# Patient Record
Sex: MALE | Race: WHITE | Employment: OTHER | URBAN - METROPOLITAN AREA
[De-identification: names, ages, dates, MRNs, and addresses within clinical notes are randomized per-mention and may not be internally consistent; named-entity substitution may affect disease eponyms.]

---

## 2017-02-17 ENCOUNTER — HOSPITAL ENCOUNTER (OUTPATIENT)
Dept: INFUSION THERAPY | Age: 72
Discharge: HOME | End: 2017-02-17
Payer: MEDICARE

## 2017-02-17 VITALS
SYSTOLIC BLOOD PRESSURE: 148 MMHG | HEART RATE: 86 BPM | BODY MASS INDEX: 35.78 KG/M2 | WEIGHT: 270 LBS | DIASTOLIC BLOOD PRESSURE: 72 MMHG | RESPIRATION RATE: 18 BRPM | TEMPERATURE: 97.1 F | HEIGHT: 73 IN | OXYGEN SATURATION: 99 %

## 2017-02-17 PROCEDURE — 96366 THER/PROPH/DIAG IV INF ADDON: CPT

## 2017-02-17 PROCEDURE — 99201 HC NEW PT LEVEL I: CPT

## 2017-02-17 PROCEDURE — 96365 THER/PROPH/DIAG IV INF INIT: CPT

## 2017-02-17 PROCEDURE — 74011250636 HC RX REV CODE- 250/636: Performed by: PODIATRIST

## 2017-02-17 RX ORDER — GUAIFENESIN 100 MG/5ML
81 LIQUID (ML) ORAL DAILY
COMMUNITY

## 2017-02-17 RX ORDER — SODIUM CHLORIDE 0.9 % (FLUSH) 0.9 %
10-40 SYRINGE (ML) INJECTION AS NEEDED
Status: DISCONTINUED | OUTPATIENT
Start: 2017-02-17 | End: 2017-02-21 | Stop reason: HOSPADM

## 2017-02-17 RX ORDER — GUAIFENESIN 100 MG/5ML
50 SOLUTION ORAL
COMMUNITY
End: 2018-04-05

## 2017-02-17 RX ADMIN — ORITAVANCIN 1200 MG: 400 INJECTION, POWDER, LYOPHILIZED, FOR SOLUTION INTRAVENOUS at 09:54

## 2017-02-17 RX ADMIN — Medication 10 ML: at 08:40

## 2017-02-17 RX ADMIN — Medication 10 ML: at 13:10

## 2017-02-17 NOTE — PROGRESS NOTES
[facility] Progress Note    Date: 2017    Name: Shahab Cardozo    MRN: [id number]         : 1945    Mr. Maddison Albrecht was assessed and education was provided. Given [employer] information of Oritavancin and reviewed side effects with him. Mr. Jaida Ruiz vitals were reviewed. Visit Vitals    /72 (BP 1 Location: Left arm)    Pulse 86    Temp 97.1 °F (36.2 °C)    Resp 18    Ht 6' 1\" (1.854 m)    Wt 122.5 kg (270 lb)    SpO2 99%    BMI 35.62 kg/m2     Patient Vitals for the past 12 hrs:   Temp Pulse Resp BP SpO2   17 1319 97.1 °F (36.2 °C) 86 18 148/72 -   17 0826 98 °F (36.7 °C) 91 18 127/63 99 %     IV started in left forearm w/22 g INT w/o difficulty. Good blood return obtained, then flushed with 10 ml NS. [x]  oritavancin (Orbactiv) 1200 mg     []  Invanz     []  Cubicin     []  Rocephin    was infused at 333 ml/hr. No s/s reaction noted. IV flushed with 10 ml NS and removed. No irritation noted at site, 2x2 guaze and Coban applied. Patient was observed for 30 minutes after completion of infusion. No complaints offered, no s/s reaction noted. Mr. Maddison Albrecht tolerated infusion, and had no complaints at this time. Patient armband removed and shredded. Mr. Maddison Albrecht was discharged from [place] [address] in stable condition at 1325. He is to return on 2017 at 1100 for his next appointment for oritavancin infusion.     aRina Suresh RN  2017  4:31 PM

## 2017-02-27 ENCOUNTER — HOSPITAL ENCOUNTER (OUTPATIENT)
Dept: INFUSION THERAPY | Age: 72
Discharge: HOME | End: 2017-02-27
Payer: MEDICARE

## 2018-03-21 ENCOUNTER — HOSPITAL ENCOUNTER (INPATIENT)
Age: 73
LOS: 15 days | Discharge: NURSING, REHAB OR LONG-TERM CARE | DRG: 629 | End: 2018-04-05
Attending: EMERGENCY MEDICINE | Admitting: HOSPITALIST
Payer: MEDICARE

## 2018-03-21 DIAGNOSIS — L08.9 DIABETIC FOOT INFECTION (HCC): Primary | ICD-10-CM

## 2018-03-21 DIAGNOSIS — E11.628 DIABETIC FOOT INFECTION (HCC): Primary | ICD-10-CM

## 2018-03-21 DIAGNOSIS — L03.115 CELLULITIS OF RIGHT LOWER EXTREMITY: ICD-10-CM

## 2018-03-21 LAB
ALBUMIN SERPL-MCNC: 3.1 G/DL (ref 3.4–5)
ALBUMIN/GLOB SERPL: 0.7 {RATIO} (ref 0.8–1.7)
ALP SERPL-CCNC: 79 U/L (ref 45–117)
ALT SERPL-CCNC: 15 U/L (ref 16–61)
ANION GAP SERPL CALC-SCNC: 8 MMOL/L (ref 3–18)
AST SERPL-CCNC: 9 U/L (ref 15–37)
BASOPHILS # BLD: 0.1 K/UL (ref 0–0.06)
BASOPHILS NFR BLD: 1 % (ref 0–2)
BILIRUB SERPL-MCNC: 0.4 MG/DL (ref 0.2–1)
BUN SERPL-MCNC: 21 MG/DL (ref 7–18)
BUN/CREAT SERPL: 18 (ref 12–20)
CALCIUM SERPL-MCNC: 9.5 MG/DL (ref 8.5–10.1)
CHLORIDE SERPL-SCNC: 102 MMOL/L (ref 100–108)
CO2 SERPL-SCNC: 28 MMOL/L (ref 21–32)
CREAT SERPL-MCNC: 1.18 MG/DL (ref 0.6–1.3)
DIFFERENTIAL METHOD BLD: ABNORMAL
EOSINOPHIL # BLD: 0.2 K/UL (ref 0–0.4)
EOSINOPHIL NFR BLD: 3 % (ref 0–5)
ERYTHROCYTE [DISTWIDTH] IN BLOOD BY AUTOMATED COUNT: 13.2 % (ref 11.6–14.5)
GLOBULIN SER CALC-MCNC: 4.6 G/DL (ref 2–4)
GLUCOSE SERPL-MCNC: 399 MG/DL (ref 74–99)
HCT VFR BLD AUTO: 39.2 % (ref 36–48)
HGB BLD-MCNC: 13.1 G/DL (ref 13–16)
LACTATE SERPL-SCNC: 1.5 MMOL/L (ref 0.4–2)
LYMPHOCYTES # BLD: 1.3 K/UL (ref 0.9–3.6)
LYMPHOCYTES NFR BLD: 16 % (ref 21–52)
MCH RBC QN AUTO: 30.4 PG (ref 24–34)
MCHC RBC AUTO-ENTMCNC: 33.4 G/DL (ref 31–37)
MCV RBC AUTO: 91 FL (ref 74–97)
MONOCYTES # BLD: 0.5 K/UL (ref 0.05–1.2)
MONOCYTES NFR BLD: 6 % (ref 3–10)
NEUTS SEG # BLD: 6.3 K/UL (ref 1.8–8)
NEUTS SEG NFR BLD: 74 % (ref 40–73)
PLATELET # BLD AUTO: 246 K/UL (ref 135–420)
PMV BLD AUTO: 10.2 FL (ref 9.2–11.8)
POTASSIUM SERPL-SCNC: 4.6 MMOL/L (ref 3.5–5.5)
PROT SERPL-MCNC: 7.7 G/DL (ref 6.4–8.2)
RBC # BLD AUTO: 4.31 M/UL (ref 4.7–5.5)
SODIUM SERPL-SCNC: 138 MMOL/L (ref 136–145)
WBC # BLD AUTO: 8.4 K/UL (ref 4.6–13.2)

## 2018-03-21 PROCEDURE — 87040 BLOOD CULTURE FOR BACTERIA: CPT | Performed by: EMERGENCY MEDICINE

## 2018-03-21 PROCEDURE — 99282 EMERGENCY DEPT VISIT SF MDM: CPT

## 2018-03-21 PROCEDURE — 74011250636 HC RX REV CODE- 250/636: Performed by: EMERGENCY MEDICINE

## 2018-03-21 PROCEDURE — 83605 ASSAY OF LACTIC ACID: CPT | Performed by: EMERGENCY MEDICINE

## 2018-03-21 PROCEDURE — 65270000029 HC RM PRIVATE

## 2018-03-21 PROCEDURE — 80053 COMPREHEN METABOLIC PANEL: CPT | Performed by: EMERGENCY MEDICINE

## 2018-03-21 PROCEDURE — 85025 COMPLETE CBC W/AUTO DIFF WBC: CPT | Performed by: EMERGENCY MEDICINE

## 2018-03-21 RX ADMIN — SODIUM CHLORIDE 1000 MG: 900 INJECTION, SOLUTION INTRAVENOUS at 23:54

## 2018-03-21 RX ADMIN — TAZOBACTAM SODIUM AND PIPERACILLIN SODIUM 4.5 G: 500; 4 INJECTION, SOLUTION INTRAVENOUS at 23:05

## 2018-03-21 NOTE — IP AVS SNAPSHOT
[address] [place] [id number] 
[phone] Patient: Quynh Jo MRN: [id number] :1945 A check radha indicates which time of day the medication should be taken. My Medications START taking these medications Instructions Each Dose to Equal  
 Morning Noon Evening Bedtime  
 atorvastatin 40 mg tablet Commonly known as:  LIPITOR Your last dose was: Your next dose is: Take 1 Tab by mouth nightly. 40 mg  
    
   
   
   
  
 furosemide 40 mg tablet Commonly known as:  LASIX Your last dose was: Your next dose is: Take 1 Tab by mouth daily. 40 mg  
    
   
   
   
  
 insulin lispro 100 unit/mL injection Commonly known as:  HUMALOG Your last dose was: Your next dose is:    
   
   
 Normal Insulin Sensitivity  For Blood Sugar (mg/dL) of:    Less than 150 =   0 units          150 -199 =   2 units 200 -249 =   4 units 250 -299 =   6 units 300 -349 =   8 units 350 and above =   10 units If 2 glucose readings are above 200 mg/dL within a 24 hr period, proceed to \"Very Insul  
     
   
   
   
  
 isosorbide mononitrate ER 60 mg CR tablet Commonly known as:  IMDUR Start taking on:  2018 Your last dose was: Your next dose is: Take 1 Tab by mouth daily. 60 mg  
    
   
   
   
  
 metoprolol tartrate 25 mg tablet Commonly known as:  LOPRESSOR Your last dose was: Your next dose is: Take 1 Tab by mouth every twelve (12) hours. 25 mg  
    
   
   
   
  
 piperacillin-tazobactam 3.375 gram 3.375 g IVPB Your last dose was: Your next dose is:    
   
   
 3.375 g by IntraVENous route every six (6) hours for 29 days. 3.375 g  
    
   
   
   
  
 vancomycin in 0.9% Sodium Cl 1.75 gram/500 mL Soln Commonly known as:  Marinelayer Your last dose was: Your next dose is:    
   
   
 500 mL by IntraVENous route every twelve (12) hours every twelve (12) hours for 29 days. 1750 mg CONTINUE taking these medications Instructions Each Dose to Equal  
 Morning Noon Evening Bedtime  
 aspirin 81 mg chewable tablet Your last dose was: Your next dose is: Take 81 mg by mouth daily. 81 mg HumaLOG Mix 75-25(U-100)Insuln 100 unit/mL (75-25) injection Generic drug:  insulin lispro protamine/insulin lispro Your last dose was: Your next dose is:    
   
   
 50 Units by SubCUTAneous route two (2) times a day. 50 Units STOP taking these medications   
 guaiFENesin 100 mg/5 mL liquid Commonly known as:  ROBITUSSIN Where to Get Your Medications Information on where to get these meds will be given to you by the nurse or doctor. ! Ask your nurse or doctor about these medications  
  atorvastatin 40 mg tablet  
 furosemide 40 mg tablet  
 insulin lispro 100 unit/mL injection  
 isosorbide mononitrate ER 60 mg CR tablet  
 metoprolol tartrate 25 mg tablet  
 piperacillin-tazobactam 3.375 gram 3.375 g IVPB  
 vancomycin in 0.9% Sodium Cl 1.75 gram/500 mL Soln

## 2018-03-21 NOTE — IP AVS SNAPSHOT
[address] [address] 
[phone] Patient: Liana Fairchild MRN: [id number] :1945 About your hospitalization You were admitted on:  2018 You last received care in the:  [address] [place] [address] You were discharged on:  2018 Why you were hospitalized Your primary diagnosis was:  Diabetic Foot Infection (Hcc) Your diagnoses also included:  Htn (Hypertension), Diabetic Neuropathy (Hcc), Diabetic Foot Ulcer (Hcc), Diabetes (Hcc), Cad (Coronary Artery Disease), Cellulitis, Abscess Of Right Foot, Osteomyelitis Of Right Foot (Hcc) Follow-up Information Follow up With Details Comments Contact Info Allen Hernandez MD   [address] [address] [phone] Irene Keane MD On 2018 Follow up appointment scheduled for 2018 at 10:00 a.m. [address] [place] [address] 
[phone] [facility]  SNF is responsible for making arrangements for the PCP visit while in-house. Chosen to continue managing your healthcare needs [address] [place], [place] [address]y [address] [facility] 
[phone] [facility]  Chosen to continue managing your healthcare needs [facility] 
([facility]ooter) [phone] Kelsea Cheung MD  Please schedule an appointment for 1 week after discharge from SNF. [facility] [address] [place] [address] Peter Cohn 27 
[phone] Allen Hernandez MD   [address] [address] [phone] Discharge Orders Procedure Order Date Status Priority Quantity Spec Type Associated Dx CBC WITH AUTOMATED DIFF 18 1329 Future Routine 1 Blood Comments:  Please send results to Dr Kelsea Cheung, outpatient Infectious Disease MD. 
Phone: [phone] Fax: [phone] Schedule Instructions:  Please collect weekly, beginning April 9, 2018 while completing course of treatment. METABOLIC PANEL, COMPREHENSIVE 04/05/18 1329 Future Routine 1 Serum Comments:  Please send results to Dr Alina Dale, outpatient Infectious Disease MD. 
Phone: [phone] Fax: [phone] Schedule Instructions:  Please collect weekly beginning on April 9, 2018 while completing treatment. SED RATE (ESR) 04/05/18 1329 Future Routine 1 Whole Blood Comments:  Please send results to Dr Alina Dale, outpatient Infectious Disease MD. 
Phone: [phone] Fax: [phone] Schedule Instructions:  Please collect weekly beginning on April 9, 2018 while completing antibiotic treatment. C REACTIVE PROTEIN, QT 04/05/18 1329 Future Routine 1 Serum Comments:  Please send results to Dr Alina Dale, outpatient Infectious Disease MD. 
Phone: [phone] Fax: [phone] Schedule Instructions:  Please collect weekly beginning on April 9, 2018 while completing antibiotic course. VANCOMYCIN, TROUGH 04/05/18 1329 Future Routine 1 Blood Comments:  Please send results to Dr Alina Dale, outpatient Infectious Disease MD. 
Phone: [phone] Fax: [phone] Schedule Instructions:  Please collect weekly beginning on April 9, 2018 while completing antibiotic course. A check radha indicates which time of day the medication should be taken. My Medications START taking these medications Instructions Each Dose to Equal  
 Morning Noon Evening Bedtime  
 atorvastatin 40 mg tablet Commonly known as:  LIPITOR Your last dose was: Your next dose is: Take 1 Tab by mouth nightly. 40 mg  
    
   
   
   
  
 furosemide 40 mg tablet Commonly known as:  LASIX Your last dose was: Your next dose is: Take 1 Tab by mouth daily.   
 40 mg  
 insulin lispro 100 unit/mL injection Commonly known as:  HUMALOG Your last dose was: Your next dose is:    
   
   
 Normal Insulin Sensitivity  For Blood Sugar (mg/dL) of:    Less than 150 =   0 units          150 -199 =   2 units 200 -249 =   4 units 250 -299 =   6 units 300 -349 =   8 units 350 and above =   10 units If 2 glucose readings are above 200 mg/dL within a 24 hr period, proceed to \"Very Insul  
     
   
   
   
  
 isosorbide mononitrate ER 60 mg CR tablet Commonly known as:  IMDUR Start taking on:  4/6/2018 Your last dose was: Your next dose is: Take 1 Tab by mouth daily. 60 mg  
    
   
   
   
  
 metoprolol tartrate 25 mg tablet Commonly known as:  LOPRESSOR Your last dose was: Your next dose is: Take 1 Tab by mouth every twelve (12) hours. 25 mg  
    
   
   
   
  
 piperacillin-tazobactam 3.375 gram 3.375 g IVPB Your last dose was: Your next dose is:    
   
   
 3.375 g by IntraVENous route every six (6) hours for 29 days. 3.375 g  
    
   
   
   
  
 vancomycin in 0.9% Sodium Cl 1.75 gram/500 mL Soln Commonly known as:  [employer] Your last dose was: Your next dose is:    
   
   
 500 mL by IntraVENous route every twelve (12) hours every twelve (12) hours for 29 days. 1750 mg CONTINUE taking these medications Instructions Each Dose to Equal  
 Morning Noon Evening Bedtime  
 aspirin 81 mg chewable tablet Your last dose was: Your next dose is: Take 81 mg by mouth daily. 81 mg HumaLOG Mix 75-25(U-100)Insuln 100 unit/mL (75-25) injection Generic drug:  insulin lispro protamine/insulin lispro Your last dose was: Your next dose is:    
   
   
 50 Units by SubCUTAneous route two (2) times a day. 50 Units STOP taking these medications   
 guaiFENesin 100 mg/5 mL liquid Commonly known as:  ROBITUSSIN Where to Get Your Medications Information on where to get these meds will be given to you by the nurse or doctor. ! Ask your nurse or doctor about these medications  
  atorvastatin 40 mg tablet  
 furosemide 40 mg tablet  
 insulin lispro 100 unit/mL injection  
 isosorbide mononitrate ER 60 mg CR tablet  
 metoprolol tartrate 25 mg tablet  
 piperacillin-tazobactam 3.375 gram 3.375 g IVPB  
 vancomycin in 0.9% Sodium Cl 1.75 gram/500 mL Soln Discharge Instructions Learning About Coronary Artery Disease (CAD) What is coronary artery disease? Coronary artery disease (CAD) occurs when plaque builds up in the arteries that bring oxygen-rich blood to your heart. Plaque is a fatty substance made of cholesterol, calcium, and other substances in the blood. This process is called hardening of the arteries, or atherosclerosis. What happens when you have coronary artery disease? · Plaque may narrow the coronary arteries. Narrowed arteries cause poor blood flow. This can lead to angina symptoms such as chest pain or discomfort. If blood flow is completely blocked, you could have a heart attack. · You can slow CAD and reduce the risk of future problems by making changes in your lifestyle. These include quitting smoking and eating heart-healthy foods. · Treatments for CAD, along with changes in your lifestyle, can help you live a longer and healthier life. How can you prevent coronary artery disease? · Do not smoke. It may be the best thing you can do to prevent heart disease. If you need help quitting, talk to your doctor about stop-smoking programs and medicines. These can increase your chances of quitting for good. · Be active. Get at least 30 minutes of exercise on most days of the week. Walking is a good choice.  You also may want to do other activities, such as running, swimming, cycling, or playing tennis or team sports. · Eat heart-healthy foods. Eat more fruits and vegetables and less foods that contain saturated and trans fats. Limit alcohol, sodium, and sweets. · Stay at a healthy weight. Lose weight if you need to. · Manage other health problems such as diabetes, high blood pressure, and high cholesterol. · Manage stress. Stress can hurt your heart. To keep stress low, talk about your problems and feelings. Don't keep your feelings hidden. · If you have talked about it with your doctor, take a low-dose aspirin every day. Aspirin can help certain people lower their risk of a heart attack or stroke. But taking aspirin isn't right for everyone, because it can cause serious bleeding. Do not start taking daily aspirin unless your doctor knows about it. How is coronary artery disease treated? · Your doctor will suggest that you make lifestyle changes. For example, your doctor may ask you to eat healthy foods, quit smoking, lose extra weight, and be more active. · You will have to take medicines. · Your doctor may suggest a procedure to open narrowed or blocked arteries. This is called angioplasty. Or your doctor may suggest using healthy blood vessels to create detours around narrowed or blocked arteries. This is called bypass surgery. Follow-up care is a key part of your treatment and safety. Be sure to make and go to all appointments, and call your doctor if you are having problems. It's also a good idea to know your test results and keep a list of the medicines you take. Where can you learn more? Go to http://zuri-jesus.info/. Enter [phone] in the search box to learn more about \"Learning About Coronary Artery Disease (CAD). \" Current as of: September 21, 2016 Content Version: 11.4 © 8204-0479 [employer].  Care instructions adapted under license by [employer] (which disclaims liability or warranty for this information). If you have questions about a medical condition or this instruction, always ask your healthcare professional. [address] any warranty or liability for your use of this information. Skin Abscess: Care Instructions Your Care Instructions A skin abscess is a bacterial infection that forms a pocket of pus. A boil is a kind of skin abscess. The doctor may have cut an opening in the abscess so that the pus can drain out. You may have gauze in the cut so that the abscess will stay open and keep draining. You may need antibiotics. You will need to follow up with your doctor to make sure the infection has gone away. The doctor has checked you carefully, but problems can develop later. If you notice any problems or new symptoms, get medical treatment right away. Follow-up care is a key part of your treatment and safety. Be sure to make and go to all appointments, and call your doctor if you are having problems. It's also a good idea to know your test results and keep a list of the medicines you take. How can you care for yourself at home? · Apply warm and dry compresses, a heating pad set on low, or a hot water bottle 3 or 4 times a day for pain. Keep a cloth between the heat source and your skin. · If your doctor prescribed antibiotics, take them as directed. Do not stop taking them just because you feel better. You need to take the full course of antibiotics. · Take pain medicines exactly as directed. ¨ If the doctor gave you a prescription medicine for pain, take it as prescribed. ¨ If you are not taking a prescription pain medicine, ask your doctor if you can take an over-the-counter medicine. · Keep your bandage clean and dry. Change the bandage whenever it gets wet or dirty, or at least one time a day. · If the abscess was packed with gauze: 
¨ Keep follow-up appointments to have the gauze changed or removed.  If the doctor instructed you to remove the gauze, gently pull out all of the gauze when your doctor tells you to. ¨ After the gauze is removed, soak the area in warm water for 15 to 20 minutes 2 times a day, until the wound closes. When should you call for help? Call your doctor now or seek immediate medical care if: 
? · You have signs of worsening infection, such as: 
¨ Increased pain, swelling, warmth, or redness. ¨ Red streaks leading from the infected skin. ¨ Pus draining from the wound. ¨ A fever. ? Watch closely for changes in your health, and be sure to contact your doctor if: 
? · You do not get better as expected. Where can you learn more? Go to http://zuri-jesus.info/. Enter F103 in the search box to learn more about \"Skin Abscess: Care Instructions. \" Current as of: October 13, 2016 Content Version: 11.4 © 8085-4582 [employer]. Care instructions adapted under license by [email or web address] (which disclaims liability or warranty for this information). If you have questions about a medical condition or this instruction, always ask your healthcare professional. [place] [address] any warranty or liability for your use of this information. Cellulitis: Care Instructions Your Care Instructions Cellulitis is a skin infection. It often occurs after a break in the skin from a scrape, cut, bite, or puncture, or after a rash. The doctor has checked you carefully, but problems can develop later. If you notice any problems or new symptoms, get medical treatment right away. Follow-up care is a key part of your treatment and safety. Be sure to make and go to all appointments, and call your doctor if you are having problems. It's also a good idea to know your test results and keep a list of the medicines you take. How can you care for yourself at home? · Take your antibiotics as directed.  Do not stop taking them just because you feel better. You need to take the full course of antibiotics. · Prop up the infected area on pillows to reduce pain and swelling. Try to keep the area above the level of your heart as often as you can. · If your doctor told you how to care for your wound, follow your doctor's instructions. If you did not get instructions, follow this general advice: ¨ Wash the wound with clean water 2 times a day. Don't use hydrogen peroxide or alcohol, which can slow healing. ¨ You may cover the wound with a thin layer of petroleum jelly, such as Vaseline, and a nonstick bandage. ¨ Apply more petroleum jelly and replace the bandage as needed. · Be safe with medicines. Take pain medicines exactly as directed. ¨ If the doctor gave you a prescription medicine for pain, take it as prescribed. ¨ If you are not taking a prescription pain medicine, ask your doctor if you can take an over-the-counter medicine. To prevent cellulitis in the future · Try to prevent cuts, scrapes, or other injuries to your skin. Cellulitis most often occurs where there is a break in the skin. · If you get a scrape, cut, mild burn, or bite, wash the wound with clean water as soon as you can to help avoid infection. Don't use hydrogen peroxide or alcohol, which can slow healing. · If you have swelling in your legs (edema), support stockings and good skin care may help prevent leg sores and cellulitis. · Take care of your feet, especially if you have diabetes or other conditions that increase the risk of infection. Wear shoes and socks. Do not go barefoot. If you have athlete's foot or other skin problems on your feet, talk to your doctor about how to treat them. When should you call for help? Call your doctor now or seek immediate medical care if: 
? · You have signs that your infection is getting worse, such as: 
¨ Increased pain, swelling, warmth, or redness. ¨ Red streaks leading from the area. ¨ Pus draining from the area. ¨ A fever. ? · You get a rash. ? Watch closely for changes in your health, and be sure to contact your doctor if: 
? · You are not getting better after 1 day (24 hours). ? · You do not get better as expected. Where can you learn more? Go to http://zuri-jesus.info/. Devyn Medellin in the search box to learn more about \"Cellulitis: Care Instructions. \" Current as of: October 13, 2016 Content Version: 11.4 © 9819-4638 [employer]. Care instructions adapted under license by [employer] (which disclaims liability or warranty for this information). If you have questions about a medical condition or this instruction, always ask your healthcare professional. [address] any warranty or liability for your use of this information. Diabetes Foot Health: Care Instructions Your Care Instructions When you have diabetes, your feet need extra care and attention. Diabetes can damage the nerve endings and blood vessels in your feet, making you less likely to notice when your feet are injured. Diabetes also limits your body's ability to fight infection and get blood to areas that need it. If you get a minor foot injury, it could become an ulcer or a serious infection. With good foot care, you can prevent most of these problems. Caring for your feet can be quick and easy. Most of the care can be done when you are bathing or getting ready for bed. Follow-up care is a key part of your treatment and safety. Be sure to make and go to all appointments, and call your doctor if you are having problems. It's also a good idea to know your test results and keep a list of the medicines you take. How can you care for yourself at home? · Keep your blood sugar close to normal by watching what and how much you eat, monitoring blood sugar, taking medicines if prescribed, and getting regular exercise. · Do not smoke.  Smoking affects blood flow and can make foot problems worse. If you need help quitting, talk to your doctor about stop-smoking programs and medicines. These can increase your chances of quitting for good. · Eat a diet that is low in fats. High fat intake can cause fat to build up in your blood vessels and decrease blood flow. · Inspect your feet daily for blisters, cuts, cracks, or sores. If you cannot see well, use a mirror or have someone help you. · Take care of your feet: 
[employer] your feet every day. Use warm (not hot) water. Check the water temperature with your wrists or other part of your body, not your feet. ¨ Dry your feet well. Pat them dry. Do not rub the skin on your feet too hard. Dry well between your toes. If the skin on your feet stays moist, bacteria or a fungus can grow, which can lead to infection. ¨ Keep your skin soft. Use moisturizing skin cream to keep the skin on your feet soft and prevent calluses and cracks. But do not put the cream between your toes, and stop using any cream that causes a rash. ¨ Clean underneath your toenails carefully. Do not use a sharp object to clean underneath your toenails. Use the blunt end of a nail file or other rounded tool. ¨ Trim and file your toenails straight across to prevent ingrown toenails. Use a nail clipper, not scissors. Use an emery board to smooth the edges. · Change socks daily. Socks without seams are best, because seams often rub the feet. You can find socks for people with diabetes from specialty catalogs. · Look inside your shoes every day for things like gravel or torn linings, which could cause blisters or sores. · Buy shoes that fit well: 
¨ Look for shoes that have plenty of space around the toes. This helps prevent bunions and blisters. ¨ Try on shoes while wearing the kind of socks you will usually wear with the shoes. ¨ Avoid plastic shoes. They may rub your feet and cause blisters.  Good shoes should be made of materials that are flexible and breathable, such as leather or cloth. ¨ Break in new shoes slowly by wearing them for no more than an hour a day for several days. Take extra time to check your feet for red areas, blisters, or other problems after you wear new shoes. · Do not go barefoot. Do not wear sandals, and do not wear shoes with very thin soles. Thin soles are easy to puncture. They also do not protect your feet from hot pavement or cold weather. · Have your doctor check your feet during each visit. If you have a foot problem, see your doctor. Do not try to treat an early foot problem at home. Home remedies or treatments that you can buy without a prescription (such as corn removers) can be harmful. · Always get early treatment for foot problems. A minor irritation can lead to a major problem if not properly cared for early. When should you call for help? Call your doctor now or seek immediate medical care if: 
? · You have a foot sore, an ulcer or break in the skin that is not healing after 4 days, bleeding corns or calluses, or an ingrown toenail. ? · You have blue or black areas, which can mean bruising or blood flow problems. ? · You have peeling skin or tiny blisters between your toes or cracking or oozing of the skin. ? · You have a fever for more than 24 hours and a foot sore. ? · You have new numbness or tingling in your feet that does not go away after you move your feet or change positions. ? · You have unexplained or unusual swelling of the foot or ankle. ? Watch closely for changes in your health, and be sure to contact your doctor if: 
? · You cannot do proper foot care. Where can you learn more? Go to http://zuri-jesus.info/. Enter A739 in the search box to learn more about \"Diabetes Foot Health: Care Instructions. \" Current as of: March 13, 2017 Content Version: 11.4 © 6203-8521 [employer]wise[employer].  Care instructions adapted under license by N[employer]el [employer] (which disclaims liability or warranty for this information). If you have questions about a medical condition or this instruction, always ask your healthcare professional. [address]  any warranty or liability for your use of this information. HEART CATHETERIZATION/ANGIOGRAPHY DISCHARGE INSTRUCTIONS 1. Check puncture site frequently for swelling or bleeding. If there is any bleeding, lie down and apply pressure over the area with a clean towel or washcloth. Notify your doctor for any redness, swelling, drainage, or oozing from the puncture site. Notify your doctor for any fever or chills. 2. If the extremity becomes cold, numb, or painful go to the emergency room 3. Activity should be limited for the next 48 hours. Climb stairs as little as possible and avoid any stooping, bending, or strenuous activity for 48 hours. No heavy lifting (anything over 10 pounds) for 3 days. 4. You may resume your usual diet. Drink more fluids than usual. 
5. Have a responsible person drive you home and stay with you for at least 24 hours after your heart catheterization/angiography. 6. You may remove bandage from your Left and Arm in 24 hours. You may shower in 24 hours. No tub baths, hot tubs, or swimming for 1 week. Do not place any lotions, creams, powders, or ointments over puncture site for 1 week. You may place a clean band-aid over the puncture site each day for 5 days. Change daily. I have read the above instructions and have had the opportunity to ask questions. Patient: ________________________   Date: 4/3/2018 Witness: _______________________   Date: 4/3/2018 Lab Results Component Value Date/Time Hemoglobin A1c 11.3 (H) 03/22/2018 11:02 AM  
 
 An A1C of 5.7-6.4% meets the criteria for pre-diabetes; an A1C of 6.5% or higher meets the criteria for diabetes.   
 
This lab test reflects that your blood sugar averaged 278 mg/dL over the past 3 months. It is important to follow up with your provider on a routine basis to continue to evaluate your blood sugar and discuss any necessary changes in treatment. [employer] Announcement We are excited to announce that we are making your provider's discharge notes available to you in [employer]. You will see these notes when they are completed and signed by the physician that discharged you from your recent hospital stay. If you have any questions or concerns about any information you see in [employer], please call the Health Information Department where you were seen or reach out to your Primary Care Provider for more information about your plan of care. Introducing [facility]! [facility] introduces [employer] patient portal. Now you can access parts of your medical record, email your doctor's office, and request medication refills online. 1. In your internet browser, go to [email or web address] 2. Click on the First Time User? Click Here link in the Sign In box. You will see the New Member Sign Up page. 3. Enter your [employer] Access Code exactly as it appears below. You will not need to use this code after youve completed the sign-up process. If you do not sign up before the expiration date, you must request a new code. · [employer] Access Code: [email or web address] Expires: 7/4/2018  1:42 PM 
 
4. Enter the last four digits of your Social Security Number (xxxx) and Date of Birth (mm/dd/yyyy) as indicated and click Submit. You will be taken to the next sign-up page. 5. Create a [employer] ID. This will be your [employer] login ID and cannot be changed, so think of one that is secure and easy to remember. 6. Create a [employer] password. You can change your password at any time. 7. Enter your Password Reset Question and Answer. This can be used at a later time if you forget your password. 8. Enter your e-mail address.  You will receive e-mail notification when new information is available in [employer]. 9. Click Sign Up. You can now view and download portions of your medical record. 10. Click the Download Summary menu link to download a portable copy of your medical information. If you have questions, please visit the Frequently Asked Questions section of the [employer] website. Remember, [employer] is NOT to be used for urgent needs. For medical emergencies, dial 911. Now available from your iPhone and Android! Introducing Guanaco Buchanan As a [facility] patient, I wanted to make you aware of our electronic visit tool called Guanaco Buchanan. [employer] 24/7 allows you to connect within minutes with a medical provider 24 hours a day, seven days a week via a mobile device or tablet or logging into a secure website from your computer. You can access Guanaco Buchanan from anywhere in the [place]. A virtual visit might be right for you when you have a simple condition and feel like you just dont want to get out of bed, or cant get away from work for an appointment, when your regular [facility] provider is not available (evenings, weekends or holidays), or when youre out of town and need minor care. Electronic visits cost only $49 and if the [employer] 24/[employer] provider determines a prescription is needed to treat your condition, one can be electronically transmitted to a nearby pharmacy*. Please take a moment to enroll today if you have not already done so. The enrollment process is free and takes just a few minutes. To enroll, please download the [employer] 24/7 kimberly to your tablet or phone, or visit [email or web address] to enroll on your computer. And, as an [address] [place] [address] patient with a [email or web address] account, the results of your visits will be scanned into your electronic medical record and your primary care provider will be able to view the scanned results. We urge you to continue to see your regular [employer] provider for your ongoing medical care. And while your primary care provider may not be the one available when you seek a [employer] virtual visit, the peace of mind you get from getting a real diagnosis real time can be priceless. For more information on [employer], view our Frequently Asked Questions (FAQs) at [email or web address]. Sincerely, 
 
Sheri Vásquez MD 
Chief Medical Officer [employer] *:  certain medications cannot be prescribed via [employer] Unresulted Labs-Please follow up with your PCP about these lab tests Order Current Status NUCLEAR STRESS TEST Preliminary result Providers Seen During Your Hospitalization Provider Specialty Primary office phone Evelyn Guzman MD Emergency Medicine [phone] Gini Gotti MD [facility] [phone] Your Primary Care Physician (PCP) Primary Care Physician Office Phone Office Fax Maricarmen Lockhart, [address] [phone] You are allergic to the following Allergen Reactions Doxycycline Anaphylaxis Bactrim (Sulfamethoprim Ds) Diarrhea Keflex (Cephalexin) Diarrhea Recent Documentation Height Weight BMI Smoking Status 1.854 m 125.3 kg 36.44 kg/m2 Never Smoker Emergency Contacts Name Discharge Info Relation Home Work Mobile Jacqueline Alonso DISCHARGE CAREGIVER [3] Spouse [3] [phone] Patient Belongings The following personal items are in your possession at time of discharge: 
  Dental Appliances: None  Visual Aid: Glasses, With patient      Home Medications: None   Jewelry: None  Clothing: Undergarments    Other Valuables: Cane  Personal Items Sent to Safe: none Please provide this summary of care documentation to your next provider. Signatures-by signing, you are acknowledging that this After Visit Summary has been reviewed with you and you have received a copy. Patient Signature:  ____________________________________________________________ Date:  ____________________________________________________________  
  
Lola Deniz Provider Signature:  ____________________________________________________________ Date:  ____________________________________________________________

## 2018-03-21 NOTE — ED TRIAGE NOTES
C/o wound to outer aspect of right foot for approx 1 week, states wound is draining, states area is warm to touch. Sepsis Screening completed    (  )Patient meets SIRS criteria. (x  )Patient does not meet SIRS criteria.       SIRS Criteria is achieved when two or more of the following are present   Temperature < 96.8°F (36°C) or > 100.9°F (38.3°C)   Heart Rate > 90 beats per minute   Respiratory Rate > 20 breaths per minute   WBC count > 12,000 or <4,000 or > 10% bands

## 2018-03-22 ENCOUNTER — APPOINTMENT (OUTPATIENT)
Dept: MRI IMAGING | Age: 73
DRG: 629 | End: 2018-03-22
Attending: PHYSICIAN ASSISTANT
Payer: MEDICARE

## 2018-03-22 ENCOUNTER — APPOINTMENT (OUTPATIENT)
Dept: GENERAL RADIOLOGY | Age: 73
DRG: 629 | End: 2018-03-22
Attending: HOSPITALIST
Payer: MEDICARE

## 2018-03-22 PROBLEM — L03.90 CELLULITIS: Status: ACTIVE | Noted: 2018-03-22

## 2018-03-22 LAB
EST. AVERAGE GLUCOSE BLD GHB EST-MCNC: 278 MG/DL
GLUCOSE BLD STRIP.AUTO-MCNC: 227 MG/DL (ref 70–110)
GLUCOSE BLD STRIP.AUTO-MCNC: 266 MG/DL (ref 70–110)
GLUCOSE BLD STRIP.AUTO-MCNC: 285 MG/DL (ref 70–110)
GLUCOSE BLD STRIP.AUTO-MCNC: 286 MG/DL (ref 70–110)
GLUCOSE BLD STRIP.AUTO-MCNC: 377 MG/DL (ref 70–110)
GLUCOSE BLD STRIP.AUTO-MCNC: 399 MG/DL (ref 70–110)
HBA1C MFR BLD: 11.3 % (ref 4.5–5.6)

## 2018-03-22 PROCEDURE — 74011250637 HC RX REV CODE- 250/637: Performed by: PHYSICIAN ASSISTANT

## 2018-03-22 PROCEDURE — 74011250637 HC RX REV CODE- 250/637: Performed by: HOSPITALIST

## 2018-03-22 PROCEDURE — 73630 X-RAY EXAM OF FOOT: CPT

## 2018-03-22 PROCEDURE — 82962 GLUCOSE BLOOD TEST: CPT

## 2018-03-22 PROCEDURE — 74011636637 HC RX REV CODE- 636/637: Performed by: PHYSICIAN ASSISTANT

## 2018-03-22 PROCEDURE — 87077 CULTURE AEROBIC IDENTIFY: CPT | Performed by: HOSPITALIST

## 2018-03-22 PROCEDURE — 87070 CULTURE OTHR SPECIMN AEROBIC: CPT | Performed by: HOSPITALIST

## 2018-03-22 PROCEDURE — 65270000029 HC RM PRIVATE

## 2018-03-22 PROCEDURE — 36415 COLL VENOUS BLD VENIPUNCTURE: CPT | Performed by: PHYSICIAN ASSISTANT

## 2018-03-22 PROCEDURE — 83036 HEMOGLOBIN GLYCOSYLATED A1C: CPT | Performed by: PHYSICIAN ASSISTANT

## 2018-03-22 PROCEDURE — A9577 INJ MULTIHANCE: HCPCS | Performed by: HOSPITALIST

## 2018-03-22 PROCEDURE — 74011250636 HC RX REV CODE- 250/636: Performed by: HOSPITALIST

## 2018-03-22 PROCEDURE — 73720 MRI LWR EXTREMITY W/O&W/DYE: CPT

## 2018-03-22 PROCEDURE — 74011636637 HC RX REV CODE- 636/637: Performed by: HOSPITALIST

## 2018-03-22 RX ORDER — MAGNESIUM SULFATE 100 %
16 CRYSTALS MISCELLANEOUS AS NEEDED
Status: DISCONTINUED | OUTPATIENT
Start: 2018-03-22 | End: 2018-04-05 | Stop reason: HOSPADM

## 2018-03-22 RX ORDER — VANCOMYCIN/0.9 % SOD CHLORIDE 1.5G/250ML
1500 PLASTIC BAG, INJECTION (ML) INTRAVENOUS EVERY 12 HOURS
Status: DISCONTINUED | OUTPATIENT
Start: 2018-03-22 | End: 2018-03-24 | Stop reason: DRUGHIGH

## 2018-03-22 RX ORDER — INSULIN LISPRO 100 [IU]/ML
10 INJECTION, SOLUTION INTRAVENOUS; SUBCUTANEOUS
Status: DISCONTINUED | OUTPATIENT
Start: 2018-03-22 | End: 2018-03-26

## 2018-03-22 RX ORDER — INSULIN LISPRO 100 [IU]/ML
INJECTION, SOLUTION INTRAVENOUS; SUBCUTANEOUS
Status: DISCONTINUED | OUTPATIENT
Start: 2018-03-22 | End: 2018-03-24

## 2018-03-22 RX ORDER — DEXTROSE 50 % IN WATER (D50W) INTRAVENOUS SYRINGE
50 AS NEEDED
Status: DISCONTINUED | OUTPATIENT
Start: 2018-03-22 | End: 2018-04-05 | Stop reason: HOSPADM

## 2018-03-22 RX ORDER — ENOXAPARIN SODIUM 100 MG/ML
40 INJECTION SUBCUTANEOUS EVERY 24 HOURS
Status: DISCONTINUED | OUTPATIENT
Start: 2018-03-22 | End: 2018-03-30

## 2018-03-22 RX ORDER — GUAIFENESIN 100 MG/5ML
81 LIQUID (ML) ORAL DAILY
Status: DISCONTINUED | OUTPATIENT
Start: 2018-03-22 | End: 2018-04-05 | Stop reason: HOSPADM

## 2018-03-22 RX ORDER — SODIUM CHLORIDE 0.9 % (FLUSH) 0.9 %
5-10 SYRINGE (ML) INJECTION AS NEEDED
Status: DISCONTINUED | OUTPATIENT
Start: 2018-03-22 | End: 2018-04-05 | Stop reason: HOSPADM

## 2018-03-22 RX ORDER — SODIUM CHLORIDE 0.9 % (FLUSH) 0.9 %
5-10 SYRINGE (ML) INJECTION EVERY 8 HOURS
Status: DISCONTINUED | OUTPATIENT
Start: 2018-03-22 | End: 2018-03-24

## 2018-03-22 RX ORDER — ACETAMINOPHEN 325 MG/1
650 TABLET ORAL
Status: DISCONTINUED | OUTPATIENT
Start: 2018-03-22 | End: 2018-04-05 | Stop reason: HOSPADM

## 2018-03-22 RX ORDER — POLYETHYLENE GLYCOL 3350 17 G/17G
17 POWDER, FOR SOLUTION ORAL DAILY
Status: DISCONTINUED | OUTPATIENT
Start: 2018-03-22 | End: 2018-04-05 | Stop reason: HOSPADM

## 2018-03-22 RX ORDER — INSULIN GLARGINE 100 [IU]/ML
30 INJECTION, SOLUTION SUBCUTANEOUS DAILY
Status: DISCONTINUED | OUTPATIENT
Start: 2018-03-22 | End: 2018-04-05 | Stop reason: HOSPADM

## 2018-03-22 RX ADMIN — INSULIN LISPRO 10 UNITS: 100 INJECTION, SOLUTION INTRAVENOUS; SUBCUTANEOUS at 17:26

## 2018-03-22 RX ADMIN — Medication 10 ML: at 08:55

## 2018-03-22 RX ADMIN — INSULIN GLARGINE 30 UNITS: 100 INJECTION, SOLUTION SUBCUTANEOUS at 10:39

## 2018-03-22 RX ADMIN — VANCOMYCIN HYDROCHLORIDE 1500 MG: 10 INJECTION, POWDER, LYOPHILIZED, FOR SOLUTION INTRAVENOUS at 21:23

## 2018-03-22 RX ADMIN — ENOXAPARIN SODIUM 40 MG: 40 INJECTION SUBCUTANEOUS at 02:48

## 2018-03-22 RX ADMIN — INSULIN LISPRO 10 UNITS: 100 INJECTION, SOLUTION INTRAVENOUS; SUBCUTANEOUS at 12:38

## 2018-03-22 RX ADMIN — Medication 10 ML: at 21:25

## 2018-03-22 RX ADMIN — Medication 10 ML: at 17:27

## 2018-03-22 RX ADMIN — VANCOMYCIN HYDROCHLORIDE 1500 MG: 10 INJECTION, POWDER, LYOPHILIZED, FOR SOLUTION INTRAVENOUS at 08:50

## 2018-03-22 RX ADMIN — INSULIN LISPRO 10 UNITS: 100 INJECTION, SOLUTION INTRAVENOUS; SUBCUTANEOUS at 10:47

## 2018-03-22 RX ADMIN — ASPIRIN 81 MG 81 MG: 81 TABLET ORAL at 08:45

## 2018-03-22 RX ADMIN — POLYETHYLENE GLYCOL 3350 17 G: 17 POWDER, FOR SOLUTION ORAL at 12:38

## 2018-03-22 RX ADMIN — INSULIN LISPRO 6 UNITS: 100 INJECTION, SOLUTION INTRAVENOUS; SUBCUTANEOUS at 08:47

## 2018-03-22 RX ADMIN — INSULIN LISPRO 4 UNITS: 100 INJECTION, SOLUTION INTRAVENOUS; SUBCUTANEOUS at 17:26

## 2018-03-22 RX ADMIN — INSULIN LISPRO 6 UNITS: 100 INJECTION, SOLUTION INTRAVENOUS; SUBCUTANEOUS at 22:10

## 2018-03-22 RX ADMIN — GADOBENATE DIMEGLUMINE 15 ML: 529 INJECTION, SOLUTION INTRAVENOUS at 15:57

## 2018-03-22 NOTE — H&P
History & Physical    Patient: Angel Holguin MRN: [id number]  CSN: [id number]    YOB: 1945  Age: 71 y.o. Sex: male      DOA: 3/21/2018  Primary Care Provider:  Lavon Fernandes MD      Assessment/Plan     Patient Active Problem List   Diagnosis Code    Diabetic foot infection ([facility] E11.69, L08.9    HTN (hypertension) I10    Diabetic neuropathy ([facility] E11.40    Diabetic foot ulcer ([facility] E11.621, L97.509    Diabetes ([facility] E11.9    CAD (coronary artery disease) I25.10    Cellulitis L03.90       Admit to medical floor    Cellulitis/diabetic foot infection - failed outpatient antibiotics. Start on vancomycin, follow blood and wound cultures. Keep right leg elevated. Podiatry consulted by ER, patient is followed by Dr. Ion Kat. DM - continue home insulin and start on SSI. HTN - monitor BP    CAD - continue with aspirin    DVT prophylaxis        Estimated length of stay : 2-3 days    CC: diabetic foot ulcer, cellulitis       HPI:     Angel Holguin is a 71 y.o. male who has past history of CAD, DM, HTN, diabetic neuropathy, diabetic foot ulcer is sent to ER by his podiatrist concerning cellulitis and diabetes foot ulcer. Patient reports that it started with small wound in the plantar aspect of his right mid foot. It gradually started getting worse. He was seen by his podiatrist and was started on amoxacillin, however his wound continued to get worse. He has now developed redness and drainage for the site. He denies any fever/chills. He has diabetic foot ulcer on his right heel, right and left first metatarsal for over 2 years. He called his podiatrist Dr. Ion Kat and he advised to go to ER.   In ER his wbc in normal range and vitals stable      Past Medical History:   Diagnosis Date    Asthma     CAD (coronary artery disease)     Diabetes ([facility]     Diabetic foot ulcer ([facility]     Diabetic neuropathy ([facility]     HTN (hypertension)        Past Surgical History:   Procedure Laterality Date    CARDIAC SURG PROCEDURE UNLIST  1998    bypass    HX ORTHOPAEDIC         No family history on file. Social History     Social History    Marital status:      Spouse name: N/A    Number of children: N/A    Years of education: N/A     Social History Main Topics    Smoking status: Never Smoker    Smokeless tobacco: Not on file    Alcohol use No    Drug use: No    Sexual activity: Not on file     Other Topics Concern    Not on file     Social History Narrative       Prior to Admission medications    Medication Sig Start Date End Date Taking? Authorizing Provider   aspirin 81 mg chewable tablet Take 81 mg by mouth daily. Historical Provider   insulin lispro protamine/insulin lispro (HUMALOG MIX 75-25) 100 unit/mL (75-25) injection 100 Units by SubCUTAneous route every morning. Historical Provider   guaiFENesin (ROBITUSSIN) 100 mg/5 mL liquid Take 50 mg by mouth three (3) times daily as needed for Cough. Historical Provider       Allergies   Allergen Reactions    Doxycycline Anaphylaxis    Bactrim [Sulfamethoprim Ds] Diarrhea    Keflex [Cephalexin] Diarrhea       Review of Systems  Gen: No fever, chills, malaise, weight loss/gain. Heent: No headache, rhinorrhea, epistaxis, ear pain, hearing loss, sinus pain, neck pain/stiffness, sore throat. Heart: No chest pain, palpitations, EVANS, pnd, or orthopnea. Resp: No cough, hemoptysis, wheezing and shortness of breath. GI: No nausea, vomiting, diarrhea, constipation, melena or hematochezia. : No urinary obstruction, dysuria or hematuria. Derm: see above   Musc/skeletal: no bone or joint complains. Vasc: No edema, cyanosis or claudication. Endo: No heat/cold intolerance, no polyuria,polydipsia or polyphagia. Neuro: No unilateral weakness, numbness, tingling. No seizures. Heme: No easy bruising or bleeding.           Physical Exam:     Physical Exam:  Visit Vitals    /54 (BP Patient Position: At rest)    Pulse 89    Temp 98 °F (36.7 °C)    Resp 20    Ht 6' 1\" (1.854 m)    Wt 124.5 kg (274 lb 6.4 oz)    SpO2 95%    BMI 36.2 kg/m2      O2 Device: Room air    Temp (24hrs), Av.2 °F (36.8 °C), Min:97.3 °F (36.3 °C), Max:99.3 °F (37.4 °C)             General:  Awake, cooperative, no distress. Head:  Normocephalic, without obvious abnormality, atraumatic. Eyes:  Conjunctivae/corneas clear, sclera anicteric, PERRL, EOMs intact. Nose: Nares normal. No drainage or sinus tenderness. Throat: Lips, mucosa, and tongue normal.    Neck: Supple, symmetrical, trachea midline, no adenopathy. Lungs:   Clear to auscultation bilaterally. Heart:  Regular rate and rhythm, S1, S2 normal, no murmur, click, rub or gallop. Abdomen: Soft, non-tender. Bowel sounds normal. No masses,  No organomegaly. Extremities: Cellulitis, diabetic foot ulcer right heel and bilateral MTP joint. Open wound right mid foot plantar aspect, with drainage. Pulses: 2+ and symmetric all extremities. Skin: Skin color pink, turgor normal. No rashes or lesions   Neurologic: CNII-XII intact. No focal motor or sensory deficit.        Labs Reviewed:    CMP:   Lab Results   Component Value Date/Time     2018 08:20 PM    K 4.6 2018 08:20 PM     2018 08:20 PM    CO2 28 2018 08:20 PM    AGAP 8 2018 08:20 PM     (H) 2018 08:20 PM    BUN 21 (H) 2018 08:20 PM    CREA 1.18 2018 08:20 PM    GFRAA >60 2018 08:20 PM    GFRNA >60 2018 08:20 PM    CA 9.5 2018 08:20 PM    ALB 3.1 (L) 2018 08:20 PM    TP 7.7 2018 08:20 PM    GLOB 4.6 (H) 2018 08:20 PM    AGRAT 0.7 (L) 2018 08:20 PM    SGOT 9 (L) 2018 08:20 PM    ALT 15 (L) 2018 08:20 PM     CBC:   Lab Results   Component Value Date/Time    WBC 8.4 2018 08:20 PM    HGB 13.1 2018 08:20 PM    HCT 39.2 2018 08:20 PM     2018 08:20 PM     All Cardiac Markers in the last 24 hours:  No results found for: CPK, CK, CKMMB, CKMB, RCK3, CKMBT, CKNDX, CKND1, NELLA, TROPT, TROIQ, LIDIA, TROPT, TNIPOC, BNP, BNPP      Procedures/imaging: see electronic medical records for all procedures/Xrays and details which were not copied into this note but were reviewed prior to creation of Plan        CC: Rosanne Asif MD

## 2018-03-22 NOTE — PROGRESS NOTES
Hospitalist Progress Note    Patient: Roselia Brewster MRN: [id number]  CSN: [id number]    YOB: 1945  Age: 71 y.o. Sex: male    DOA: 3/21/2018 LOS:  LOS: 0 days          Chief Complaint:    DM Foot Ulcer    Assessment/Plan     1. DM Foot Ulcer  2. Cellulitis   3. IDDM  4. HTN  5. CAD    1. Continue vancomycin, follow blood cultures and wound cultures. Xray of the foot shows destructive changes at first MTP joint, keeping with septic arthritis of the great toe MTP joint and osteomyelitis of the great toe proximal phalanx and distal first metatarsal head. Blood cultures remain negative at this time. Will get MRI of the right foot. Discussed case with Dr Judy Vo, who will see the patient after office hours today. 2. As above. 3. This patient's A1c is 11.9. Adjustments made to insulin regimen. Discontinue 75/25, continue with Lantus 30 units daily, Humalog 10 units TIDAC, SSI, diabetic diet, and hypoglycemia protocol. Will monitor. 4. BP Stable, continue to monitor. 5. Continue Aspirin. DVT Prophylaxis - Lovenox  Dispo: Given findings on xray, will obtain MRI, continue IV abx. Will wait podiatry input. Patient Active Problem List   Diagnosis Code    Diabetic foot infection ([facility] E11.69, L08.9    HTN (hypertension) I10    Diabetic neuropathy (HCC) E11.40    Diabetic foot ulcer ([facility] E11.621, L97.509    Diabetes ([facility] E11.9    CAD (coronary artery disease) I25.10    Cellulitis L03.90       Subjective:    No concerns or complaints voiced this AM.   Did not sleep well.      Review of systems:    Constitutional: denies fevers, chills, myalgias  Respiratory: denies SOB, cough  Cardiovascular: denies chest pain, palpitations  Gastrointestinal: denies nausea, vomiting, diarrhea      Vital signs/Intake and Output:  Visit Vitals    /59 (BP 1 Location: Left arm, BP Patient Position: Sitting)    Pulse 87    Temp 98.1 °F (36.7 °C)    Resp 16    Ht 6' 1\" (1.854 m)    Wt 124.5 kg (274 lb 6.4 oz)    SpO2 99%    BMI 36.2 kg/m2     Current Shift:  03/22 0701 - 03/22 1900  In: 720 [P.O.:720]  Out: 900 [Urine:900]  Last three shifts:  03/20 1901 - 03/22 0700  In: -   Out: 300 [Urine:300]    Exam:    General: Elderly, chronically ill appearing male, alert, NAD, OX3  Head/Neck: NCAT, supple, No masses, No lymphadenopathy  CVS:Regular rate and rhythm, no M/R/G, S1/S2 heard, no thrill  Lungs:Clear to auscultation bilaterally, no wheezes, rhonchi, or rales  Abdomen: Soft, Nontender, No distention, Normal Bowel sounds, No hepatomegaly  Extremities: LE cellulitis, bilateral feet wrapped with drainage. Skin:normal texture and turgor, no rashes, no lesions  Neuro:grossly normal , follows commands  Psych:appropriate                Labs: Results:       Chemistry Recent Labs      03/21/18 2020   GLU  399*   NA  138   K  4.6   CL  102   CO2  28   BUN  21*   CREA  1.18   CA  9.5   AGAP  8   BUCR  18   AP  79   TP  7.7   ALB  3.1*   GLOB  4.6*   AGRAT  0.7*      CBC w/Diff Recent Labs      03/21/18 2020   WBC  8.4   RBC  4.31*   HGB  13.1   HCT  39.2   PLT  246   GRANS  74*   LYMPH  16*   EOS  3      Cardiac Enzymes No results for input(s): CPK, CKND1, NELLA in the last 72 hours. No lab exists for component: CKRMB, TROIP   Coagulation No results for input(s): PTP, INR, APTT in the last 72 hours. No lab exists for component: INREXT    Lipid Panel No results found for: CHOL, CHOLPOCT, CHOLX, CHLST, CHOLV, 122892, HDL, LDL, LDLC, DLDLP, 516367, VLDLC, VLDL, TGLX, TRIGL, TRIGP, TGLPOCT, CHHD, CHHDX   BNP No results for input(s): BNPP in the last 72 hours.    Liver Enzymes Recent Labs      03/21/18 2020   TP  7.7   ALB  3.1*   AP  79   SGOT  9*      Thyroid Studies No results found for: T4, T3U, TSH, TSHEXT     Procedures/imaging: see electronic medical records for all procedures/Xrays and details which were not copied into this note but were reviewed prior to creation of [address], PAHellenC

## 2018-03-22 NOTE — PROGRESS NOTES
Pt admitted due to diabetic foot infection. Pt is  and uses a cane to ambulate. Met with pt at bedside to discuss plan of care options. Discussed HH for wound care and therapy. Pt has indicated he has been doing his wound care himself for the last few years and has been ambulating with a cane. Pt does not see a need for HH at this time. Pt is pending a podiatry and wound care consult. Please order therapy when appropriate to assist with identifying an appropriate plan of care. CM to continue to follow. Discharge Reassessment Plan:  High Risk and MSSP/Good Help ACO patients    RRAT Score:  21 or greater    High Risk Care Transition Interventions:  1. Discharge transition plan:  [facility]   2. Involved patient/caregiver in assessment, planning, education and implement of intervention. 3. CM daily patient care huddles/interdisciplinary rounds were completed. 4. PCP/Specialist appointment to be scheduled within 48 hours unless otherwise specified. 5. Facilitated transportation and logistics for follow-up appointments. 6. Facilitated Medication reconciliation  Pharmacy. Date/Time  7. Formal handoff between hospital provider and post-acute provider to transition patient completed. 8. Handoff to [address] [place] [address] Nurse Navigator or PCP practice completed. Discharge follow-up phone call within 2  4 days (NN, [facility], Nursing) CM follow up as assigned. Care Management Interventions  PCP Verified by CM: Yes  Mode of Transport at Discharge:  Other (see comment) (family)  Transition of Care Consult (CM Consult): Discharge Planning  Health Maintenance Reviewed: Yes  Current Support Network: Lives with Spouse, Family Lives Nearby  Confirm Follow Up Transport: Self  Plan discussed with Pt/Family/Caregiver: Yes  Discharge Location  Discharge Placement: Home with family assistance (vs HH)

## 2018-03-22 NOTE — CONSULTS
[facility]  Federico Pabon, [facility], [address] [place] [address]  [phone]    Plan  1. Patient is scheduled for surgery tomorrow at 1:30 PM  2. Sx: right foot incision drainage, bone biopsy and resection of infected bone right foot  3. NPO at midnight tonight  4. D/C any and all anticoagulants at midnight tonight    Assessment/Plan           Patient Active Problem List   Diagnosis Code    Diabetic foot infection ([address] E11.69, L08.9    HTN (hypertension) I10    Diabetic neuropathy (HCC) E11.40    Diabetic foot ulcer ([address] E11.621, L97.509    Diabetes ([address] E11.9    CAD (coronary artery disease) I25.10    Cellulitis L03.90         HPI:  Patient is a long time patient of mine. I saw him a week ago and for right foot infection and placed him on amoxicillin and he call my office a few days ago saying his foot was getting worse and I told him to go to the emergency room for admission. He has a history of severe GI upset with most p.o antibiotics except amoxicillin. Past Medical History:   Diagnosis Date    Asthma      CAD (coronary artery disease)      Diabetes ([address]      Diabetic foot ulcer ([address]      Diabetic neuropathy ([address]      HTN (hypertension)                 Past Surgical History:   Procedure Laterality Date    CARDIAC SURG PROCEDURE UNLIST   1998     bypass    HX ORTHOPAEDIC             No family history on file.      Social History                Social History    Marital status:        Spouse name: N/A    Number of children: N/A    Years of education: N/A           Social History Main Topics    Smoking status: Never Smoker    Smokeless tobacco: Not on file    Alcohol use No    Drug use: No    Sexual activity: Not on file           Other Topics Concern    Not on file      Social History Narrative                    Prior to Admission medications    Medication Sig Start Date End Date Taking?  Authorizing Provider   aspirin 81 mg chewable tablet Take 81 mg by mouth daily.       Historical Provider   insulin lispro protamine/insulin lispro (HUMALOG MIX 75-25) 100 unit/mL (75-25) injection 100 Units by SubCUTAneous route every morning.       Historical Provider   guaiFENesin (ROBITUSSIN) 100 mg/5 mL liquid Take 50 mg by mouth three (3) times daily as needed for Cough.       Historical Provider              Allergies   Allergen Reactions    Doxycycline Anaphylaxis    Bactrim [Sulfamethoprim Ds] Diarrhea    Keflex [Cephalexin] Diarrhea         Review of Systems  Gen: No fever, chills, malaise, weight loss/gain. Heent: No headache, rhinorrhea, epistaxis, ear pain, hearing loss, sinus pain, neck pain/stiffness, sore throat. Heart: No chest pain, palpitations, EVANS, pnd, or orthopnea. Resp: No cough, hemoptysis, wheezing and shortness of breath. GI: No nausea, vomiting, diarrhea, constipation, melena or hematochezia. : No urinary obstruction, dysuria or hematuria. Derm: see above   Musc/skeletal: no bone or joint complains. Vasc: No edema, cyanosis or claudication. Endo: No heat/cold intolerance, no polyuria,polydipsia or polyphagia. Neuro: No unilateral weakness, numbness, tingling. No seizures.    Heme: No easy bruising or bleeding.     PE:  Date/Time Temp Pulse BP MAP (Calculated) Arterial Line 1 BP (mmHg) BP Patient Position Resp SpO2 O2 Device O2 Flow Rate (L/min) Pre/Post Ductal Weight      03/22/18 1650 98.3 °F (36.8 °C) 78 119/46 70 -- At rest 16 96 % -- -- -- --     03/22/18 1141 98.1 °F (36.7 °C) 87 133/59 84 -- Sitting 16 99 % -- -- -- --     03/22/18 0748 98.1 °F (36.7 °C) 84 127/53 78 -- At rest 16 99 % -- -- -- --     03/22/18 0356 98.2 °F (36.8 °C) 85 123/49 74 -- At rest 18 98 % -- -- -- --     03/22/18 0130 -- -- -- -- -- -- -- -- Room air -- -- --     03/22/18 0045 98 °F (36.7 °C) 89 146/54 85 -- At rest 20 95 % Room air -- -- 124.5 kg (274 lb 6.4 oz)     03/21/18 23:17:01 99.3 °F (37.4 °C) 88 143/59 87 -- At rest 18 99 % Room air -- -- --     03/21/18 1815 97.3 °F (36.3 °C) 91 138/73 95             Constitutional: wnl    General: patient is alert, awake and in no apparent distress    Head:normocephalic, without trauma    Eyes:conjunctivae & cornea clear, PERRL    Lower extremity:   Palpable pedal pulses bilateral feet, CFT < 3 seconds all digits  Submet 1 ulceration, no bone or tendon exposed nor drainage, + weeping in the right foot arch, + marked erythema/cellulitis mid arch and right foot heel fissure. Mild foul odor    Skin: Skin color pink, turgor normal. No rashes or lesions   Neurologic: CNII-XII intact. No focal motor or sensory deficit.    MRI right foot 3-    IMPRESSION:  1. Medial right forefoot soft tissue ulcer with associated osteomyelitis of the  distal first metatarsal, sesamoids, and great toe proximal phalanx. No evidence  of necrotic or nonenhancing bone. Small amount of fluid at the residual joint  space is noted to demonstrate mild enhancement, in keeping with associated  septic arthritis of the first MTP joint. 2. Skin thickening and enhancement involving the great toe, greatest medially in  keeping with associated cellulitis. 3. No organized or drainable fluid collection.   4. Diffusely abnormal intrinsic muscle bulk and signal in keeping with sequela

## 2018-03-22 NOTE — ROUTINE PROCESS
TRANSFER - IN REPORT:    Verbal report received from MEI Thomson RN (name) on Ally Moe  being received from  Emergency Dept. (unit) for routine progression of care      Report consisted of patients Situation, Background, Assessment and   Recommendations(SBAR). Information from the following report(s) SBAR, Kardex, ED Summary, Recent Results and Med Rec Status was reviewed with the receiving nurse. Opportunity for questions and clarification was provided. Assessment completed upon patients arrival to unit and care assumed.

## 2018-03-22 NOTE — PROGRESS NOTES
Shift Summary : Rested well after initial arrival to unit with admission data base completed. Each foot has hardened area on bottom of foot under great toe. Back of right heel has es charred area. Each foot instep has reddened area with warmth. No complaints of pain.

## 2018-03-22 NOTE — PROGRESS NOTES
Pharmacy Dosing Services: Vancomycin    Consult for Vancomycin Dosing by Pharmacy by Dr. Malissa Horton provided for this 71y.o. year old male , for indication of skin and soft tissue infection. Day of Therapy 1    Ht Readings from Last 1 Encounters:   03/21/18 185.4 cm (73\")        Wt Readings from Last 1 Encounters:   03/22/18 124.5 kg (274 lb 6.4 oz)        Significant Cultures Pending drom micro lab   Serum Creatinine Lab Results   Component Value Date/Time    Creatinine 1.18 03/21/2018 08:20 PM      Creatinine Clearance Estimated Creatinine Clearance: 79.3 mL/min (based on Cr of 1.18). BUN Lab Results   Component Value Date/Time    BUN 21 (H) 03/21/2018 08:20 PM      WBC Lab Results   Component Value Date/Time    WBC 8.4 03/21/2018 08:20 PM      H/H Lab Results   Component Value Date/Time    HGB 13.1 03/21/2018 08:20 PM      Platelets Lab Results   Component Value Date/Time    PLATELET 078 91/22/1102 08:20 PM      Temp 98 °F (36.7 °C)     Start Vancomycin therapy, with   dose of 1000(mg) at 2354/ 03/21/2018(time/date). Follow with maintenance dose of 1500(mg) at 0800/03/23/2018 (time/date), every 12 hours (frequency). Dose calculated to approximate a therapeutic trough of 10-20mcg/mL. Pharmacy to follow daily and will make changes to dose and/or frequency based on clinical status. Estimated Pharmacokinetic Parameters (based on population kinetics)  Vd: 87 L (0.7 L/kg)   Babatunde: 0.062 hr-1 (T1/2 = 11.2 hrs)     Dosing Recommendations   Vancomycin dose: 1500 mg IV Q12hrs (infused over 1.5 hrs)   Estimated peak: 31.3 mcg/mL   Estimated trough: 16.3 mcg/mL   Estimated AUC:LANA: 555 mcg*hr/mL (assumed LANA 1 mcg/mL)     A/P:   1. Recommend vancomycin 1500 mg IV Q12hrs (12 mg/kg)   2. Consider a vancomycin trough level prior to the 4th dose. 3. Please monitor renal function (urine output, BUN/SCr). Dose adjustments may be necessary with a significant change in renal function.      Pharmacist Melissa Lindsay Kacie Vázquez

## 2018-03-22 NOTE — DIABETES MGMT
GLYCEMIC CONTROL SCREENING INITIATED:    -known h/o T2DM, Humalog Mix 75/25 insulin BID home regimen  -HbA1C pending  -mixed insulin orders d/c r/t to the risk of hypoglycemic      Lab Results   Component Value Date/Time    Glucose 399 (H) 03/21/2018 08:20 PM         [x]  Glucose values exceeding inpatient target range: -180mg/dl            non-ICU 70-140mg/dl      []  Patient meets criteria for pre-diabetes   HbA1c = 5.7-6.4%      [x]  Patient meets criteria for diabetes HbA1c ? 6.5%      []  Recommend discontinuing oral anti-diabetic medications until discharge. [x]  Recommend initiate basal insulin per IP Insulin order set, Lantus 30 units daily (orders entered per protocol). [x]  Recommend initiate meal time insulin per IP Insulin order set, Humalog 10 units qac (orders entered per protocol). [x]  Recommend continue corrective insulin per IP Insulin order set. [x]  Standard insulin scale  i[]  Very insuln resistant scale       []  Patient meets criteria for IV Insulin Infusion/GlucoStabilizer order set. []  Patient has had a hypoglycemic event, recommend      [x]  Recommend continue blood glucose monitoring. []  Patient will need prescription for glucometer, test strips and lancets. [x]  Recommend carbohydrate controlled diabetic diet. [x]  Diabetes Self-Management class schedule provided and patient encouraged to attend.     [] Other      Stephie Christian RN, MS  Glycemic Control Team  Pager [phone])  *After Hours pager [phone]

## 2018-03-22 NOTE — ED PROVIDER NOTES
EMERGENCY DEPARTMENT HISTORY AND PHYSICAL EXAM    Date: 3/21/2018  Patient Name: Liana Fairchild    History of Presenting Illness     Chief Complaint   Patient presents with    Foot Pain         History Provided By: Patient    Chief Complaint: wound  Duration: 1 Weeks  Timing:  Gradual  Location: right foot  Quality: Aching  Severity: Moderate  Modifying Factors: Hx of DM  Associated Symptoms: drinagae and nausea    Additional History (Context):   8:10 PM  Liana Fairchild is a 71 y.o. male with PMHX of insulin dependent DM who presents to the emergency department C/O painful wound to right foot onset one week ago. Pt was started on amoxicillin 1 week ago for wound but sxs have not improved. Associated sxs include chills, drainage from foot and nausea. Pt denies vomiting, fever and any other sxs or complaints. PCP: Allen Hernandez MD   Podiatrist: Dr Yariel Cardozo. Deniz    Current Facility-Administered Medications   Medication Dose Route Frequency Provider Last Rate Last Dose    vancomycin (VANCOCIN) 1,000 mg in 0.9% sodium chloride (MBP/ADV) 250 mL adv  1,000 mg IntraVENous ONCE Jass Milan MD        piperacillin-tazobactam (ZOSYN) 4.5 g IVPB (premix)  4.5 g IntraVENous NOW Jass Milan MD         Current Outpatient Prescriptions   Medication Sig Dispense Refill    aspirin 81 mg chewable tablet Take 81 mg by mouth daily.  insulin lispro protamine/insulin lispro (HUMALOG MIX 75-25) 100 unit/mL (75-25) injection 100 Units by SubCUTAneous route every morning.  guaiFENesin (ROBITUSSIN) 100 mg/5 mL liquid Take 50 mg by mouth three (3) times daily as needed for Cough.          Past History     Past Medical History:  Past Medical History:   Diagnosis Date    Asthma     CAD (coronary artery disease)     Diabetes ([facility]     Diabetic neuropathy ([facility]        Past Surgical History:  Past Surgical History:   Procedure Laterality Date    CARDIAC SURG PROCEDURE UNLIST  1998    bypass    HX ORTHOPAEDIC Family History:  No family history on file. Social History:  Social History   Substance Use Topics    Smoking status: Never Smoker    Smokeless tobacco: Not on file    Alcohol use No       Allergies: Allergies   Allergen Reactions    Doxycycline Anaphylaxis    Bactrim [Sulfamethoprim Ds] Diarrhea    Keflex [Cephalexin] Diarrhea         Review of Systems   Review of Systems   Constitutional: Positive for chills. Negative for fever. Gastrointestinal: Positive for diarrhea. Negative for vomiting. Skin: Positive for wound. All other systems reviewed and are negative. Physical Exam     Vitals:    03/21/18 1815   BP: 138/73   Pulse: 91   Resp: 16   Temp: 97.3 °F (36.3 °C)   SpO2: 100%   Weight: 117.9 kg (260 lb)   Height: 6' 1\" (1.854 m)     Physical Exam   Nursing note and vitals reviewed. Constitutional: Alert. Well appearing, no acute distress  Head: Normocephalic, Atraumatic  Eyes: Pupils are equal, round, and reactive to light, EOMI  ENT: Moist mucous membranes, oropharynx clear. Neck: Supple, non-tender  Cardiovascular: Regular rate and rhythm, no murmurs, rubs, or gallops  Chest: Normal work of breathing and chest excursion bilaterally. No reproducible chest tenderness. Lungs: Clear to ausculation bilaterally. Abdomen: Soft, non tender, non distended, normoactive bowel sounds  Back: No evidence of trauma or deformity. No CVA Tenderness. Extremities: No evidence of trauma or deformity, no LE edema  Skin: 2cm ulcer at base of right great toe and 3 cm ulcer at base of left great toe. Large blister filled with purulent fluid at bottom of right foot with surrounding erythema which spreads to arch of foot and dorsum of foot.  No lymphatic streaking  Neuro: Alert and appropriate, facial movement symmetric, normal speech, strength and sensation full and symmetric bilaterally, normal coordination  Psychiatric: Normal mood and affect     Diagnostic Study Results     Labs -     Recent Results (from the past 12 hour(s))   CBC WITH AUTOMATED DIFF    Collection Time: 03/21/18  8:20 PM   Result Value Ref Range    WBC 8.4 4.6 - 13.2 K/uL    RBC 4.31 (L) 4.70 - 5.50 M/uL    HGB 13.1 13.0 - 16.0 g/dL    HCT 39.2 36.0 - 48.0 %    MCV 91.0 74.0 - 97.0 FL    MCH 30.4 24.0 - 34.0 PG    MCHC 33.4 31.0 - 37.0 g/dL    RDW 13.2 11.6 - 14.5 %    PLATELET 611 076 - 994 K/uL    MPV 10.2 9.2 - 11.8 FL    NEUTROPHILS 74 (H) 40 - 73 %    LYMPHOCYTES 16 (L) 21 - 52 %    MONOCYTES 6 3 - 10 %    EOSINOPHILS 3 0 - 5 %    BASOPHILS 1 0 - 2 %    ABS. NEUTROPHILS 6.3 1.8 - 8.0 K/UL    ABS. LYMPHOCYTES 1.3 0.9 - 3.6 K/UL    ABS. MONOCYTES 0.5 0.05 - 1.2 K/UL    ABS. EOSINOPHILS 0.2 0.0 - 0.4 K/UL    ABS. BASOPHILS 0.1 (H) 0.0 - 0.06 K/UL    DF AUTOMATED     METABOLIC PANEL, COMPREHENSIVE    Collection Time: 03/21/18  8:20 PM   Result Value Ref Range    Sodium 138 136 - 145 mmol/L    Potassium 4.6 3.5 - 5.5 mmol/L    Chloride 102 100 - 108 mmol/L    CO2 28 21 - 32 mmol/L    Anion gap 8 3.0 - 18 mmol/L    Glucose 399 (H) 74 - 99 mg/dL    BUN 21 (H) 7.0 - 18 MG/DL    Creatinine 1.18 0.6 - 1.3 MG/DL    BUN/Creatinine ratio 18 12 - 20      GFR est AA >60 >60 ml/min/1.73m2    GFR est non-AA >60 >60 ml/min/1.73m2    Calcium 9.5 8.5 - 10.1 MG/DL    Bilirubin, total 0.4 0.2 - 1.0 MG/DL    ALT (SGPT) 15 (L) 16 - 61 U/L    AST (SGOT) 9 (L) 15 - 37 U/L    Alk.  phosphatase 79 45 - 117 U/L    Protein, total 7.7 6.4 - 8.2 g/dL    Albumin 3.1 (L) 3.4 - 5.0 g/dL    Globulin 4.6 (H) 2.0 - 4.0 g/dL    A-G Ratio 0.7 (L) 0.8 - 1.7     LACTIC ACID    Collection Time: 03/21/18  8:20 PM   Result Value Ref Range    Lactic acid 1.5 0.4 - 2.0 MMOL/L       Radiologic Studies -   No orders to display     CT Results  (Last 48 hours)    None        CXR Results  (Last 48 hours)    None          Medications given in the ED-  Medications   vancomycin (VANCOCIN) 1,000 mg in 0.9% sodium chloride (MBP/ADV) 250 mL adv (not administered)   piperacillin-tazobactam (ZOSYN) 4.5 g IVPB (premix) (not administered)         Medical Decision Making   I am the first provider for this patient. I reviewed the vital signs, available nursing notes, past medical history, past surgical history, family history and social history. Vital Signs-Reviewed the patient's vital signs. Pulse Oximetry Analysis - 100% on RA     Procedures:  Procedures    ED Course:   8:10 PM Initial assessment performed. The patients presenting problems have been discussed, and they are in agreement with the care plan formulated and outlined with them. I have encouraged them to ask questions as they arise throughout their visit. 9:50 PM Discussed patient's history, exam, and available diagnostics results with Bakari Gayle MD, hospitalist, who agree with admitting pt. 10:01 PM Discussed patient's history, exam, and available diagnostics results with , podiatrist on call for , who agree with plan. They will consult on pt.     11:02 PM Discussed patient's history, exam, and available diagnostics results with , podiatrist, who is aware of pt and will consult. Diagnosis and Disposition       Core Measures:  For Hospitalized Patients:    1. Hospitalization Decision Time:  The decision to hospitalize the patient was made by Gabriella Haas MD at 8:30 PM on 3/21/2018    2. Aspirin: Aspirin was not given because the patient did not present with a stroke at the time of their Emergency Department evaluation    9:51 PM  Patient is being admitted to the hospital by Bakari Gayle MD. The results of their tests and reasons for their admission have been discussed with them and/or available family. They convey agreement and understanding for the need to be admitted and for their admission diagnosis. CONDITIONS ON ADMISSION:  Sepsis is not present at the time of admission. Deep Vein Thrombosis is not present at the time of admission. Thrombosis is not present at the time of admission. Urinary Tract Infection is not present at the time of admission. Pneumonia is not present at the time of admission. MRSA is not present at the time of admission. Skin infection is present at the time of admission. Pressure Ulcer is not present at the time of admission. CLINICAL IMPRESSION:    No diagnosis found. Discussion: 71 y.o male presents for diabetic foot infection that is failing outpatient Abx. Vitals are stable and he does not have evidence of sepsis. However due to severe intolerance of oral Abx regimens and failure of outpatient Abx will admit for IV vanc and zosyn. Podiatry will consult on the patient.  _______________________________    Attestations: This note is prepared by Kristie Wright , acting as Scribe for Jose Shen MD.    Jose Shen MD:  The scribe's documentation has been prepared under my direction and personally reviewed by me in its entirety.   I confirm that the note above accurately reflects all work, treatment, procedures, and medical decision making performed by me.  _______________________________

## 2018-03-22 NOTE — ROUTINE PROCESS
Bedside and Verbal shift change report given to DANETTE Harris RN  (oncoming nurse) by  NAN Mendoza RN  (offgoing nurse). Report included the following information SBAR, Kardex, Recent Results and Med Rec Status.

## 2018-03-22 NOTE — ROUTINE PROCESS
TRANSFER - OUT REPORT:    Verbal report given to Perri Sosa RN(name) on Tiff Levy  being transferred to medical (unit) for   Routine progression of care    Report consisted of patients Situation, Background, Assessment and   Recommendations(SBAR). Information from the following report(s) ED Summary was reviewed with the receiving nurse    Lines:   Peripheral IV 03/21/18 Right Antecubital (Active)   Site Assessment Clean, dry, & intact 3/21/2018  8:22 PM   Phlebitis Assessment 0 3/21/2018  8:22 PM   Infiltration Assessment 0 3/21/2018  8:22 PM   Dressing Status Clean, dry, & intact 3/21/2018  8:22 PM   Dressing Type 4 X 4;Transparent 3/21/2018  8:22 PM   Hub Color/Line Status Pink 3/21/2018  8:22 PM   Alcohol Cap Used Yes 3/21/2018  8:22 PM        Opportunity for questions and clarification was provided.       Patient transported with:   [employer]

## 2018-03-23 ENCOUNTER — APPOINTMENT (OUTPATIENT)
Dept: GENERAL RADIOLOGY | Age: 73
DRG: 629 | End: 2018-03-23
Attending: PODIATRIST
Payer: MEDICARE

## 2018-03-23 ENCOUNTER — ANESTHESIA (OUTPATIENT)
Dept: SURGERY | Age: 73
DRG: 629 | End: 2018-03-23
Payer: MEDICARE

## 2018-03-23 ENCOUNTER — ANESTHESIA EVENT (OUTPATIENT)
Dept: SURGERY | Age: 73
DRG: 629 | End: 2018-03-23
Payer: MEDICARE

## 2018-03-23 PROBLEM — L02.611 ABSCESS OF RIGHT FOOT: Status: RESOLVED | Noted: 2018-03-23 | Resolved: 2018-03-23

## 2018-03-23 PROBLEM — M86.9 OSTEOMYELITIS OF RIGHT FOOT (HCC): Status: ACTIVE | Noted: 2018-03-23

## 2018-03-23 PROBLEM — L02.611 ABSCESS OF RIGHT FOOT: Status: ACTIVE | Noted: 2018-03-23

## 2018-03-23 LAB
ANION GAP SERPL CALC-SCNC: 9 MMOL/L (ref 3–18)
BUN SERPL-MCNC: 16 MG/DL (ref 7–18)
BUN/CREAT SERPL: 21 (ref 12–20)
CALCIUM SERPL-MCNC: 8.6 MG/DL (ref 8.5–10.1)
CHLORIDE SERPL-SCNC: 108 MMOL/L (ref 100–108)
CO2 SERPL-SCNC: 25 MMOL/L (ref 21–32)
CREAT SERPL-MCNC: 0.78 MG/DL (ref 0.6–1.3)
ERYTHROCYTE [DISTWIDTH] IN BLOOD BY AUTOMATED COUNT: 13.1 % (ref 11.6–14.5)
GLUCOSE BLD STRIP.AUTO-MCNC: 173 MG/DL (ref 70–110)
GLUCOSE BLD STRIP.AUTO-MCNC: 182 MG/DL (ref 70–110)
GLUCOSE BLD STRIP.AUTO-MCNC: 188 MG/DL (ref 70–110)
GLUCOSE BLD STRIP.AUTO-MCNC: 205 MG/DL (ref 70–110)
GLUCOSE BLD STRIP.AUTO-MCNC: 241 MG/DL (ref 70–110)
GLUCOSE SERPL-MCNC: 196 MG/DL (ref 74–99)
HCT VFR BLD AUTO: 33.3 % (ref 36–48)
HGB BLD-MCNC: 11.1 G/DL (ref 13–16)
MCH RBC QN AUTO: 30 PG (ref 24–34)
MCHC RBC AUTO-ENTMCNC: 33.3 G/DL (ref 31–37)
MCV RBC AUTO: 90 FL (ref 74–97)
PLATELET # BLD AUTO: 212 K/UL (ref 135–420)
PMV BLD AUTO: 9.8 FL (ref 9.2–11.8)
POTASSIUM SERPL-SCNC: 3.9 MMOL/L (ref 3.5–5.5)
RBC # BLD AUTO: 3.7 M/UL (ref 4.7–5.5)
SODIUM SERPL-SCNC: 142 MMOL/L (ref 136–145)
WBC # BLD AUTO: 5.2 K/UL (ref 4.6–13.2)

## 2018-03-23 PROCEDURE — 74011636637 HC RX REV CODE- 636/637: Performed by: PHYSICIAN ASSISTANT

## 2018-03-23 PROCEDURE — 74011000250 HC RX REV CODE- 250

## 2018-03-23 PROCEDURE — 77030012508 HC MSK AIRWY LMA AMBU -A: Performed by: SPECIALIST

## 2018-03-23 PROCEDURE — 77030031139 HC SUT VCRL2 J&J -A: Performed by: PODIATRIST

## 2018-03-23 PROCEDURE — 77030002916 HC SUT ETHLN J&J -A: Performed by: PODIATRIST

## 2018-03-23 PROCEDURE — 77030018836 HC SOL IRR NACL ICUM -A: Performed by: PODIATRIST

## 2018-03-23 PROCEDURE — 65270000029 HC RM PRIVATE

## 2018-03-23 PROCEDURE — 74011250636 HC RX REV CODE- 250/636: Performed by: PODIATRIST

## 2018-03-23 PROCEDURE — 87077 CULTURE AEROBIC IDENTIFY: CPT | Performed by: HOSPITALIST

## 2018-03-23 PROCEDURE — 74011000250 HC RX REV CODE- 250: Performed by: PODIATRIST

## 2018-03-23 PROCEDURE — 74011636637 HC RX REV CODE- 636/637: Performed by: HOSPITALIST

## 2018-03-23 PROCEDURE — 85027 COMPLETE CBC AUTOMATED: CPT | Performed by: HOSPITALIST

## 2018-03-23 PROCEDURE — 80048 BASIC METABOLIC PNL TOTAL CA: CPT | Performed by: HOSPITALIST

## 2018-03-23 PROCEDURE — 87077 CULTURE AEROBIC IDENTIFY: CPT | Performed by: PODIATRIST

## 2018-03-23 PROCEDURE — 76210000016 HC OR PH I REC 1 TO 1.5 HR: Performed by: PODIATRIST

## 2018-03-23 PROCEDURE — 87075 CULTR BACTERIA EXCEPT BLOOD: CPT | Performed by: PODIATRIST

## 2018-03-23 PROCEDURE — 74011250636 HC RX REV CODE- 250/636: Performed by: SPECIALIST

## 2018-03-23 PROCEDURE — 77030019895 HC PCKNG STRP IODO -A: Performed by: PODIATRIST

## 2018-03-23 PROCEDURE — 77030020753 HC CUF TRNQT 1BLA STRY -B: Performed by: PODIATRIST

## 2018-03-23 PROCEDURE — 82962 GLUCOSE BLOOD TEST: CPT

## 2018-03-23 PROCEDURE — 77030006773 HC BLD SAW OSC BRSM -A: Performed by: PODIATRIST

## 2018-03-23 PROCEDURE — 76010000153 HC OR TIME 1.5 TO 2 HR: Performed by: PODIATRIST

## 2018-03-23 PROCEDURE — 87070 CULTURE OTHR SPECIMN AEROBIC: CPT | Performed by: HOSPITALIST

## 2018-03-23 PROCEDURE — 0QBQ0ZZ EXCISION OF RIGHT TOE PHALANX, OPEN APPROACH: ICD-10-PCS | Performed by: PODIATRIST

## 2018-03-23 PROCEDURE — 73620 X-RAY EXAM OF FOOT: CPT

## 2018-03-23 PROCEDURE — 0QBN0ZX EXCISION OF RIGHT METATARSAL, OPEN APPROACH, DIAGNOSTIC: ICD-10-PCS | Performed by: PODIATRIST

## 2018-03-23 PROCEDURE — 74011250636 HC RX REV CODE- 250/636

## 2018-03-23 PROCEDURE — 87070 CULTURE OTHR SPECIMN AEROBIC: CPT | Performed by: PODIATRIST

## 2018-03-23 PROCEDURE — 76060000034 HC ANESTHESIA 1.5 TO 2 HR: Performed by: PODIATRIST

## 2018-03-23 PROCEDURE — 0JDQ0ZZ EXTRACTION OF RIGHT FOOT SUBCUTANEOUS TISSUE AND FASCIA, OPEN APPROACH: ICD-10-PCS | Performed by: PODIATRIST

## 2018-03-23 PROCEDURE — 74011250636 HC RX REV CODE- 250/636: Performed by: HOSPITALIST

## 2018-03-23 PROCEDURE — 87186 SC STD MICRODIL/AGAR DIL: CPT | Performed by: PODIATRIST

## 2018-03-23 PROCEDURE — 87076 CULTURE ANAEROBE IDENT EACH: CPT | Performed by: PODIATRIST

## 2018-03-23 PROCEDURE — 36415 COLL VENOUS BLD VENIPUNCTURE: CPT | Performed by: HOSPITALIST

## 2018-03-23 PROCEDURE — 87186 SC STD MICRODIL/AGAR DIL: CPT | Performed by: HOSPITALIST

## 2018-03-23 PROCEDURE — 74011636637 HC RX REV CODE- 636/637: Performed by: SPECIALIST

## 2018-03-23 PROCEDURE — 87184 SC STD DISK METHOD PER PLATE: CPT | Performed by: PODIATRIST

## 2018-03-23 PROCEDURE — 0QBN0ZZ EXCISION OF RIGHT METATARSAL, OPEN APPROACH: ICD-10-PCS | Performed by: PODIATRIST

## 2018-03-23 RX ORDER — FENTANYL CITRATE 50 UG/ML
INJECTION, SOLUTION INTRAMUSCULAR; INTRAVENOUS AS NEEDED
Status: DISCONTINUED | OUTPATIENT
Start: 2018-03-23 | End: 2018-03-23 | Stop reason: HOSPADM

## 2018-03-23 RX ORDER — MAGNESIUM SULFATE 100 %
4 CRYSTALS MISCELLANEOUS AS NEEDED
Status: DISCONTINUED | OUTPATIENT
Start: 2018-03-23 | End: 2018-03-23 | Stop reason: HOSPADM

## 2018-03-23 RX ORDER — MIDAZOLAM HYDROCHLORIDE 1 MG/ML
INJECTION, SOLUTION INTRAMUSCULAR; INTRAVENOUS AS NEEDED
Status: DISCONTINUED | OUTPATIENT
Start: 2018-03-23 | End: 2018-03-23 | Stop reason: HOSPADM

## 2018-03-23 RX ORDER — SODIUM CHLORIDE 0.9 % (FLUSH) 0.9 %
5-10 SYRINGE (ML) INJECTION AS NEEDED
Status: DISCONTINUED | OUTPATIENT
Start: 2018-03-23 | End: 2018-03-23 | Stop reason: HOSPADM

## 2018-03-23 RX ORDER — EPHEDRINE SULFATE/0.9% NACL/PF 25 MG/5 ML
SYRINGE (ML) INTRAVENOUS AS NEEDED
Status: DISCONTINUED | OUTPATIENT
Start: 2018-03-23 | End: 2018-03-23 | Stop reason: HOSPADM

## 2018-03-23 RX ORDER — HYDROMORPHONE HYDROCHLORIDE 2 MG/ML
1 INJECTION, SOLUTION INTRAMUSCULAR; INTRAVENOUS; SUBCUTANEOUS
Status: DISCONTINUED | OUTPATIENT
Start: 2018-03-23 | End: 2018-03-30 | Stop reason: SDUPTHER

## 2018-03-23 RX ORDER — SODIUM CHLORIDE 0.9 % (FLUSH) 0.9 %
5-10 SYRINGE (ML) INJECTION EVERY 8 HOURS
Status: DISCONTINUED | OUTPATIENT
Start: 2018-03-23 | End: 2018-03-24

## 2018-03-23 RX ORDER — ONDANSETRON 2 MG/ML
INJECTION INTRAMUSCULAR; INTRAVENOUS AS NEEDED
Status: DISCONTINUED | OUTPATIENT
Start: 2018-03-23 | End: 2018-03-23 | Stop reason: HOSPADM

## 2018-03-23 RX ORDER — BUPIVACAINE HYDROCHLORIDE 2.5 MG/ML
INJECTION, SOLUTION EPIDURAL; INFILTRATION; INTRACAUDAL AS NEEDED
Status: DISCONTINUED | OUTPATIENT
Start: 2018-03-23 | End: 2018-03-23 | Stop reason: HOSPADM

## 2018-03-23 RX ORDER — DEXTROSE 50 % IN WATER (D50W) INTRAVENOUS SYRINGE
25-50 AS NEEDED
Status: DISCONTINUED | OUTPATIENT
Start: 2018-03-23 | End: 2018-03-23 | Stop reason: HOSPADM

## 2018-03-23 RX ORDER — SODIUM CHLORIDE, SODIUM LACTATE, POTASSIUM CHLORIDE, CALCIUM CHLORIDE 600; 310; 30; 20 MG/100ML; MG/100ML; MG/100ML; MG/100ML
100 INJECTION, SOLUTION INTRAVENOUS CONTINUOUS
Status: DISCONTINUED | OUTPATIENT
Start: 2018-03-23 | End: 2018-03-23 | Stop reason: HOSPADM

## 2018-03-23 RX ORDER — FENTANYL CITRATE 50 UG/ML
25 INJECTION, SOLUTION INTRAMUSCULAR; INTRAVENOUS
Status: DISCONTINUED | OUTPATIENT
Start: 2018-03-23 | End: 2018-03-23 | Stop reason: HOSPADM

## 2018-03-23 RX ORDER — SODIUM CHLORIDE, SODIUM LACTATE, POTASSIUM CHLORIDE, CALCIUM CHLORIDE 600; 310; 30; 20 MG/100ML; MG/100ML; MG/100ML; MG/100ML
125 INJECTION, SOLUTION INTRAVENOUS CONTINUOUS
Status: DISCONTINUED | OUTPATIENT
Start: 2018-03-23 | End: 2018-03-27 | Stop reason: ALTCHOICE

## 2018-03-23 RX ORDER — INSULIN LISPRO 100 [IU]/ML
INJECTION, SOLUTION INTRAVENOUS; SUBCUTANEOUS ONCE
Status: COMPLETED | OUTPATIENT
Start: 2018-03-23 | End: 2018-03-23

## 2018-03-23 RX ORDER — INSULIN LISPRO 100 [IU]/ML
INJECTION, SOLUTION INTRAVENOUS; SUBCUTANEOUS ONCE
Status: DISCONTINUED | OUTPATIENT
Start: 2018-03-23 | End: 2018-03-24

## 2018-03-23 RX ORDER — LIDOCAINE HYDROCHLORIDE 20 MG/ML
INJECTION, SOLUTION EPIDURAL; INFILTRATION; INTRACAUDAL; PERINEURAL AS NEEDED
Status: DISCONTINUED | OUTPATIENT
Start: 2018-03-23 | End: 2018-03-23 | Stop reason: HOSPADM

## 2018-03-23 RX ORDER — NALOXONE HYDROCHLORIDE 0.4 MG/ML
0.2 INJECTION, SOLUTION INTRAMUSCULAR; INTRAVENOUS; SUBCUTANEOUS AS NEEDED
Status: DISCONTINUED | OUTPATIENT
Start: 2018-03-23 | End: 2018-03-23 | Stop reason: HOSPADM

## 2018-03-23 RX ORDER — NALOXONE HYDROCHLORIDE 0.4 MG/ML
0.4 INJECTION, SOLUTION INTRAMUSCULAR; INTRAVENOUS; SUBCUTANEOUS AS NEEDED
Status: DISCONTINUED | OUTPATIENT
Start: 2018-03-23 | End: 2018-04-05 | Stop reason: HOSPADM

## 2018-03-23 RX ORDER — SODIUM CHLORIDE 0.9 % (FLUSH) 0.9 %
5-10 SYRINGE (ML) INJECTION AS NEEDED
Status: DISCONTINUED | OUTPATIENT
Start: 2018-03-23 | End: 2018-03-23

## 2018-03-23 RX ORDER — PROPOFOL 10 MG/ML
INJECTION, EMULSION INTRAVENOUS AS NEEDED
Status: DISCONTINUED | OUTPATIENT
Start: 2018-03-23 | End: 2018-03-23 | Stop reason: HOSPADM

## 2018-03-23 RX ADMIN — VANCOMYCIN HYDROCHLORIDE 1500 MG: 10 INJECTION, POWDER, LYOPHILIZED, FOR SOLUTION INTRAVENOUS at 08:15

## 2018-03-23 RX ADMIN — SODIUM CHLORIDE, SODIUM LACTATE, POTASSIUM CHLORIDE, AND CALCIUM CHLORIDE 125 ML/HR: 600; 310; 30; 20 INJECTION, SOLUTION INTRAVENOUS at 19:11

## 2018-03-23 RX ADMIN — SODIUM CHLORIDE, SODIUM LACTATE, POTASSIUM CHLORIDE, AND CALCIUM CHLORIDE: 600; 310; 30; 20 INJECTION, SOLUTION INTRAVENOUS at 16:41

## 2018-03-23 RX ADMIN — LIDOCAINE HYDROCHLORIDE 100 MG: 20 INJECTION, SOLUTION EPIDURAL; INFILTRATION; INTRACAUDAL; PERINEURAL at 15:05

## 2018-03-23 RX ADMIN — ONDANSETRON 4 MG: 2 INJECTION INTRAMUSCULAR; INTRAVENOUS at 14:59

## 2018-03-23 RX ADMIN — Medication 10 MG: at 15:15

## 2018-03-23 RX ADMIN — SODIUM CHLORIDE, SODIUM LACTATE, POTASSIUM CHLORIDE, AND CALCIUM CHLORIDE 125 ML/HR: 600; 310; 30; 20 INJECTION, SOLUTION INTRAVENOUS at 21:43

## 2018-03-23 RX ADMIN — PROPOFOL 200 MG: 10 INJECTION, EMULSION INTRAVENOUS at 15:05

## 2018-03-23 RX ADMIN — FENTANYL CITRATE 100 MCG: 50 INJECTION, SOLUTION INTRAMUSCULAR; INTRAVENOUS at 15:07

## 2018-03-23 RX ADMIN — HYDROMORPHONE HYDROCHLORIDE 1 MG: 2 INJECTION, SOLUTION INTRAMUSCULAR; INTRAVENOUS; SUBCUTANEOUS at 20:29

## 2018-03-23 RX ADMIN — INSULIN LISPRO 1 UNITS: 100 INJECTION, SOLUTION INTRAVENOUS; SUBCUTANEOUS at 17:58

## 2018-03-23 RX ADMIN — SODIUM CHLORIDE, SODIUM LACTATE, POTASSIUM CHLORIDE, AND CALCIUM CHLORIDE 125 ML/HR: 600; 310; 30; 20 INJECTION, SOLUTION INTRAVENOUS at 13:59

## 2018-03-23 RX ADMIN — INSULIN LISPRO 3 UNITS: 100 INJECTION, SOLUTION INTRAVENOUS; SUBCUTANEOUS at 14:13

## 2018-03-23 RX ADMIN — VANCOMYCIN HYDROCHLORIDE 1500 MG: 10 INJECTION, POWDER, LYOPHILIZED, FOR SOLUTION INTRAVENOUS at 21:43

## 2018-03-23 RX ADMIN — MIDAZOLAM HYDROCHLORIDE 2 MG: 1 INJECTION, SOLUTION INTRAMUSCULAR; INTRAVENOUS at 14:59

## 2018-03-23 RX ADMIN — INSULIN GLARGINE 30 UNITS: 100 INJECTION, SOLUTION SUBCUTANEOUS at 08:23

## 2018-03-23 RX ADMIN — INSULIN LISPRO 4 UNITS: 100 INJECTION, SOLUTION INTRAVENOUS; SUBCUTANEOUS at 08:22

## 2018-03-23 RX ADMIN — FENTANYL CITRATE 25 MCG: 50 INJECTION, SOLUTION INTRAMUSCULAR; INTRAVENOUS at 17:26

## 2018-03-23 RX ADMIN — INSULIN LISPRO 2 UNITS: 100 INJECTION, SOLUTION INTRAVENOUS; SUBCUTANEOUS at 11:58

## 2018-03-23 RX ADMIN — SODIUM CHLORIDE, SODIUM LACTATE, POTASSIUM CHLORIDE, AND CALCIUM CHLORIDE 125 ML/HR: 600; 310; 30; 20 INJECTION, SOLUTION INTRAVENOUS at 20:30

## 2018-03-23 RX ADMIN — INSULIN LISPRO 4 UNITS: 100 INJECTION, SOLUTION INTRAVENOUS; SUBCUTANEOUS at 21:44

## 2018-03-23 RX ADMIN — Medication 10 MG: at 15:13

## 2018-03-23 NOTE — ROUTINE PROCESS
Bedside, Verbal and Written shift change report given to NAN Wilkinson (oncoming nurse) by Mey Anna. Vickey Méndez (offgoing nurse). Report included the following information SBAR, Kardex, MAR and Recent Results.

## 2018-03-23 NOTE — ROUTINE PROCESS
Bedside and Verbal shift change report given to Liza Luna RN (oncoming nurse) by Ursula Lau RN (offgoing nurse). Report included the following information SBAR and Kardex.

## 2018-03-23 NOTE — PERIOP NOTES
TRANSFER - OUT REPORT:    [facility]bal [facility] [address] [place] [address](unit) for routine progression of care       Report consisted of patients Situation, Background, Assessment and   Recommendations(SBAR). Information from the following report(s) SBAR, Kardex, OR Summary, Procedure Summary, MAR and Recent Results was reviewed with the receiving nurse. Lines:   Peripheral IV 03/21/18 Right Antecubital (Active)   Site Assessment Clean, dry, & intact 3/23/2018  1:45 PM   Phlebitis Assessment 0 3/23/2018  1:45 PM   Infiltration Assessment 0 3/23/2018  1:45 PM   Dressing Status Clean, dry, & intact 3/23/2018  1:45 PM   Dressing Type Tape;Transparent 3/23/2018  1:45 PM   Hub Color/Line Status Pink 3/23/2018  1:45 PM   Action Taken Open ports on tubing capped 3/22/2018  7:30 AM   Alcohol Cap Used Yes 3/23/2018  6:10 AM       Peripheral IV 03/23/18 Right Hand (Active)   Site Assessment Clean, dry, & intact 3/23/2018  6:00 PM   Phlebitis Assessment 0 3/23/2018  6:00 PM   Infiltration Assessment 0 3/23/2018  6:00 PM   Dressing Status Clean, dry, & intact 3/23/2018  6:00 PM   Dressing Type Tape;Transparent 3/23/2018  6:00 PM   Hub Color/Line Status Blue; Infusing 3/23/2018  6:00 PM        Opportunity for questions and clarification was provided.       Patient transported with:   O2 @ 2 liters  Registered Nurse  [facility]

## 2018-03-23 NOTE — PROGRESS NOTES
Problem: Falls - Risk of  Goal: *Absence of Falls  Document Lise Fall Risk and appropriate interventions in the flowsheet.    Outcome: Progressing Towards Goal  Fall Risk Interventions:  Mobility Interventions: Assess mobility with egress test, Communicate number of staff needed for ambulation/transfer, OT consult for ADLs, Patient to call before getting OOB, PT Consult for mobility concerns, PT Consult for assist device competence, Utilize walker, cane, or other assitive device         Medication Interventions: Evaluate medications/consider consulting pharmacy, Patient to call before getting OOB, Teach patient to arise slowly    Elimination Interventions: Call light in reach, Toileting schedule/hourly rounds, Urinal in reach    History of Falls Interventions: Consult care management for discharge planning, Door open when patient unattended, Evaluate medications/consider consulting pharmacy, Room close to nurse's station

## 2018-03-23 NOTE — PROGRESS NOTES
Pt refused 0300 vital signs twice. Once from nurse and once from CNA and also refused CHG bath. JUDY Matson aware.

## 2018-03-23 NOTE — ROUTINE PROCESS
19:30: Received verbal/bedside change of shift report from DANETTE Schwartz RN. Report included SBAR, KARDEX, MAR and recent results.

## 2018-03-23 NOTE — PROGRESS NOTES
0745 - Bedside shift change report given to Jesus Alvarez RN (oncoming nurse) by Celestino Schwab RN (offgoing nurse). Report included the following information SBAR, Kardex, ED Summary, Intake/Output, MAR and Recent Results. Summary: Shift uneventful. FSBS trending down. Preop checklist complete. Pt denies concerns/complaints. 1215 - TRANSFER - OUT REPORT:    Verbal report given to Charles Dumont RN (name) on Kanwal Erwin  being transferred to OR (unit) for ordered procedure       Report consisted of patients Situation, Background, Assessment and   Recommendations(SBAR). Information from the following report(s) SBAR, Kardex, ED Summary, Intake/Output, MAR and Recent Results was reviewed with the receiving nurse. Lines:   Peripheral IV 03/21/18 Right Antecubital (Active)   Site Assessment Clean, dry, & intact 3/23/2018  1:45 PM   Phlebitis Assessment 0 3/23/2018  1:45 PM   Infiltration Assessment 0 3/23/2018  1:45 PM   Dressing Status Clean, dry, & intact 3/23/2018  1:45 PM   Dressing Type Tape;Transparent 3/23/2018  1:45 PM   Hub Color/Line Status Pink 3/23/2018  1:45 PM   Action Taken Open ports on tubing capped 3/22/2018  7:30 AM   Alcohol Cap Used Yes 3/23/2018  6:10 AM       Peripheral IV 03/23/18 Right Hand (Active)   Site Assessment Clean, dry, & intact 3/23/2018  6:00 PM   Phlebitis Assessment 0 3/23/2018  6:00 PM   Infiltration Assessment 0 3/23/2018  6:00 PM   Dressing Status Clean, dry, & intact 3/23/2018  6:00 PM   Dressing Type Tape;Transparent 3/23/2018  6:00 PM   Hub Color/Line Status Blue; Infusing 3/23/2018  6:00 PM        Opportunity for questions and clarification was provided. 1845 - TRANSFER - IN REPORT:    Verbal report received from Karen Varghese RN (name) on Kanwal Erwin  being received from PACU (unit) for routine post - op      Report consisted of patients Situation, Background, Assessment and   Recommendations(SBAR).      Information from the following report(s) SBAR, Kardex, ED Summary, OR Summary, Intake/Output, MAR and Recent Results was reviewed with the receiving nurse. Opportunity for questions and clarification was provided. Assessment completed by Padmini Taylor RN upon patients arrival to unit and care assumed.

## 2018-03-23 NOTE — PROGRESS NOTES
Problem: Falls - Risk of  Goal: *Absence of Falls  Document Lise Fall Risk and appropriate interventions in the flowsheet.    Outcome: Progressing Towards Goal  Fall Risk Interventions:  Mobility Interventions: Assess mobility with egress test, Patient to call before getting OOB         Medication Interventions: Evaluate medications/consider consulting pharmacy, Teach patient to arise slowly, Patient to call before getting OOB    Elimination Interventions: Call light in reach, Patient to call for help with toileting needs, Toilet paper/wipes in reach    History of Falls Interventions: Consult care management for discharge planning, Door open when patient unattended, Evaluate medications/consider consulting pharmacy, Room close to nurse's station

## 2018-03-23 NOTE — PERIOP NOTES
Spoke to Dr. Tara Alvarado regarding pt blood sugar, orders received per [facility]

## 2018-03-23 NOTE — PROGRESS NOTES
0005-Resting in bed, eyes closed, respirations 16.    0110-Assessment complete. Pleasant, cooperative. Call light and personal items within reach. 0259-Refusing vital signs being done. Stating \"Get out, leave, you're going to leave right now\". Will ask CNA to get vital signs. 0430-In bed, resting quietly, eyes closed, respirations 16.    0610-Uncooperative. Does not want to be bothered. Shift Summary; Uneventful shift. Pastoral Care may be a good option for this patient. Chlorhexidine wipes not done during shift.

## 2018-03-23 NOTE — PERIOP NOTES
Handoff to [place]. Pt stable, no s/s of distress. Opportunity for questions provided.

## 2018-03-23 NOTE — PROGRESS NOTES
Pt has been seen by podiatry and pt with planned surgical intervention today. Please provide WB status and order PT when appropriate to assist with identifying an appropriate plan of care. CM continuing to follow.

## 2018-03-23 NOTE — PROGRESS NOTES
0730 - Bedside shift change report given to Osmany Hunt RN (oncoming nurse) by Lakeshia Peters RN (offgoing nurse). Report included the following information SBAR, Kardex, ED Summary, Intake/Output, MAR and Recent Results. Summary: Pt very comfortable throughout shift. Denies pain, other complaints. Working with glycemic control to stabilize blood glucose levels. HgA1C 11.3 and pt states has been battling diabetic foot ulcers for 12 years. Oldest current ulcer is 1.5 yrs, L foot. Pt states monitors regimen and wound care, for the most part, independently, and does not appear to consider current situation severe. Pt received MRI and xray of R foot today and was also seen by Dr. Bright Patrick, podiatry, this evening. Pt is scheduled for surgical intervention tomorrow morning. Pt also mentioned it has been 2 days since last BM and feels constipated. Started on miralax this afternoon and passed hard stool this evening, after this shift ended. Patient Vitals for the past 12 hrs:   Temp Pulse Resp BP SpO2   03/22/18 1650 98.3 °F (36.8 °C) 78 16 119/46 96 %   03/22/18 1141 98.1 °F (36.7 °C) 87 16 133/59 99 %     Lab Results   Component Value Date/Time    Glucose 399 (H) 03/21/2018 08:20 PM    Glucose (POC) 227 (H) 03/22/2018 04:53 PM     1945 - Bedside shift change report given to Beatrice Ayala RN (oncoming nurse) by Osmany Hunt RN (offgoing nurse). Report included the following information SBAR, Kardex, ED Summary, Intake/Output, MAR and Recent Results.

## 2018-03-23 NOTE — ANESTHESIA POSTPROCEDURE EVALUATION
Post-Anesthesia Evaluation & Assessment    Visit Vitals    /62    Pulse 67    Temp 36.6 °C (97.9 °F)    Resp 23    Ht 6' 1\" (1.854 m)    Wt 114.6 kg (252 lb 9.6 oz)    SpO2 100%    BMI 33.33 kg/m2       No untreated/active PONV    Post-operative hydration adequate. Adequate post-operative analgesia per PACU discharge criteria    Mental status & level of consciousness: alert and oriented x 3    Respiratory status: patent unassisted airway     No apparent anesthetic complications requiring additional post-anesthetic care    Patient has met all discharge requirements.             Destiny Muñoz MD

## 2018-03-23 NOTE — ANESTHESIA PREPROCEDURE EVALUATION
Anesthetic History   No history of anesthetic complications            Review of Systems / Medical History  Patient summary reviewed, nursing notes reviewed and pertinent labs reviewed    Pulmonary            Asthma : well controlled       Neuro/Psych   Within defined limits           Cardiovascular    Hypertension          CAD         GI/Hepatic/Renal  Within defined limits              Endo/Other    Diabetes         Other Findings            Physical Exam    Airway  Mallampati: II  TM Distance: 4 - 6 cm  Neck ROM: normal range of motion   Mouth opening: Normal     Cardiovascular    Rhythm: regular  Rate: normal         Dental    Dentition: Caps/crowns     Pulmonary  Breath sounds clear to auscultation               Abdominal  GI exam deferred       Other Findings            Anesthetic Plan    ASA: 3  Anesthesia type: MAC and general          Induction: Intravenous  Anesthetic plan and risks discussed with: Patient      MAC with GA backup

## 2018-03-23 NOTE — PROGRESS NOTES
1900: Patient arrived on floor. Primary Nurse Aleksandra Johnson and Kareem Hayden RN performed a dual skin assessment on this patient Impairment noted- see wound doc flow sheet  Himanshu score is 20  Scar mid upper chest, Ulcer to left lower foot, bulky dressing from surgery on right lower foot. 2021: C/O pain in Right Big toe. 7/10. Medicated for pain  2120: patient stated pain is better, still feels great toe discomfort but is tolerable. Medications given, no other issues  2300: Bedside shift change report given to Gilford Saunders, Rn (oncoming nurse) by Deepti Silva Rn (offgoing nurse). Report included the following information SBAR, Kardex, Procedure Summary, Intake/Output, MAR and Recent Results.

## 2018-03-23 NOTE — BRIEF OP NOTE
BRIEF OPERATIVE NOTE    Date of Procedure: 3/23/2018   Preoperative Diagnosis: infected right foot  Postoperative Diagnosis: infected right foot    Procedure(s):  RIGHT FOOT INCISION/DRAINAGE, BONE BIOPSY, AND RESECTION OF INFECTED BONE RIGHT FOOT  Surgeon(s) and Role:     * Kaila Pham MD - Primary         Assistant Staff: None      Surgical Staff:  Circ-1: Rexine Barthel  Radiology Technician: Desiree Roldan  Surg Asst-1: Siddhartha Lindsay  Event Time In   Incision Start 1522   Incision Close 1642     Anesthesia: General   Estimated Blood Loss: 2-5cc's  Specimens:   ID Type Source Tests Collected by Time Destination   1 : 1st metatarsal Wound Foot, Right CULTURE, ANAEROBIC, CULTURE, WOUND W GRAM STAIN Kaila Pham MD 3/23/2018 1616 Microbiology   2 : 1st arch Wound Foot, Right CULTURE, ANAEROBIC, CULTURE, WOUND W GRAM STAIN Kiala Pham MD 3/23/2018 1616 Microbiology   3 : 1st proximal phalanx Wound Foot, Right CULTURE, ANAEROBIC, CULTURE, WOUND W GRAM STAIN Kaila Pham MD 3/23/2018 1618 Microbiology      Findings: The patient tolerated the procedure and anesthesia well without complication.  Cellulitis and purulence <10cc' found in arch  Complications: none  Implants: * No implants in log *

## 2018-03-23 NOTE — PERIOP NOTES
TRANSFER - IN REPORT:    Verbal report received from ORN and CRNA(name) on Sierra Ranks  being received from OR(unit) for routine progression of care      Report consisted of patients Situation, Background, Assessment and   Recommendations(SBAR). Information from the following report(s) SBAR, Kardex, OR Summary, Procedure Summary, MAR and Recent Results was reviewed with the receiving nurse. Opportunity for questions and clarification was provided. Assessment completed upon patients arrival to unit and care assumed.

## 2018-03-23 NOTE — ROUTINE PROCESS
Bedside and Verbal shift change report given to Kwabena Chavez RN (oncoming nurse) by Quentin Saenz RN (offgoing nurse). Report included the following information SBAR and Kardex.

## 2018-03-23 NOTE — DIABETES MGMT
NUTRITION ASSESSMENT / 59 [facility] PLAN OF CARE     Jennifer Rock           71 y.o.              Patient Active Problem List   Diagnosis Code    Diabetic foot infection ([facility] E11.69, L08.9    HTN (hypertension) I10    Diabetic neuropathy (HCC) E11.40    Diabetic foot ulcer ([facility] E11.621, L97.509    Diabetes ([facility] E11.9    CAD (coronary artery disease) I25.10    Cellulitis L03.90        INTERVENTIONS/PLAN:   - BG out of target, known h/o of uncontrolled DM2, as far back in records as I can see, A1c has been >8% since 2013  - basal/bolus + corrective coverage insulin orders in place, BG trending down significantly since insulin on board, would not increase doses at this point for fear of hypo  - noted pt admitted with DM foot wound and plan for surgical intervention today, insulin needs may decrease once source of infection is addressed  - DM Education attempted per HOSP [facility] RD, will attempt again as time permits  - encouraged OP DM Self Management Class (schedule given)  - recommend  evaluate PTA DM regimen for changes prior to d/c    ASSESSMENT:   Nutritional Status: overweight, h/o excessive energy intake, h/o excessive CHO intake, questionable DM med adherence, gap in understanding between self-care and impact on health         Lab Results   Component Value Date/Time     (H) 03/23/2018 04:39 AM    GLUCPOC 205 (H) 03/23/2018 07:58 AM    GLUCPOC 266 (H) 03/22/2018 09:59 PM    GLUCPOC 227 (H) 03/22/2018 04:53 PM           Within target range (non-ICU: <140; ICU<180): [] Yes   [x]  No    Current Insulin regimen:   - Lantus 30 units every day  - Humalog 10 units qac  - Humalog Normal Insulin Sensitivity Corrective Coverage    Home medication/insulin regimen: per PTA list  - Humalog 75/25 insulin 50 units BID    HbA1c: equivalent  to ave BGlucose of 278 mg/dl for 2-3 months prior to admission    Lab Results   Component Value Date/Time    Hemoglobin A1c 11.3 (H) 03/22/2018 11:02 AM       Adequate glycemic control PTA:  [] Yes  [x] No       SUBJECTIVE/OBJECTIVE:   Information obtained from: chart; unable to obtain information from pt at time of visit, attempted to wake pt 4x, did not wake, will attempt again as time permits, appears F9S has been >8% since 2013 at least, on BID mixed insulin regimen at home        Medications: [x]                Reviewed        Labs:   Lab Results   Component Value Date/Time    Sodium 142 03/23/2018 04:39 AM    Potassium 3.9 03/23/2018 04:39 AM    Chloride 108 03/23/2018 04:39 AM    CO2 25 03/23/2018 04:39 AM    Anion gap 9 03/23/2018 04:39 AM    Glucose 196 (H) 03/23/2018 04:39 AM    BUN 16 03/23/2018 04:39 AM    Creatinine 0.78 03/23/2018 04:39 AM    Calcium 8.6 03/23/2018 04:39 AM    Albumin 3.1 (L) 03/21/2018 08:20 PM       Anthropometrics: IBW : 91.6 kg (202 lb), % IBW (Calculated): 125.05 %, BMI (calculated): 33.3  Wt Readings from Last 1 Encounters:   03/23/18 114.6 kg (252 lb 9.6 oz)    Ht Readings from Last 1 Encounters:   03/23/18 6' 1\" (1.854 m)       Estimated Nutrition Needs: 2275 Kcals/day (25 kcal/kg of IBW), Protein (g): 136 g (1.5 g/kg) Fluid (ml): 2275 ml (1 ml/kcal)  Based on:   []          Actual BW    [x]          IBW   []            Adjusted BW        Diet:   Active Orders   Diet    DIET NPO Past Midnight       Intake:   Patient Vitals for the past 100 hrs:   % Diet Eaten   03/22/18 1256 100 %   03/22/18 0844 100 %       Nutrition Diagnoses:   - altered nutrition-related lab values r/t Dm AEB A1C of 11.5% and known h/o DM2  - obesity r/t h/o excessive energy intake AEB BMI of 33.3%  - lack of adherence to DM regimen and nutrition recommendations r/t DM AEB A1C of 11.5% and observed gap in understanding between self-care and impact on health       Nutrition Interventions:   - education, diet Consistent CHO     Goal: .  - BG will be in target range of  mg/dl (non-ICU) by 3/29/18  - pt will maintain current wt/prevent wt gain by 4/23/18  - pt will follow up with OP PCP for DM by 4/23/18       Nutrition Monitoring and Evaluation      [x]     Monitor po intake on meal rounds  [x]     Continue inpatient monitoring and intervention  [x]     Other: BG      Nutrition Risk:  []   High     []  Moderate    [x]  Minimal/Uncompromised    JAQUAN Abrams, MPH, RD, CDE  Glycemic Control Team  Pager [phone] (M-F [phone])

## 2018-03-23 NOTE — PROGRESS NOTES
Hospitalist Progress Note    Patient: Waylon Donaldson MRN: [id number]  CSN: [id number]    YOB: 1945  Age: 71 y.o. Sex: male    DOA: 3/21/2018 LOS:  LOS: 1 day                Assessment/Plan     Patient Active Problem List   Diagnosis Code    Diabetic foot infection ([facility] E11.69, L08.9    HTN (hypertension) I10    Diabetic neuropathy ([facility] E11.40    Diabetic foot ulcer ([facility] E11.621, L97.509    Diabetes ([facility] E11.9    CAD (coronary artery disease) I25.10    Cellulitis L03.90    Osteomyelitis of right foot ([facility] M86.9            72 yo male admitted for right foot cellulitis, foot ulcer    Cellulitis/osteomyelitis/diabetic foot infection, Right - failed outpatient antibiotics. on vancomycin, follow blood and wound cultures. Keep right leg elevated. Podiatry plan for I&D right foot and resection of infected bone. .      DM - continue home insulin and start on SSI.     HTN - monitor BP     CAD - continue with aspirin     DVT prophylaxis    Disposition : 2-3 days    Review of systems  General: No fevers or chills. Cardiovascular: No chest pain or pressure. No palpitations. Pulmonary: No shortness of breath. Gastrointestinal: No nausea, vomiting. Physical Exam:  General: Awake, cooperative, no acute distress    HEENT: NC, Atraumatic. PERRLA, anicteric sclerae. Lungs: CTA Bilaterally. No Wheezing/Rhonchi/Rales. Heart:  Regular  rhythm,  No murmur, No Rubs, No Gallops  Abdomen: Soft, Non distended, Non tender.  +Bowel sounds,   Extremities: Right foot with dressing. diabetic foot ulcer right heel and bilateral MTP joint. Open wound right mid foot plantar aspect, with drainage. Psych:   Not anxious or agitated. Neurologic:  No acute neurological deficit.            Vital signs/Intake and Output:  Visit Vitals    /64    Pulse 73    Temp 97.9 °F (36.6 °C)    Resp 14    Ht 6' 1\" (1.854 m)    Wt 114.6 kg (252 lb 9.6 oz)    SpO2 100%    BMI 33.33 kg/m2     Current Shift:  03/23 0701 - 03/23 1900  In: 1000 [I.V.:1000]  Out: 770 [Urine:750]  Last three shifts:  03/21 1901 - 03/23 0700  In: 4554 [P.O.:720; I.V.:1020]  Out: 1700 [Urine:1700]            Labs: Results:       Chemistry Recent Labs      03/23/18 0439 03/21/18 2020   GLU  196*  399*   NA  142  138   K  3.9  4.6   CL  108  102   CO2  25  28   BUN  16  21*   CREA  0.78  1.18   CA  8.6  9.5   AGAP  9  8   BUCR  21* 18   AP   --   79   TP   --   7.7   ALB   --   3.1*   GLOB   --   4.6*   AGRAT   --   0.7*      CBC w/Diff Recent Labs      03/23/18 0439 03/21/18 2020   WBC  5.2  8.4   RBC  3.70*  4.31*   HGB  11.1*  13.1   HCT  33.3*  39.2   PLT  212  246   GRANS   --   74*   LYMPH   --   16*   EOS   --   3      Cardiac Enzymes No results for input(s): CPK, CKND1, NELLA in the last 72 hours. No lab exists for component: CKRMB, TROIP   Coagulation No results for input(s): PTP, INR, APTT in the last 72 hours. No lab exists for component: INREXT    Lipid Panel No results found for: CHOL, CHOLPOCT, CHOLX, CHLST, CHOLV, 846055, HDL, LDL, LDLC, DLDLP, 281016, VLDLC, VLDL, TGLX, TRIGL, TRIGP, TGLPOCT, CHHD, CHHDX   BNP No results for input(s): BNPP in the last 72 hours.    Liver Enzymes Recent Labs      03/21/18 2020   TP  7.7   ALB  3.1*   AP  79   SGOT  9*      Thyroid Studies No results found for: T4, T3U, TSH, TSHEXT     Procedures/imaging: see electronic medical records for all procedures/Xrays and details which were not copied into this note but were reviewed prior to creation of Plan

## 2018-03-24 LAB
ANION GAP SERPL CALC-SCNC: 8 MMOL/L (ref 3–18)
BACTERIA SPEC CULT: ABNORMAL
BUN SERPL-MCNC: 8 MG/DL (ref 7–18)
BUN/CREAT SERPL: 11 (ref 12–20)
CALCIUM SERPL-MCNC: 8.3 MG/DL (ref 8.5–10.1)
CHLORIDE SERPL-SCNC: 106 MMOL/L (ref 100–108)
CO2 SERPL-SCNC: 27 MMOL/L (ref 21–32)
CREAT SERPL-MCNC: 0.75 MG/DL (ref 0.6–1.3)
DATE LAST DOSE: NORMAL
ERYTHROCYTE [DISTWIDTH] IN BLOOD BY AUTOMATED COUNT: 13.3 % (ref 11.6–14.5)
GLUCOSE BLD STRIP.AUTO-MCNC: 200 MG/DL (ref 70–110)
GLUCOSE BLD STRIP.AUTO-MCNC: 213 MG/DL (ref 70–110)
GLUCOSE BLD STRIP.AUTO-MCNC: 225 MG/DL (ref 70–110)
GLUCOSE BLD STRIP.AUTO-MCNC: 260 MG/DL (ref 70–110)
GLUCOSE SERPL-MCNC: 226 MG/DL (ref 74–99)
GRAM STN SPEC: ABNORMAL
GRAM STN SPEC: ABNORMAL
HCT VFR BLD AUTO: 32.8 % (ref 36–48)
HGB BLD-MCNC: 10.8 G/DL (ref 13–16)
MCH RBC QN AUTO: 30.2 PG (ref 24–34)
MCHC RBC AUTO-ENTMCNC: 32.9 G/DL (ref 31–37)
MCV RBC AUTO: 91.6 FL (ref 74–97)
PLATELET # BLD AUTO: 196 K/UL (ref 135–420)
PMV BLD AUTO: 9.5 FL (ref 9.2–11.8)
POTASSIUM SERPL-SCNC: 4.1 MMOL/L (ref 3.5–5.5)
RBC # BLD AUTO: 3.58 M/UL (ref 4.7–5.5)
REPORTED DOSE,DOSE: NORMAL UNITS
REPORTED DOSE/TIME,TMG: 930
SERVICE CMNT-IMP: ABNORMAL
SODIUM SERPL-SCNC: 141 MMOL/L (ref 136–145)
VANCOMYCIN TROUGH SERPL-MCNC: 13.5 UG/ML (ref 10–20)
WBC # BLD AUTO: 7.1 K/UL (ref 4.6–13.2)

## 2018-03-24 PROCEDURE — 97162 PT EVAL MOD COMPLEX 30 MIN: CPT

## 2018-03-24 PROCEDURE — 74011636637 HC RX REV CODE- 636/637: Performed by: HOSPITALIST

## 2018-03-24 PROCEDURE — 36415 COLL VENOUS BLD VENIPUNCTURE: CPT | Performed by: HOSPITALIST

## 2018-03-24 PROCEDURE — 74011636637 HC RX REV CODE- 636/637: Performed by: PHYSICIAN ASSISTANT

## 2018-03-24 PROCEDURE — 85027 COMPLETE CBC AUTOMATED: CPT | Performed by: HOSPITALIST

## 2018-03-24 PROCEDURE — 80048 BASIC METABOLIC PNL TOTAL CA: CPT | Performed by: HOSPITALIST

## 2018-03-24 PROCEDURE — 80202 ASSAY OF VANCOMYCIN: CPT | Performed by: HOSPITALIST

## 2018-03-24 PROCEDURE — 74011250637 HC RX REV CODE- 250/637: Performed by: HOSPITALIST

## 2018-03-24 PROCEDURE — 74011250637 HC RX REV CODE- 250/637: Performed by: PHYSICIAN ASSISTANT

## 2018-03-24 PROCEDURE — 74011250636 HC RX REV CODE- 250/636: Performed by: PODIATRIST

## 2018-03-24 PROCEDURE — 82962 GLUCOSE BLOOD TEST: CPT

## 2018-03-24 PROCEDURE — 65270000029 HC RM PRIVATE

## 2018-03-24 PROCEDURE — 74011250636 HC RX REV CODE- 250/636: Performed by: HOSPITALIST

## 2018-03-24 RX ORDER — INSULIN LISPRO 100 [IU]/ML
INJECTION, SOLUTION INTRAVENOUS; SUBCUTANEOUS
Status: DISCONTINUED | OUTPATIENT
Start: 2018-03-24 | End: 2018-04-05 | Stop reason: HOSPADM

## 2018-03-24 RX ORDER — VANCOMYCIN 1.75 GRAM/500 ML IN 0.9 % SODIUM CHLORIDE INTRAVENOUS
1750 EVERY 12 HOURS
Status: COMPLETED | OUTPATIENT
Start: 2018-03-24 | End: 2018-03-29

## 2018-03-24 RX ADMIN — INSULIN GLARGINE 30 UNITS: 100 INJECTION, SOLUTION SUBCUTANEOUS at 09:21

## 2018-03-24 RX ADMIN — INSULIN LISPRO 4 UNITS: 100 INJECTION, SOLUTION INTRAVENOUS; SUBCUTANEOUS at 16:30

## 2018-03-24 RX ADMIN — INSULIN LISPRO 6 UNITS: 100 INJECTION, SOLUTION INTRAVENOUS; SUBCUTANEOUS at 22:05

## 2018-03-24 RX ADMIN — SODIUM CHLORIDE, SODIUM LACTATE, POTASSIUM CHLORIDE, AND CALCIUM CHLORIDE 125 ML/HR: 600; 310; 30; 20 INJECTION, SOLUTION INTRAVENOUS at 07:20

## 2018-03-24 RX ADMIN — SODIUM CHLORIDE, SODIUM LACTATE, POTASSIUM CHLORIDE, AND CALCIUM CHLORIDE 125 ML/HR: 600; 310; 30; 20 INJECTION, SOLUTION INTRAVENOUS at 02:49

## 2018-03-24 RX ADMIN — VANCOMYCIN HYDROCHLORIDE 1750 MG: 10 INJECTION, POWDER, LYOPHILIZED, FOR SOLUTION INTRAVENOUS at 22:00

## 2018-03-24 RX ADMIN — SODIUM CHLORIDE, SODIUM LACTATE, POTASSIUM CHLORIDE, AND CALCIUM CHLORIDE 125 ML/HR: 600; 310; 30; 20 INJECTION, SOLUTION INTRAVENOUS at 17:32

## 2018-03-24 RX ADMIN — INSULIN LISPRO 10 UNITS: 100 INJECTION, SOLUTION INTRAVENOUS; SUBCUTANEOUS at 17:32

## 2018-03-24 RX ADMIN — POLYETHYLENE GLYCOL 3350 17 G: 17 POWDER, FOR SOLUTION ORAL at 09:21

## 2018-03-24 RX ADMIN — INSULIN LISPRO 10 UNITS: 100 INJECTION, SOLUTION INTRAVENOUS; SUBCUTANEOUS at 09:20

## 2018-03-24 RX ADMIN — INSULIN LISPRO 4 UNITS: 100 INJECTION, SOLUTION INTRAVENOUS; SUBCUTANEOUS at 09:21

## 2018-03-24 RX ADMIN — ASPIRIN 81 MG 81 MG: 81 TABLET ORAL at 09:21

## 2018-03-24 RX ADMIN — VANCOMYCIN HYDROCHLORIDE 1500 MG: 10 INJECTION, POWDER, LYOPHILIZED, FOR SOLUTION INTRAVENOUS at 09:30

## 2018-03-24 RX ADMIN — HYDROMORPHONE HYDROCHLORIDE 1 MG: 2 INJECTION, SOLUTION INTRAMUSCULAR; INTRAVENOUS; SUBCUTANEOUS at 01:25

## 2018-03-24 RX ADMIN — INSULIN LISPRO 6 UNITS: 100 INJECTION, SOLUTION INTRAVENOUS; SUBCUTANEOUS at 13:17

## 2018-03-24 RX ADMIN — INSULIN LISPRO 10 UNITS: 100 INJECTION, SOLUTION INTRAVENOUS; SUBCUTANEOUS at 13:17

## 2018-03-24 RX ADMIN — HYDROMORPHONE HYDROCHLORIDE 1 MG: 2 INJECTION, SOLUTION INTRAMUSCULAR; INTRAVENOUS; SUBCUTANEOUS at 05:31

## 2018-03-24 RX ADMIN — ENOXAPARIN SODIUM 40 MG: 40 INJECTION SUBCUTANEOUS at 03:26

## 2018-03-24 NOTE — PROGRESS NOTES
Problem: Falls - Risk of  Goal: *Absence of Falls  Document Lise Fall Risk and appropriate interventions in the flowsheet.    Outcome: Progressing Towards Goal  Fall Risk Interventions:  Mobility Interventions: Patient to call before getting OOB, Utilize walker, cane, or other assitive device         Medication Interventions: Evaluate medications/consider consulting pharmacy, Teach patient to arise slowly, Patient to call before getting OOB    Elimination Interventions: Call light in reach, Patient to call for help with toileting needs, Toilet paper/wipes in reach    History of Falls Interventions: Consult care management for discharge planning, Door open when patient unattended, Evaluate medications/consider consulting pharmacy, Room close to nurse's station

## 2018-03-24 NOTE — PROGRESS NOTES
[facility]  [place] [address], [address] [facility]  Drs: Acacia Castillo, Peggy Cameron    Plan:  1. Dressing changed this A.M by me: iodoform packing, dry sterile dressing(4x4's, ABD's,kerlix, ace wrap). 2. Awaiting physical therapy consult for training complete non wt bearing on scooter    3. Continue antimicrobials    4. Nurses to continue daily dressing changes as described above. 5. Awaiting culture results and ID guidance. S/  Patient is seen today and is status post right foot: incision and drainage, resection of infected bone (about 1/3 of 1st metartarsal and total metatarsal phalangeal joint, bone biopsy, secondary closure of heel ulcer). He says that his pain is being managed and doesn't have much at this moment. No chills, but nausea that is being helped most by ginger ale. O/  Alert & oriented x 3 in NAD    Constitutional: wnl    Mr. Jaun Dan is status post right foot: incision and drainage, resection of infected bone (about 1/3 of 1st metartarsal and total metatarsal phalangeal joint, bone biopsy, secondary closure of heel ulcer). CFT wnl all digits. Marked decreased erythema and cellulitis in arch, negative drainage nor foul odor or or dolor, calor(improved). Results for Diandra Chun (MRN [id number]) as of 3/24/2018 11:33   Ref.  Range 3/21/2018 20:20 3/23/2018 04:39 3/24/2018 04:40   WBC Latest Ref Range: 4.6 - 13.2 K/uL 8.4 5.2 7.1   RBC Latest Ref Range: 4.70 - 5.50 M/uL 4.31 (L) 3.70 (L) 3.58 (L)   HGB Latest Ref Range: 13.0 - 16.0 g/dL 13.1 11.1 (L) 10.8 (L)   HCT Latest Ref Range: 36.0 - 48.0 % 39.2 33.3 (L) 32.8 (L)   MCV Latest Ref Range: 74.0 - 97.0 FL 91.0 90.0 91.6   MCH Latest Ref Range: 24.0 - 34.0 PG 30.4 30.0 30.2   MCHC Latest Ref Range: 31.0 - 37.0 g/dL 33.4 33.3 32.9   RDW Latest Ref Range: 11.6 - 14.5 % 13.2 13.1 13.3   PLATELET Latest Ref Range: 135 - 420 K/uL 246 212 196   MPV Latest Ref Range: 9.2 - 11.8 FL 10.2 9.8 9.5   Results for Genie Cutler (MRN [id number]) as of 3/24/2018 11:33   Ref. Range 3/21/2018 20:20 3/22/2018 11:02 3/23/2018 04:39 3/24/2018 04:40   Sodium Latest Ref Range: 136 - 145 mmol/L 138  142 141   Potassium Latest Ref Range: 3.5 - 5.5 mmol/L 4.6  3.9 4.1   Chloride Latest Ref Range: 100 - 108 mmol/L 102  108 106   CO2 Latest Ref Range: 21 - 32 mmol/L 28  25 27   Anion gap Latest Ref Range: 3.0 - 18 mmol/L 8  9 8   Glucose Latest Ref Range: 74 - 99 mg/dL 399 (H)  196 (H) 226 (H)   BUN Latest Ref Range: 7.0 - 18 MG/DL 21 (H)  16 8   Creatinine Latest Ref Range: 0.6 - 1.3 MG/DL 1.18  0.78 0.75   BUN/Creatinine ratio Latest Ref Range: 12 - 20   18  21 (H) 11 (L)   Calcium Latest Ref Range: 8.5 - 10.1 MG/DL 9.5  8.6 8.3 (L)     3/24/2018 10:01 AM - Nasir, Lab In [facility]       Component Results      Component Value Flag Ref Range Units Status     Special Requests: NO SPECIAL REQUESTS     Final     GRAM STAIN NO WBC'S SEEN     Final     GRAM STAIN NO ORGANISMS SEEN     Final     Culture result: MODERATE DIPHTHEROIDS (A)    Final     Culture result:  (A)    Final     FEW STAPHYLOCOCCUS SPECIES, COAGULASE NEGATIVE     Culture result:  (A)    Final     STREPTOCOCCI, BETA HEMOLYTIC GROUP B ISOLATED FROM BROTH ONLY     Culture result:  (A)    Final     BACILLUS SPECIES, NOT ANTHRACIS ISOLATED FROM BROTH ONLY     X-ray right foot: 3-  FINDINGS:  2 views of the foot were performed.      There are postsurgical changes of partial first ray amputation including distal  first metatarsal and partial proximal phalangeal resection. Expected  postoperative subcutaneous emphysema at the site of surgery.     Remaining osseous structures of the foot are intact.     IMPRESSION  IMPRESSION:  Expected postoperative subcutaneous emphysema with postsurgical changes of  partial first metatarsal and partial first proximal phalangeal amputation.

## 2018-03-24 NOTE — ROUTINE PROCESS
Bedside and Verbal shift change report given to Fina Banks RN (oncoming nurse) by Yessy Mccarty RN (offgoing nurse). Report included the following information SBAR and Kardex.

## 2018-03-24 NOTE — PROGRESS NOTES
Hospitalist Progress Note    Patient: Waylon Donaldson MRN: [id number]  CSN: [id number]    YOB: 1945  Age: 71 y.o. Sex: male    DOA: 3/21/2018 LOS:  LOS: 2 days                Assessment/Plan     Patient Active Problem List   Diagnosis Code    Diabetic foot infection ([facility] E11.69, L08.9    HTN (hypertension) I10    Diabetic neuropathy ([facility] E11.40    Diabetic foot ulcer ([facility] E11.621, L97.509    Diabetes ([facility] E11.9    CAD (coronary artery disease) I25.10    Cellulitis L03.90    Osteomyelitis of right foot ([facility] M86.9            72 yo male admitted for right foot cellulitis, foot ulcer    Cellulitis/osteomyelitis/diabetic foot infection, Right - failed outpatient antibiotics. on vancomycin,   Blood cultures with no growth  wound cultures with polymicrobial growth. Keep right leg elevated. s/p I&D right foot and resection of infected bone 03/23/2018. Follow intra op cultures.     DM - continue home insulin and start on SSI.     HTN - monitor BP     CAD - continue with aspirin     DVT prophylaxis    Disposition : 2-3 days    Review of systems  General: No fevers or chills. Cardiovascular: No chest pain or pressure. No palpitations. Pulmonary: No shortness of breath. Gastrointestinal: No nausea, vomiting. Physical Exam:  General: Awake, cooperative, no acute distress    HEENT: NC, Atraumatic. PERRLA, anicteric sclerae. Lungs: CTA Bilaterally. No Wheezing/Rhonchi/Rales. Heart:  Regular  rhythm,  No murmur, No Rubs, No Gallops  Abdomen: Soft, Non distended, Non tender.  +Bowel sounds,   Extremities: Right foot with dressing. diabetic foot ulcer right heel and bilateral MTP joint. Open wound right mid foot plantar aspect, with drainage. Psych:   Not anxious or agitated. Neurologic:  No acute neurological deficit.            Vital signs/Intake and Output:  Visit Vitals    /55 (BP 1 Location: Right arm, BP Patient Position: At rest)    Pulse 85    Temp 98.7 °F (37.1 °C)  Resp 16    Ht 6' 1\" (1.854 m)    Wt 119 kg (262 lb 6.4 oz)    SpO2 95%    BMI 34.62 kg/m2     Current Shift:  03/24 0701 - 03/24 1900  In: 3373.9 [P.O.:144; I.V.:3229.9]  Out: 200 [Urine:200]  Last three shifts:  03/22 1901 - 03/24 0700  In: 1520 [I.V.:1520]  Out: 5321 [Urine:2575]            Labs: Results:       Chemistry Recent Labs      03/24/18 0440 03/23/18 0439 03/21/18 2020   GLU  226*  196*  399*   NA  141  142  138   K  4.1  3.9  4.6   CL  106  108  102   CO2  27  25  28   BUN  8  16  21*   CREA  0.75  0.78  1.18   CA  8.3*  8.6  9.5   AGAP  8  9  8   BUCR  11*  21*  18   AP   --    --   79   TP   --    --   7.7   ALB   --    --   3.1*   GLOB   --    --   4.6*   AGRAT   --    --   0.7*      CBC w/Diff Recent Labs      03/24/18 0440 03/23/18 0439 03/21/18 2020   WBC  7.1  5.2  8.4   RBC  3.58*  3.70*  4.31*   HGB  10.8*  11.1*  13.1   HCT  32.8*  33.3*  39.2   PLT  196  212  246   GRANS   --    --   74*   LYMPH   --    --   16*   EOS   --    --   3      Cardiac Enzymes No results for input(s): CPK, CKND1, NELLA in the last 72 hours. No lab exists for component: CKRMB, TROIP   Coagulation No results for input(s): PTP, INR, APTT in the last 72 hours. No lab exists for component: INREXT, INREXT    Lipid Panel No results found for: CHOL, CHOLPOCT, CHOLX, CHLST, CHOLV, 031511, HDL, LDL, LDLC, DLDLP, 751875, VLDLC, VLDL, TGLX, TRIGL, TRIGP, TGLPOCT, CHHD, CHHDX   BNP No results for input(s): BNPP in the last 72 hours.    Liver Enzymes Recent Labs      03/21/18 2020   TP  7.7   ALB  3.1*   AP  79   SGOT  9*      Thyroid Studies No results found for: T4, T3U, TSH, TSHEXT, TSHEXT     Procedures/imaging: see electronic medical records for all procedures/Xrays and details which were not copied into this note but were reviewed prior to creation of Plan

## 2018-03-24 NOTE — PROGRESS NOTES
3560-Assessment complete. Call light and personal items within reach. Denies need for pain medication at this time. Pleasant. Resting quietly in bed.    0125-Medicated for pain. Stable. 0531-Medicated for pain. Chlorhexidine wipes not done at this time, wanted to wait. Shift Summary:  Uneventful shift. Rested quietly. Medicated per orders.

## 2018-03-24 NOTE — ROUTINE PROCESS
Bedside and Verbal shift change report given to Kizzy Bai RN (oncoming nurse) by [place], RN (offgoing nurse). Report included the following information SBAR and Kardex.

## 2018-03-24 NOTE — OP NOTES
[facility] [place]  OPERATIVE REPORT    Kaylee Medeiros  MR#: [id number]  : 1945  ACCOUNT #: [de-identified]   DATE OF SERVICE: 2018    SURGEON:  Yoandy Guaman DPM    PREOPERATIVE DIAGNOSIS:  Right foot diabetic infection with abscess and osteomyelitis. POSTOPERATIVE DIAGNOSIS:  Right foot diabetic infection with abscess and osteomyelitis. PROCEDURES PERFORMED:    1. Incision and drainage, right foot. 2.  Resection of infected bone (part of the first metatarsal and the first proximal phalanx in part were resected) and secondary closure of the heel ulceration after debridement and flush of all of the wounds with 3000 mL of normal saline plain. 3. Biopsy of infected right foot first metatarsal    ASSISTANT:  Nurse Vitale    ANESTHESIA:  General.    ESTIMATED BLOOD LOSS:  2-5cc's    SPECIMENS REMOVED:  Bone    COMPLICATIONS:  None    IMPLANTS:  Iodoform bertha . 5\"     OPERATION:  The patient was taken to the OR and placed in the supine position on the operating table where general anesthesia was achieved. The right foot was prepped and draped in the usual sterile manner. Webril was placed around the right ankle, a tourniquet was placed at that level and inflated to 250 mmHg. The tourniquet time was 80 minutes. The right foot was prepped and draped in usual sterile manner and then a sterile #15 blade was utilized for making a curvy and semielliptical incision of the plantar aspect of the first metatarsophalangeal joint. The ulceration was in the center and it was completely resected, and then blunt and sharp dissection retracting neurovascular structures was performed and part of the first metatarsal was resected with a power sagittal saw along with the first proximal phalanx. Reattachment of the extensor tendon was performed, which was damaged with 3-0 Vicryl.   Next, a 15 blade was utilized to make an incision into the arch for incision and drainage and purulent drainage and cellulitis were present. Then after incision and flush, redness faded. The flush of all the wounds was done after cultures were taken along with bone biopsy performed as well of the first metatarsal head and sesamoid. Next, after the wounds were flushed with copious amounts of normal saline, the plantar wound was in part closed with 2-0 nylon and packed with the proximal aspect open with half inch iodoform gauze. The plantar arch wound was also in part closed with 2-0 nylon and the heel ulceration which had been secondarily closed also closed with 2-0 nylon. An additional 20 mL of 0.5% Marcaine plain was injected into the right foot, and preoperatively 20 mL of 0.5% Marcaine plain was also injected. There were no complications. The wound was then covered with 4 x 4 fluffs, ABDs, and a Kerlix and an Ace wrap. The patient tolerated the procedure and anesthesia well and was taken to recovery room.       DANA Alcantara / [place]  D: 03/23/2018 17:02     T: 03/23/2018 22:49  JOB #: [id number]  CC: Patricio Sosa DPM  [address]  CC: Vickie Nieto MD

## 2018-03-24 NOTE — PROGRESS NOTES
Problem: Mobility Impaired (Adult and Pediatric)  Goal: *Acute Goals and Plan of Care (Insert Text)  In 1-7 days pt will be able to perform:  ST.  Bed mobility:  Rolling L to R to L modified independent for positioning. 2.  Supine to sit to supine S with HR for meals. 3.  Sit to stand to sit S with knee scooter in prep for ambulation. LT.  Gait:  Ambulate >100ft S with knee scooter NWB, for home/community mobility. 2.  Stair Negotiation:  Ascend/descend >3 steps CGA with HR NWB on R for home entry. 3.  Patient/Family Education:  Patient/family to be independent with HEP for follow-up care and safe discharge. physical Therapy EVALUATION    Patient: Kanwal Erwin (51 y.o. male)  Date: 3/24/2018  Primary Diagnosis: Diabetic foot infection ([facility]  infected right foot  Procedure(s) (LRB):  RIGHT FOOT INCISION/DRAINAGE, BONE BIOPSY, AND RESECTION OF INFECTED BONE RIGHT FOOT (Right) 1 Day Post-Op   Precautions:   Fall, NWB    ASSESSMENT :  Based on the objective data described below, the patient presents with decreased functional mobility and independence in regard to bed mobility, transfers, gt quality and tolerance, generalized strength, pain, stair negotiation and safety due to R foot infection. Pt is NWB on R and has bandaged L foot due to open wound. Pt has diabetic shoes and will need L diabetic shoe on for future sessions. Pt rating pain in B feet 7/10 on numerical pain scale. MD has ordered knee scooter for pt however knee scooter has not arrived to pt yet. Pt is S for supine<>sit. Pt does have manual w/c at home if needed. Pt not willing to attempt sit to stand w/ RW NWB on R as he was apprehensive regarding NWB. Pt desires to await knee scooter for gt training at this time. Pt returned to supine in bed w/ all needs within reach. Pt encouraged to perform hip and knee ROM/activity to avoid weakness. Discussed concept and use of knee scooter w/ pt.    Pt need medical transport home if unable to hop up entry steps. Nurse Yumiko Medina aware. Recommend HH upon hospital d/c.  Pt does not want rehab. Patient will benefit from skilled intervention to address the above impairments. Patients rehabilitation potential is considered to be Fair  Factors which may influence rehabilitation potential include:   []         None noted  []         Mental ability/status  []         Medical condition  []         Home/family situation and support systems  []         Safety awareness  []         Pain tolerance/management  []         Other:      PLAN :  Recommendations and Planned Interventions:  []           Bed Mobility Training             []    Neuromuscular Re-Education  []           Transfer Training                   []    Orthotic/Prosthetic Training  []           Gait Training                          []    Modalities  []           Therapeutic Exercises          []    Edema Management/Control  []           Therapeutic Activities            []    Patient and Family Training/Education  []           Other (comment):    Frequency/Duration: Patient will be followed by physical therapy 1-2 times per day/4-7 days per week to address goals. Discharge Recommendations: Home Health  Further Equipment Recommendations for Discharge: knee scooter     SUBJECTIVE:   Patient stated it is going to be hard not to use this leg.     OBJECTIVE DATA SUMMARY:     Past Medical History:   Diagnosis Date    Asthma     CAD (coronary artery disease)     Diabetes ([facility] [address]     Diabetic foot ulcer ([facility] [address] [facility]     Diabetic neuropathy ([facility] [facility]     HTN (hypertension)      Past Surgical History:   Procedure Laterality Date    CARDIAC SURG PROCEDURE UNLIST  1998    bypass    HX ORTHOPAEDIC       Barriers to Learning/Limitations: None  Compensate with: visual, verbal, tactile, kinesthetic cues/model  Prior Level of Function/Home Situation:  Home Situation  Home Environment: Private residence  # Steps to Enter: 4  Rails to Enter: Yes  [employer] : Bilateral  One/Two Story Residence: One story  Living Alone: No  Support Systems: Spouse/Significant Other/Partner, Family member(s), Friends \ neighbors  Patient Expects to be Discharged to[de-identified] Private residence  Current DME Used/Available at Home: Víctor Oris, straight, Lubertha Evener, Wheelchair, Shower chair  Critical Behavior:  Neurologic State: Alert; Appropriate for age  Orientation Level: Oriented X4  Cognition: Appropriate decision making; Appropriate for age attention/concentration; Appropriate safety awareness; Follows commands  Safety/Judgement: Awareness of environment  Psychosocial  Patient Behaviors: Calm; Cooperative  Purposeful Interaction: Yes  Pt Identified Daily Priority: Clinical issues (comment); Communication issues (comment)  Caritas Process: Nurture loving kindness;Establish trust;Attend basic human needs; Teaching/learning  Caring Interventions: Reassure  Reassure: Therapeutic listening;Caring rounds  Skin Condition/Temp: Dry;Warm  Skin Integrity: Wound (add Wound LDA)  Skin Integumentary  Skin Color: Appropriate for ethnicity  Skin Condition/Temp: Dry;Warm  Skin Integrity: Wound (add Wound LDA)  Turgor: Non-tenting  Hair Growth: Present  Varicosities: Absent  Strength:    Strength: Generally decreased, functional  Tone & Sensation:   Tone: Normal  Sensation: Intact  Range Of Motion:  AROM: Generally decreased, functional  Functional Mobility:  Bed Mobility:  Rolling: Supervision  Supine to Sit: Supervision  Sit to Supine: Supervision  Scooting: Supervision  Transfers:  Balance:   Sitting: Intact  Ambulation/Gait Training:  Right Side Weight Bearing: Non-weight bearing  Interventions: Safety awareness training; Tactile cues; Verbal cues; Visual/Demos  Therapeutic Exercises:   Pain:  Pain Scale 1: Numeric (0 - 10)  Pain Intensity 1: 7  Pain Location 1: Foot  Pain Orientation 1: Right;Left  Pain Description 1: Shooting  Pain Intervention(s) 1: Medication (see MAR)  Activity Tolerance:   Fair-  Please refer to the flowsheet for vital signs taken during this treatment. After treatment:   []         Patient left in no apparent distress sitting up in chair  [x]         Patient left in no apparent distress in bed  [x]         Call bell left within reach  [x]         Nursing notified  []         Caregiver present  []         Bed alarm activated    COMMUNICATION/EDUCATION:   [x]         Fall prevention education was provided and the patient/caregiver indicated understanding. [x]         Patient/family have participated as able in goal setting and plan of care. [x]         Patient/family agree to work toward stated goals and plan of care. []         Patient understands intent and goals of therapy, but is neutral about his/her participation. []         Patient is unable to participate in goal setting and plan of care. Thank you for this referral.  Jacquelyn Moody, PT   Time Calculation: 20 mins      Eval Complexity: History: HIGH Complexity :3+ comorbidities / personal factors will impact the outcome/ POC Exam:MEDIUM Complexity : 3 Standardized tests and measures addressing body structure, function, activity limitation and / or participation in recreation  Presentation: MEDIUM Complexity : Evolving with changing characteristics  Clinical Decision Making:High Complexity unable at stand Overall Complexity:MEDIUM     Mobility  Current  CJ= 20-39%   Goal  CI= 1-19%. The severity rating is based on the Level of Assistance required for Functional Mobility and ADLs.

## 2018-03-25 ENCOUNTER — APPOINTMENT (OUTPATIENT)
Dept: GENERAL RADIOLOGY | Age: 73
DRG: 629 | End: 2018-03-25
Attending: INTERNAL MEDICINE
Payer: MEDICARE

## 2018-03-25 LAB
ANION GAP SERPL CALC-SCNC: 7 MMOL/L (ref 3–18)
BUN SERPL-MCNC: 13 MG/DL (ref 7–18)
BUN/CREAT SERPL: 16 (ref 12–20)
CALCIUM SERPL-MCNC: 8.5 MG/DL (ref 8.5–10.1)
CHLORIDE SERPL-SCNC: 109 MMOL/L (ref 100–108)
CO2 SERPL-SCNC: 26 MMOL/L (ref 21–32)
CREAT SERPL-MCNC: 0.79 MG/DL (ref 0.6–1.3)
ERYTHROCYTE [DISTWIDTH] IN BLOOD BY AUTOMATED COUNT: 13.2 % (ref 11.6–14.5)
GLUCOSE BLD STRIP.AUTO-MCNC: 147 MG/DL (ref 70–110)
GLUCOSE BLD STRIP.AUTO-MCNC: 266 MG/DL (ref 70–110)
GLUCOSE BLD STRIP.AUTO-MCNC: 270 MG/DL (ref 70–110)
GLUCOSE SERPL-MCNC: 143 MG/DL (ref 74–99)
HCT VFR BLD AUTO: 30.9 % (ref 36–48)
HGB BLD-MCNC: 10.2 G/DL (ref 13–16)
MCH RBC QN AUTO: 30.1 PG (ref 24–34)
MCHC RBC AUTO-ENTMCNC: 33 G/DL (ref 31–37)
MCV RBC AUTO: 91.2 FL (ref 74–97)
PLATELET # BLD AUTO: 176 K/UL (ref 135–420)
PMV BLD AUTO: 9.4 FL (ref 9.2–11.8)
POTASSIUM SERPL-SCNC: 3.6 MMOL/L (ref 3.5–5.5)
RBC # BLD AUTO: 3.39 M/UL (ref 4.7–5.5)
SODIUM SERPL-SCNC: 142 MMOL/L (ref 136–145)
WBC # BLD AUTO: 6.1 K/UL (ref 4.6–13.2)

## 2018-03-25 PROCEDURE — 74011250637 HC RX REV CODE- 250/637: Performed by: INTERNAL MEDICINE

## 2018-03-25 PROCEDURE — 97530 THERAPEUTIC ACTIVITIES: CPT

## 2018-03-25 PROCEDURE — 74011250637 HC RX REV CODE- 250/637: Performed by: HOSPITALIST

## 2018-03-25 PROCEDURE — 74011250636 HC RX REV CODE- 250/636: Performed by: HOSPITALIST

## 2018-03-25 PROCEDURE — 74011250637 HC RX REV CODE- 250/637: Performed by: PHYSICIAN ASSISTANT

## 2018-03-25 PROCEDURE — 85027 COMPLETE CBC AUTOMATED: CPT | Performed by: HOSPITALIST

## 2018-03-25 PROCEDURE — 74011636637 HC RX REV CODE- 636/637: Performed by: PHYSICIAN ASSISTANT

## 2018-03-25 PROCEDURE — 36415 COLL VENOUS BLD VENIPUNCTURE: CPT | Performed by: HOSPITALIST

## 2018-03-25 PROCEDURE — 74011636637 HC RX REV CODE- 636/637: Performed by: HOSPITALIST

## 2018-03-25 PROCEDURE — 74011250636 HC RX REV CODE- 250/636: Performed by: PODIATRIST

## 2018-03-25 PROCEDURE — 82962 GLUCOSE BLOOD TEST: CPT

## 2018-03-25 PROCEDURE — 65270000029 HC RM PRIVATE

## 2018-03-25 PROCEDURE — 71045 X-RAY EXAM CHEST 1 VIEW: CPT

## 2018-03-25 PROCEDURE — 80048 BASIC METABOLIC PNL TOTAL CA: CPT | Performed by: HOSPITALIST

## 2018-03-25 RX ORDER — GUAIFENESIN 100 MG/5ML
100 SOLUTION ORAL
Status: DISCONTINUED | OUTPATIENT
Start: 2018-03-25 | End: 2018-04-05 | Stop reason: HOSPADM

## 2018-03-25 RX ADMIN — INSULIN LISPRO 9 UNITS: 100 INJECTION, SOLUTION INTRAVENOUS; SUBCUTANEOUS at 13:03

## 2018-03-25 RX ADMIN — POLYETHYLENE GLYCOL 3350 17 G: 17 POWDER, FOR SOLUTION ORAL at 13:02

## 2018-03-25 RX ADMIN — HYDROMORPHONE HYDROCHLORIDE 1 MG: 2 INJECTION, SOLUTION INTRAMUSCULAR; INTRAVENOUS; SUBCUTANEOUS at 18:03

## 2018-03-25 RX ADMIN — VANCOMYCIN HYDROCHLORIDE 1750 MG: 10 INJECTION, POWDER, LYOPHILIZED, FOR SOLUTION INTRAVENOUS at 22:06

## 2018-03-25 RX ADMIN — ENOXAPARIN SODIUM 40 MG: 40 INJECTION SUBCUTANEOUS at 03:05

## 2018-03-25 RX ADMIN — INSULIN LISPRO 10 UNITS: 100 INJECTION, SOLUTION INTRAVENOUS; SUBCUTANEOUS at 09:14

## 2018-03-25 RX ADMIN — GUAIFENESIN 100 MG: 200 SOLUTION ORAL at 05:44

## 2018-03-25 RX ADMIN — VANCOMYCIN HYDROCHLORIDE 1750 MG: 10 INJECTION, POWDER, LYOPHILIZED, FOR SOLUTION INTRAVENOUS at 09:21

## 2018-03-25 RX ADMIN — INSULIN GLARGINE 30 UNITS: 100 INJECTION, SOLUTION SUBCUTANEOUS at 09:14

## 2018-03-25 RX ADMIN — SODIUM CHLORIDE, SODIUM LACTATE, POTASSIUM CHLORIDE, AND CALCIUM CHLORIDE 125 ML/HR: 600; 310; 30; 20 INJECTION, SOLUTION INTRAVENOUS at 19:16

## 2018-03-25 RX ADMIN — INSULIN LISPRO 10 UNITS: 100 INJECTION, SOLUTION INTRAVENOUS; SUBCUTANEOUS at 13:01

## 2018-03-25 RX ADMIN — INSULIN LISPRO 10 UNITS: 100 INJECTION, SOLUTION INTRAVENOUS; SUBCUTANEOUS at 18:03

## 2018-03-25 RX ADMIN — INSULIN LISPRO 9 UNITS: 100 INJECTION, SOLUTION INTRAVENOUS; SUBCUTANEOUS at 18:04

## 2018-03-25 RX ADMIN — ASPIRIN 81 MG 81 MG: 81 TABLET ORAL at 09:13

## 2018-03-25 RX ADMIN — INSULIN LISPRO 9 UNITS: 100 INJECTION, SOLUTION INTRAVENOUS; SUBCUTANEOUS at 22:05

## 2018-03-25 NOTE — PROGRESS NOTES
Patient had a large bowel movement, dental hygiene performed at this time. Physical therapy will be coming to see patient today. 1400hrs Physical therapy at this time. 1930hrs Bedside, Verbal and Written shift change report given to  Morelia Srivastava RN (oncoming nurse) by Merline Ash RN (offgoing nurse). Report included the following information SBAR, Kardex, Procedure Summary, Intake/Output, MAR, Accordion, Recent Results and Med Rec Status. All questions answered.

## 2018-03-25 NOTE — PROGRESS NOTES
1200hrs Dr Lakeisha Tamez perfomed a dressing change to the right foot. Bedside, Verbal and Written shift change report given to [place], RN (oncoming nurse) by Johanna Singh RN (offgoing nurse). Report included the following information SBAR, Kardex, Procedure Summary, Intake/Output, MAR, Accordion, Recent Results and Med Rec Status. All questions answered. Patient denies pain at this time.

## 2018-03-25 NOTE — PROGRESS NOTES
No changes in patient over night. 0500: Patient complains of SOB, states history of bronchitis and only thing that works is robitussin. \"Albuterol inhalers do not work\" Dr. Morena Knapp notified. Ordered chest XR and robitussin. O2 stats within normal.     Patient Vitals for the past 12 hrs:   Temp Pulse Resp BP SpO2   03/25/18 0408 99.3 °F (37.4 °C) 83 16 120/40 93 %   03/24/18 2232 98.7 °F (37.1 °C) 87 18 154/53 97 %   03/24/18 2020 98.6 °F (37 °C) 93 16 144/56 96 %     Bedside shift change report given to Junaid Schreiber Rn (oncoming nurse) by Mariela Andersen Rn (offgoing nurse). Report included the following information SBAR, Procedure Summary, Intake/Output, MAR and Recent Results.

## 2018-03-25 NOTE — PROGRESS NOTES
Hospitalist Progress Note    Patient: Teresita Dahl MRN: [id number]  CSN: [id number]    YOB: 1945  Age: 71 y.o. Sex: male    DOA: 3/21/2018 LOS:  LOS: 3 days                Assessment/Plan     Patient Active Problem List   Diagnosis Code    Diabetic foot infection ([facility] E11.69, L08.9    HTN (hypertension) I10    Diabetic neuropathy ([facility] E11.40    Diabetic foot ulcer ([facility] E11.621, L97.509    Diabetes ([facility] E11.9    CAD (coronary artery disease) I25.10    Cellulitis L03.90    Osteomyelitis of right foot ([facility] M86.9            72 yo male admitted for right foot cellulitis, foot ulcer    Cellulitis/osteomyelitis/diabetic foot infection, Right - failed outpatient antibiotics. on vancomycin,   Blood cultures with no growth  wound cultures with polymicrobial growth. Keep right leg elevated. s/p I&D right foot and resection of infected bone 03/23/2018. Follow intra op cultures.     DM - continue home insulin and start on SSI.     HTN - monitor BP     CAD - continue with aspirin     DVT prophylaxis    Disposition : 2-3 days    Review of systems  General: No fevers or chills. Cardiovascular: No chest pain or pressure. No palpitations. Pulmonary: No shortness of breath. Gastrointestinal: No nausea, vomiting. Physical Exam:  General: Awake, cooperative, no acute distress    HEENT: NC, Atraumatic. PERRLA, anicteric sclerae. Lungs: CTA Bilaterally. No Wheezing/Rhonchi/Rales. Heart:  Regular  rhythm,  No murmur, No Rubs, No Gallops  Abdomen: Soft, Non distended, Non tender.  +Bowel sounds,   Extremities: Right foot with dressing. diabetic foot ulcer right heel and bilateral MTP joint. Open wound right mid foot plantar aspect, with drainage. Psych:   Not anxious or agitated. Neurologic:  No acute neurological deficit.            Vital signs/Intake and Output:  Visit Vitals    /58 (BP 1 Location: Right arm, BP Patient Position: Sitting)    Pulse 87    Temp 99.2 °F (37.3 °C)  Resp 16    Ht 6' 1\" (1.854 m)    Wt 122.6 kg (270 lb 3.2 oz)    SpO2 97%    BMI 35.65 kg/m2     Current Shift:  03/25 0701 - 03/25 1900  In: 780 [P.O.:780]  Out: 1000 [Urine:1000]  Last three shifts:  03/23 1901 - 03/25 0700  In: 3873.9 [P.O.:144; I.V.:3729.9]  Out: 0026 [Urine:2375]            Labs: Results:       Chemistry Recent Labs      03/25/18 0525 03/24/18 0440 03/23/18   0439   GLU  143*  226*  196*   NA  142  141  142   K  3.6  4.1  3.9   CL  109*  106  108   CO2  26  27  25   BUN  13  8  16   CREA  0.79  0.75  0.78   CA  8.5  8.3*  8.6   AGAP  7  8  9   BUCR  16  11*  21*      CBC w/Diff Recent Labs      03/25/18 0525 03/24/18 0440 03/23/18 0439   WBC  6.1  7.1  5.2   RBC  3.39*  3.58*  3.70*   HGB  10.2*  10.8*  11.1*   HCT  30.9*  32.8*  33.3*   PLT  176  196  212      Cardiac Enzymes No results for input(s): CPK, CKND1, NELLA in the last 72 hours. No lab exists for component: CKRMB, TROIP   Coagulation No results for input(s): PTP, INR, APTT in the last 72 hours. No lab exists for component: INREXT, INREXT    Lipid Panel No results found for: CHOL, CHOLPOCT, CHOLX, CHLST, CHOLV, 775898, HDL, LDL, LDLC, DLDLP, 526590, VLDLC, VLDL, TGLX, TRIGL, TRIGP, TGLPOCT, CHHD, CHHDX   BNP No results for input(s): BNPP in the last 72 hours. Liver Enzymes No results for input(s): TP, ALB, TBIL, AP, SGOT, GPT in the last 72 hours.     No lab exists for component: DBIL   Thyroid Studies No results found for: T4, T3U, TSH, TSHEXT, TSHEXT     Procedures/imaging: see electronic medical records for all procedures/Xrays and details which were not copied into this note but were reviewed prior to creation of Plan

## 2018-03-25 NOTE — PROGRESS NOTES
Pharmacy Dosing Services: Vancomycin    Consult for Vancomycin Dosing by Pharmacy by Dr. Juan Del Rio provided for this 71y.o. year old male , for indication of skin and soft tissue infection / osteomyelitis. Day of Therapy 3 of 7   Scr = 0.75  CrCl > 100 ml/min   WBC = 7.1      Vancomycin Trough:  13.5 (3/24/18 at 19:30)    Adjusted dose to:  Vancomycin 1750 mg IV every 12 hours, starting 3/24/18 at 21:00. (for 9 more doses)  Increased today to reach goal trough:  15 - 20       Ht Readings from Last 1 Encounters:   03/23/18 185.4 cm (73\")        Wt Readings from Last 1 Encounters:   03/23/18 119 kg (262 lb 6.4 oz)      Previous Regimen Vancomycin 1500 mg IV every 12 hours   Other Current Antibiotics none   Significant Cultures pending   Serum Creatinine Lab Results   Component Value Date/Time    Creatinine 0.75 03/24/2018 04:40 AM      Creatinine Clearance Estimated Creatinine Clearance: 122 mL/min (based on Cr of 0.75). BUN Lab Results   Component Value Date/Time    BUN 8 03/24/2018 04:40 AM      WBC Lab Results   Component Value Date/Time    WBC 7.1 03/24/2018 04:40 AM      H/H Lab Results   Component Value Date/Time    HGB 10.8 (L) 03/24/2018 04:40 AM      Platelets Lab Results   Component Value Date/Time    PLATELET 886 52/93/7559 04:40 AM      Temp 98.6 °F (37 °C)     Pharmacy to follow daily and will make changes to dose and/or frequency based on clinical status.   Pharmacist Alley Ta, [facility]

## 2018-03-25 NOTE — PROGRESS NOTES
Problem: Mobility Impaired (Adult and Pediatric)  Goal: *Acute Goals and Plan of Care (Insert Text)  In 1-7 days pt will be able to perform:  ST.  Bed mobility:  Rolling L to R to L modified independent for positioning. 2.  Supine to sit to supine S with HR for meals. 3.  Sit to stand to sit S with knee scooter in prep for ambulation. LT.  Gait:  Ambulate >100ft S with knee scooter NWB, for home/community mobility. 2.  Stair Negotiation:  Ascend/descend >3 steps CGA with HR NWB on R for home entry. 3.  Patient/Family Education:  Patient/family to be independent with HEP for follow-up care and safe discharge. Outcome: Progressing Towards Goal  physical Therapy TREATMENT    Patient: Padmini Adam (85 y.o. male)  Date: 3/25/2018  Diagnosis: Diabetic foot infection ([address]  infected right foot Diabetic foot infection ([address]  Procedure(s) (LRB):  RIGHT FOOT INCISION/DRAINAGE, BONE BIOPSY, AND RESECTION OF INFECTED BONE RIGHT FOOT (Right) 2 Days Post-Op  Precautions: Fall, NWB   Chart, physical therapy assessment, plan of care and goals were reviewed. ASSESSMENT:  Knee scooter has not arrived as of yet. Pt able to sit EOB with Supervision. No LOB. Advised pt unable to start transfer training until knee scooter has arrived. Pt mentioned, pt has only been able to heel only WB on L foot for past 6 months. Pt will likely put more weight thru when using knee scooter. Especially when pushing off to propel. Nurse to call MD and provide WB restrictions to LLE as well. Mobility limited at this point due to above. Cont POC. Progression toward goals:  [x]      Improving appropriately and progressing toward goals  []      Improving slowly and progressing toward goals  []      Not making progress toward goals and plan of care will be adjusted     PLAN:  Patient continues to benefit from skilled intervention to address the above impairments. Continue treatment per established plan of care.   Discharge Recommendations:  Rehab or Home Health  Further Equipment Recommendations for Discharge:  N/A     SUBJECTIVE:   Patient stated  I'm not sure if I will be able to go home    OBJECTIVE DATA SUMMARY:   Critical Behavior:  Neurologic State: Alert, Appropriate for age  Orientation Level: Oriented X4  Cognition: Appropriate decision making, Appropriate for age attention/concentration, Appropriate safety awareness, Follows commands  Safety/Judgement: Awareness of environment  Functional Mobility Training:  Bed Mobility:  Rolling: Supervision  Supine to Sit: Supervision  Scooting: Supervision  Balance:  Sitting: Intact  Right Side Weight Bearing: Non-weight bearing    Activity Tolerance:   Fair   After treatment:   [] Patient left in no apparent distress sitting up in chair  [x] Patient left in no apparent distress in bed  [x] Call bell left within reach  [] Nursing notified  [] Caregiver present  [] Bed alarm activated      Cameron Infante PTA   Time Calculation: 30 mins

## 2018-03-26 LAB
ANION GAP SERPL CALC-SCNC: 6 MMOL/L (ref 3–18)
BACTERIA SPEC CULT: ABNORMAL
BUN SERPL-MCNC: 14 MG/DL (ref 7–18)
BUN/CREAT SERPL: 15 (ref 12–20)
CALCIUM SERPL-MCNC: 8.1 MG/DL (ref 8.5–10.1)
CHLORIDE SERPL-SCNC: 109 MMOL/L (ref 100–108)
CO2 SERPL-SCNC: 28 MMOL/L (ref 21–32)
CREAT SERPL-MCNC: 0.95 MG/DL (ref 0.6–1.3)
GLUCOSE BLD STRIP.AUTO-MCNC: 176 MG/DL (ref 70–110)
GLUCOSE BLD STRIP.AUTO-MCNC: 203 MG/DL (ref 70–110)
GLUCOSE BLD STRIP.AUTO-MCNC: 247 MG/DL (ref 70–110)
GLUCOSE BLD STRIP.AUTO-MCNC: 280 MG/DL (ref 70–110)
GLUCOSE BLD STRIP.AUTO-MCNC: 329 MG/DL (ref 70–110)
GLUCOSE SERPL-MCNC: 185 MG/DL (ref 74–99)
POTASSIUM SERPL-SCNC: 4 MMOL/L (ref 3.5–5.5)
SERVICE CMNT-IMP: ABNORMAL
SODIUM SERPL-SCNC: 143 MMOL/L (ref 136–145)

## 2018-03-26 PROCEDURE — 74011636637 HC RX REV CODE- 636/637: Performed by: HOSPITALIST

## 2018-03-26 PROCEDURE — 74011636637 HC RX REV CODE- 636/637: Performed by: PHYSICIAN ASSISTANT

## 2018-03-26 PROCEDURE — 74011250637 HC RX REV CODE- 250/637: Performed by: HOSPITALIST

## 2018-03-26 PROCEDURE — 74011250637 HC RX REV CODE- 250/637: Performed by: PHYSICIAN ASSISTANT

## 2018-03-26 PROCEDURE — 65270000029 HC RM PRIVATE

## 2018-03-26 PROCEDURE — 74011250636 HC RX REV CODE- 250/636: Performed by: HOSPITALIST

## 2018-03-26 PROCEDURE — 80048 BASIC METABOLIC PNL TOTAL CA: CPT | Performed by: HOSPITALIST

## 2018-03-26 PROCEDURE — 74011250637 HC RX REV CODE- 250/637: Performed by: INTERNAL MEDICINE

## 2018-03-26 PROCEDURE — 36415 COLL VENOUS BLD VENIPUNCTURE: CPT | Performed by: HOSPITALIST

## 2018-03-26 PROCEDURE — 74011250636 HC RX REV CODE- 250/636: Performed by: PODIATRIST

## 2018-03-26 PROCEDURE — 97110 THERAPEUTIC EXERCISES: CPT

## 2018-03-26 PROCEDURE — 97530 THERAPEUTIC ACTIVITIES: CPT

## 2018-03-26 PROCEDURE — 82962 GLUCOSE BLOOD TEST: CPT

## 2018-03-26 RX ORDER — INSULIN LISPRO 100 [IU]/ML
15 INJECTION, SOLUTION INTRAVENOUS; SUBCUTANEOUS
Status: DISCONTINUED | OUTPATIENT
Start: 2018-03-26 | End: 2018-04-01

## 2018-03-26 RX ORDER — INSULIN LISPRO 100 [IU]/ML
13 INJECTION, SOLUTION INTRAVENOUS; SUBCUTANEOUS
Status: DISCONTINUED | OUTPATIENT
Start: 2018-03-26 | End: 2018-03-26

## 2018-03-26 RX ADMIN — SODIUM CHLORIDE, SODIUM LACTATE, POTASSIUM CHLORIDE, AND CALCIUM CHLORIDE 125 ML/HR: 600; 310; 30; 20 INJECTION, SOLUTION INTRAVENOUS at 05:18

## 2018-03-26 RX ADMIN — INSULIN LISPRO 6 UNITS: 100 INJECTION, SOLUTION INTRAVENOUS; SUBCUTANEOUS at 23:28

## 2018-03-26 RX ADMIN — VANCOMYCIN HYDROCHLORIDE 1750 MG: 10 INJECTION, POWDER, LYOPHILIZED, FOR SOLUTION INTRAVENOUS at 09:07

## 2018-03-26 RX ADMIN — ENOXAPARIN SODIUM 40 MG: 40 INJECTION SUBCUTANEOUS at 03:07

## 2018-03-26 RX ADMIN — POLYETHYLENE GLYCOL 3350 17 G: 17 POWDER, FOR SOLUTION ORAL at 09:01

## 2018-03-26 RX ADMIN — INSULIN LISPRO 15 UNITS: 100 INJECTION, SOLUTION INTRAVENOUS; SUBCUTANEOUS at 17:50

## 2018-03-26 RX ADMIN — SODIUM CHLORIDE, SODIUM LACTATE, POTASSIUM CHLORIDE, AND CALCIUM CHLORIDE 125 ML/HR: 600; 310; 30; 20 INJECTION, SOLUTION INTRAVENOUS at 23:39

## 2018-03-26 RX ADMIN — INSULIN LISPRO 10 UNITS: 100 INJECTION, SOLUTION INTRAVENOUS; SUBCUTANEOUS at 11:59

## 2018-03-26 RX ADMIN — VANCOMYCIN HYDROCHLORIDE 1750 MG: 10 INJECTION, POWDER, LYOPHILIZED, FOR SOLUTION INTRAVENOUS at 23:27

## 2018-03-26 RX ADMIN — ASPIRIN 81 MG 81 MG: 81 TABLET ORAL at 08:52

## 2018-03-26 RX ADMIN — INSULIN LISPRO 6 UNITS: 100 INJECTION, SOLUTION INTRAVENOUS; SUBCUTANEOUS at 17:50

## 2018-03-26 RX ADMIN — GUAIFENESIN 100 MG: 200 SOLUTION ORAL at 23:36

## 2018-03-26 RX ADMIN — INSULIN LISPRO 10 UNITS: 100 INJECTION, SOLUTION INTRAVENOUS; SUBCUTANEOUS at 08:54

## 2018-03-26 RX ADMIN — GUAIFENESIN 100 MG: 200 SOLUTION ORAL at 09:07

## 2018-03-26 RX ADMIN — INSULIN GLARGINE 30 UNITS: 100 INJECTION, SOLUTION SUBCUTANEOUS at 08:53

## 2018-03-26 RX ADMIN — INSULIN LISPRO 3 UNITS: 100 INJECTION, SOLUTION INTRAVENOUS; SUBCUTANEOUS at 08:54

## 2018-03-26 NOTE — PROGRESS NOTES
Problem: Mobility Impaired (Adult and Pediatric)  Goal: *Acute Goals and Plan of Care (Insert Text)  In 1-7 days pt will be able to perform:  ST.  Bed mobility:  Rolling L to R to L modified independent for positioning. 2.  Supine to sit to supine S with HR for meals. 3.  Sit to stand to sit S with knee scooter in prep for ambulation. LT.  Gait:  Ambulate >100ft S with knee scooter NWB, for home/community mobility. 2.  Stair Negotiation:  Ascend/descend >3 steps CGA with HR NWB on R for home entry. 3.  Patient/Family Education:  Patient/family to be independent with HEP for follow-up care and safe discharge. Outcome: Progressing Towards Goal  physical Therapy TREATMENT    Patient: Trevin Cifuentes (68 y.o. male)  Date: 3/26/2018  Diagnosis: Diabetic foot infection ([address]  infected right foot Diabetic foot infection ([address]  Procedure(s) (LRB):  RIGHT FOOT INCISION/DRAINAGE, BONE BIOPSY, AND RESECTION OF INFECTED BONE RIGHT FOOT (Right) 3 Days Post-Op  Precautions: Fall   Chart, physical therapy assessment, plan of care and goals were reviewed. ASSESSMENT:  Pt with edema of R LE upon entry into room as pt keeping LEs in a dependent position. Educated pt on the importance of HEP and elevation to reduce edema. Pt nervous about getting a knee scooter as he stated his surgeon indicated that he is heel WB only on the left and NWB on the right. Educated pt that a R Sarika Dae 23 would be more appropriate at this time and he would need a ramp built or medical transport to get into the home. Left pt in bed for lunch with with all needs met and call bell within reach. Pt able to perform tricep press-ups to scoot himself up and down the mattress thus should be able to use a sliding board to get into and out of a WC with a drop arm as pt stated he does not want to stand up for transfers at this time given his WB restrictions.    Progression toward goals:  []      Improving appropriately and progressing toward goals  [x] Improving slowly and progressing toward goals  []      Not making progress toward goals and plan of care will be adjusted     PLAN:  Patient continues to benefit from skilled intervention to address the above impairments. Continue treatment per established plan of care. Discharge Recommendations:  Rehab vs. Home Health  Further Equipment Recommendations for Discharge:  wheelchair     SUBJECTIVE:   Patient stated I just don't know how I'm going to do this all at home.     OBJECTIVE DATA SUMMARY:   Critical Behavior:  Neurologic State: Alert, Appropriate for age  Orientation Level: Appropriate for age, Oriented X4  Cognition: Appropriate decision making, Appropriate for age attention/concentration, Appropriate safety awareness, Follows commands  Safety/Judgement: Awareness of environment, Fall prevention, Good awareness of safety precautions, Insight into deficits  Functional Mobility Training:  Bed Mobility:  Rolling: Supervision  Supine to Sit: Supervision  Sit to Supine: Supervision;Contact guard assistance;Modified independent  Scooting: Supervision   Balance:  Sitting: Intact  Ambulation/Gait Training:   Right Side Weight Bearing: Non-weight bearing   Interventions: Safety awareness training   Therapeutic Exercises:   HEP included ankle pumps, LAQ, seated marching x 10 reps  Pain:  Pain Scale 1: Numeric (0 - 10)  Pain Intensity 1: 0   Activity Tolerance:   Fair+  Please refer to the flowsheet for vital signs taken during this treatment.   After treatment:   [] Patient left in no apparent distress sitting up in chair  [x] Patient left in no apparent distress in bed  [x] Call bell left within reach  [x] Nursing notified  [] Caregiver present  [] Bed alarm activated      Chasity Cardoso PT, DPT     Time Calculation: 28 mins

## 2018-03-26 NOTE — PROGRESS NOTES
Problem: Falls - Risk of  Goal: *Absence of Falls  Document Lise Fall Risk and appropriate interventions in the flowsheet. Outcome: Progressing Towards Goal  Fall Risk Interventions:  Mobility Interventions: Utilize walker, cane, or other assitive device, Patient to call before getting OOB     Medication Interventions: Patient to call before getting OOB    Elimination Interventions: Call light in reach, Patient to call for help with toileting needs, Toileting schedule/hourly rounds    History of Falls Interventions:  Investigate reason for fall

## 2018-03-26 NOTE — PROGRESS NOTES
1930 bedside report from day shift RN, patient laying in bed watching television. 2015 Physical assessment A&Ox4, lung sounds clear bilaterally. Patient sitting on side of bed.     0305 reassessment, no changes to previous physical assessment. Patient wishes to be left alone at this time. States that he would like to leave the hospital today. Patient appeared agitated that multiple people entered his room throughout the night (vital signs, labs, medication). 0730 bedside report given to oncoming RN, patient resting in bed.

## 2018-03-26 NOTE — DIABETES MGMT
GLYCEMIC CONTROL PROGRESS NOTE:    -discussed in rounds, known h/o T2DM, HbA1C not within recommended range for age + comorbids, on mixed insulin BID home regimen  -BG out of target range non-ICU: < 140 mg/dL, requiring consistent corrective coverage  -TDD = 87 (30 Lantus + 30 mealtime + 27 - Humalog Very Insulin Resistant Corrective Coverage)  -PPG continue to be out of target range, recommend increase mealtime insulin (orders entered per protocol)   *Humalog 15 units [facility]    Recent Glucose Results:   Lab Results   Component Value Date/Time     (H) 03/26/2018 02:54 AM    GLUCPOC 280 (H) 03/26/2018 12:29 PM    GLUCPOC 176 (H) 03/26/2018 07:26 AM    GLUCPOC 266 (H) 03/25/2018 04:03 PM         Johanna Mosquera RN, MS  Glycemic Control Team  Pager [phone] (M-TH 8:30-5P)  *After Hours pager [phone]

## 2018-03-26 NOTE — PROGRESS NOTES
Met with pt and he continues to refuse [facility] services. CM awaiting ID consult to assist with identifying IVABX need. Please note pt will have to have [facility] for home infusion unless it is daily, then pt can go to [facility].   CM continuing to follow

## 2018-03-26 NOTE — DIABETES MGMT
Diabetes Patient/Family Education Record  -pt reports a h/o DM since early 25s  Factors That  May Influence Patients Ability  to Learn or  Comply with Recommendations   []   Language barrier    []   Cultural needs   []   Motivation    []   Cognitive limitation    []   Physical   []   Education    []   Physiological factors   []   Hearing/vision/speaking impairment   []   Judaism beliefs    []   Financial factors   []  Other:   [x]  No factors identified at this time.      Person Instructed:   [x]   Patient   []   Family   []  Other     Preference for Learning:   [x]   Verbal   []   Written   []  Demonstration     Level of Comprehension & Competence:    []  Good                                      [x] Fair                                     []  Poor                             []  Needs Reinforcement   [x]  Teachback completed    Education Component:   [x]  Medication management, including confirmation of home regimen; pt reports the following doses: 50 units in AM; 40 units @ lunch; 10 units @ dinner; pt often skips dinner dose r/t hypoglycemia; encouraged pt to follow up with OP PCP for alternative regimen with less risk of hypos    [x]  Nutritional management   []  Exercise   []  Signs, symptoms, and treatment of hyperglycemia and hypoglycemia   [] Prevention, recognition and treatment of hyperglycemia and hypoglycemia   [x]  Importance of blood glucose monitoring     []  Instruction on use of the blood glucose meter   [x]  Discuss the importance of HbA1C monitoring    []  Sick day guidelines   []  Proper use and disposal of lancets, needles, syringes or insulin pens (if appropriate)   [x]  Potential short-term complications; impact of GC on wound healing   [] Information about whom to contact in case of emergency or for more information    [x]  Goal:  Patient/family will demonstrate understanding of Diabetes Self Management Skills by: 4/30/18  Plan for post-discharge education or self-management support:    [x] Outpatient class schedule provided            [] Patient Declined    [] Scheduled for outpatient classes (date) _______     Emery Sahu RN MS  Glycemic Control Team (M-TH 8:30-5PM)  Pager [phone])  *After Hours [facility] [phone]

## 2018-03-26 NOTE — ROUTINE PROCESS
1935 Bedside, Verbal and Written shift change report given to Angela Thomason RN (oncoming nurse) by Dorita Fleischer, RN (offgoing nurse). Report included the following information SBAR, Kardex, Intake/Output, MAR, Recent Results and Med Rec Status.

## 2018-03-26 NOTE — ROUTINE PROCESS
Bedside, Verbal and Written shift change report given to Michael Solomon RN (oncoming nurse) by Stef Daniel RN (offgoing nurse). Report included the following information SBAR, Kardex, Intake/Output, MAR, Recent Results and Med Rec Status.

## 2018-03-26 NOTE — DIABETES MGMT
NUTRITION / GLYCEMIC CONTROL PLAN OF CARE        Diabetes Management:      - recommend:  Increase mealtime dose to Humalog 15 units qac, pt consistently requiring corrective coverage at mealtimes, will re-evaluate basal dose once new mealtime dose on board (orders entered per protocol, Dr. David Tatum)  - education: per [facility] SUJATA COLVIN RN, see additional notes  - goals:    - BG will be in target range of  mg/dl (non-ICU) by 3/30/18   - pt will maintain current wt/prevent wt gain by 5/01/18   - pt will follow up with OP PCP for DM by 5/01/18    - TDD: 87 units (30 Lantus, 57 Humalog - 30 mealtime, 27 corrective)  - 36 hr BG range: 143-280 mg/dl  - Hypo: none  - BG in target range (non-ICU: <140; ICU<180): [] Yes  [x] No  - Steroids: none  - Intake: excellent   Patient Vitals for the past 100 hrs:   % Diet Eaten   03/25/18 1527 100 %   03/25/18 0904 100 %   03/24/18 0846 95 %   03/23/18 2212 100 %   03/22/18 1256 100 %        Current Insulin regimen:   - Lantus 30 units every day  - Humalog 10 units qac  - Humalog Normal Insulin Sensitivity Corrective Coverage    Home medication/insulin regimen:  - Humalog 75/25 insulin 50 units BID    Recent Glucose Results: Lab Results   Component Value Date/Time     (H) 03/26/2018 02:54 AM    GLUCPOC 280 (H) 03/26/2018 12:29 PM    GLUCPOC 176 (H) 03/26/2018 07:26 AM    GLUCPOC 266 (H) 03/25/2018 04:03 PM             Adequate glycemic control PTA:  [] Yes  [x] No    HbA1c: equivalent  to ave BGlucose of  278 mg/dl for 2-3 months prior to admission    Lab Results   Component Value Date/Time    Hemoglobin A1c 11.3 (H) 03/22/2018 11:02 AM       Diet:   Active Orders   Diet    DIET DIABETIC CONSISTENT CARB Regular           MBridgett Roman, MPH, RD, CDE  Glycemic Control Team  Pager [phone] (M-F [phone])

## 2018-03-27 LAB
ANION GAP SERPL CALC-SCNC: 9 MMOL/L (ref 3–18)
BACTERIA SPEC CULT: ABNORMAL
BACTERIA SPEC CULT: NORMAL
BACTERIA SPEC CULT: NORMAL
BUN SERPL-MCNC: 9 MG/DL (ref 7–18)
BUN/CREAT SERPL: 11 (ref 12–20)
CALCIUM SERPL-MCNC: 8 MG/DL (ref 8.5–10.1)
CHLORIDE SERPL-SCNC: 110 MMOL/L (ref 100–108)
CO2 SERPL-SCNC: 26 MMOL/L (ref 21–32)
CREAT SERPL-MCNC: 0.82 MG/DL (ref 0.6–1.3)
DATE LAST DOSE: NORMAL
GLUCOSE BLD STRIP.AUTO-MCNC: 131 MG/DL (ref 70–110)
GLUCOSE BLD STRIP.AUTO-MCNC: 153 MG/DL (ref 70–110)
GLUCOSE BLD STRIP.AUTO-MCNC: 163 MG/DL (ref 70–110)
GLUCOSE BLD STRIP.AUTO-MCNC: 249 MG/DL (ref 70–110)
GLUCOSE BLD STRIP.AUTO-MCNC: 253 MG/DL (ref 70–110)
GLUCOSE SERPL-MCNC: 136 MG/DL (ref 74–99)
GRAM STN SPEC: ABNORMAL
GRAM STN SPEC: ABNORMAL
POTASSIUM SERPL-SCNC: 3.7 MMOL/L (ref 3.5–5.5)
REPORTED DOSE,DOSE: NORMAL UNITS
REPORTED DOSE/TIME,TMG: 1000
SERVICE CMNT-IMP: ABNORMAL
SERVICE CMNT-IMP: ABNORMAL
SERVICE CMNT-IMP: NORMAL
SERVICE CMNT-IMP: NORMAL
SODIUM SERPL-SCNC: 145 MMOL/L (ref 136–145)
VANCOMYCIN TROUGH SERPL-MCNC: 15.8 UG/ML (ref 10–20)

## 2018-03-27 PROCEDURE — 74011250637 HC RX REV CODE- 250/637: Performed by: HOSPITALIST

## 2018-03-27 PROCEDURE — 80048 BASIC METABOLIC PNL TOTAL CA: CPT | Performed by: HOSPITALIST

## 2018-03-27 PROCEDURE — 36415 COLL VENOUS BLD VENIPUNCTURE: CPT | Performed by: HOSPITALIST

## 2018-03-27 PROCEDURE — 74011250637 HC RX REV CODE- 250/637: Performed by: INTERNAL MEDICINE

## 2018-03-27 PROCEDURE — 74011636637 HC RX REV CODE- 636/637: Performed by: HOSPITALIST

## 2018-03-27 PROCEDURE — 82962 GLUCOSE BLOOD TEST: CPT

## 2018-03-27 PROCEDURE — 74011250636 HC RX REV CODE- 250/636: Performed by: HOSPITALIST

## 2018-03-27 PROCEDURE — 80202 ASSAY OF VANCOMYCIN: CPT | Performed by: HOSPITALIST

## 2018-03-27 PROCEDURE — 74011636637 HC RX REV CODE- 636/637: Performed by: PHYSICIAN ASSISTANT

## 2018-03-27 PROCEDURE — 74011250637 HC RX REV CODE- 250/637: Performed by: PHYSICIAN ASSISTANT

## 2018-03-27 PROCEDURE — 65270000029 HC RM PRIVATE

## 2018-03-27 RX ADMIN — ENOXAPARIN SODIUM 40 MG: 40 INJECTION SUBCUTANEOUS at 06:17

## 2018-03-27 RX ADMIN — INSULIN LISPRO 6 UNITS: 100 INJECTION, SOLUTION INTRAVENOUS; SUBCUTANEOUS at 10:21

## 2018-03-27 RX ADMIN — INSULIN LISPRO 3 UNITS: 100 INJECTION, SOLUTION INTRAVENOUS; SUBCUTANEOUS at 21:44

## 2018-03-27 RX ADMIN — INSULIN LISPRO 15 UNITS: 100 INJECTION, SOLUTION INTRAVENOUS; SUBCUTANEOUS at 10:21

## 2018-03-27 RX ADMIN — ASPIRIN 81 MG 81 MG: 81 TABLET ORAL at 10:20

## 2018-03-27 RX ADMIN — INSULIN LISPRO 9 UNITS: 100 INJECTION, SOLUTION INTRAVENOUS; SUBCUTANEOUS at 13:02

## 2018-03-27 RX ADMIN — INSULIN GLARGINE 30 UNITS: 100 INJECTION, SOLUTION SUBCUTANEOUS at 10:21

## 2018-03-27 RX ADMIN — INSULIN LISPRO 3 UNITS: 100 INJECTION, SOLUTION INTRAVENOUS; SUBCUTANEOUS at 17:28

## 2018-03-27 RX ADMIN — INSULIN LISPRO 15 UNITS: 100 INJECTION, SOLUTION INTRAVENOUS; SUBCUTANEOUS at 13:01

## 2018-03-27 RX ADMIN — VANCOMYCIN HYDROCHLORIDE 1750 MG: 10 INJECTION, POWDER, LYOPHILIZED, FOR SOLUTION INTRAVENOUS at 22:52

## 2018-03-27 RX ADMIN — GUAIFENESIN 100 MG: 200 SOLUTION ORAL at 21:48

## 2018-03-27 RX ADMIN — VANCOMYCIN HYDROCHLORIDE 1750 MG: 10 INJECTION, POWDER, LYOPHILIZED, FOR SOLUTION INTRAVENOUS at 10:28

## 2018-03-27 RX ADMIN — POLYETHYLENE GLYCOL 3350 17 G: 17 POWDER, FOR SOLUTION ORAL at 10:21

## 2018-03-27 RX ADMIN — INSULIN LISPRO 15 UNITS: 100 INJECTION, SOLUTION INTRAVENOUS; SUBCUTANEOUS at 17:28

## 2018-03-27 NOTE — PROGRESS NOTES
Hospitalist Progress Note    Patient: Curtis Mena MRN: [id number]  CSN: [id number]    YOB: 1945  Age: 71 y.o. Sex: male    DOA: 3/21/2018 LOS:  LOS: 6 days                Assessment/Plan     Patient Active Problem List   Diagnosis Code    Diabetic foot infection ([facility] E11.69, L08.9    HTN (hypertension) I10    Diabetic neuropathy ([facility] E11.40    Diabetic foot ulcer ([facility] E11.621, L97.509    Diabetes ([facility] E11.9    CAD (coronary artery disease) I25.10    Cellulitis L03.90    Osteomyelitis of right foot ([facility] M86.9            72 yo male admitted for right foot cellulitis, foot ulcer    Cellulitis/osteomyelitis/diabetic foot infection, Right - failed outpatient antibiotics. on vancomycin,   Blood cultures with no growth  s/p I&D right foot and resection of infected bone 03/23/2018. Wound cultures with polymicrobial growth. Will consult ID.     DM - continue home insulin and start on SSI.     HTN - monitor BP     CAD - continue with aspirin     DVT prophylaxis    Per Dr. Kalyani Ricks, he recommended 6 weeks of antibiotics. Recommend consulting ID for choice of antibiotics. Discussed with patient about long term antibiotics, PICC. He does not want PICC and would like to think about it. Disposition : 2-3 days    Review of systems  General: No fevers or chills. Cardiovascular: No chest pain or pressure. No palpitations. Pulmonary: No shortness of breath. Gastrointestinal: No nausea, vomiting. Physical Exam:  General: Awake, cooperative, no acute distress    HEENT: NC, Atraumatic. PERRLA, anicteric sclerae. Lungs: CTA Bilaterally. No Wheezing/Rhonchi/Rales. Heart:  Regular  rhythm,  No murmur, No Rubs, No Gallops  Abdomen: Soft, Non distended, Non tender.  +Bowel sounds,   Extremities: Right foot with dressing. diabetic foot ulcer right heel and bilateral MTP joint. Open wound right mid foot plantar aspect, with drainage. Psych:   Not anxious or agitated.   Neurologic:  No acute neurological deficit. Vital signs/Intake and Output:  Visit Vitals    /40 (BP 1 Location: Right arm, BP Patient Position: At rest)    Pulse 77    Temp 98.5 °F (36.9 °C)    Resp 18    Ht 6' 1\" (1.854 m)    Wt 124.8 kg (275 lb 3.2 oz)    SpO2 97%    BMI 36.31 kg/m2     Current Shift:  03/27 0701 - 03/27 1900  In: 480 [P.O.:480]  Out: 1550 [Urine:1550]  Last three shifts:  03/25 1901 - 03/27 0700  In: -   Out: 1850 [Urine:1850]            Labs: Results:       Chemistry Recent Labs      03/27/18   0635  03/26/18   0254  03/25/18   0525   GLU  136*  185*  143*   NA  145  143  142   K  3.7  4.0  3.6   CL  110*  109*  109*   CO2  26  28  26   BUN  9  14  13   CREA  0.82  0.95  0.79   CA  8.0*  8.1*  8.5   AGAP  9  6  7   BUCR  11*  15  16      CBC w/Diff Recent Labs      03/25/18   0525   WBC  6.1   RBC  3.39*   HGB  10.2*   HCT  30.9*   PLT  176      Cardiac Enzymes No results for input(s): CPK, CKND1, NELLA in the last 72 hours. No lab exists for component: CKRMB, TROIP   Coagulation No results for input(s): PTP, INR, APTT in the last 72 hours. No lab exists for component: INREXT, INREXT    Lipid Panel No results found for: CHOL, CHOLPOCT, CHOLX, CHLST, CHOLV, 127879, HDL, LDL, LDLC, DLDLP, 484033, VLDLC, VLDL, TGLX, TRIGL, TRIGP, TGLPOCT, CHHD, CHHDX   BNP No results for input(s): BNPP in the last 72 hours. Liver Enzymes No results for input(s): TP, ALB, TBIL, AP, SGOT, GPT in the last 72 hours.     No lab exists for component: DBIL   Thyroid Studies No results found for: T4, T3U, TSH, TSHEXT, TSHEXT     Procedures/imaging: see electronic medical records for all procedures/Xrays and details which were not copied into this note but were reviewed prior to creation of Plan

## 2018-03-27 NOTE — PROGRESS NOTES
Problem: Mobility Impaired (Adult and Pediatric)  Goal: *Acute Goals and Plan of Care (Insert Text)  In 1-7 days pt will be able to perform:  ST.  Bed mobility:  Rolling L to R to L modified independent for positioning. 2.  Supine to sit to supine S with HR for meals. 3.  Sit to stand to sit S with knee scooter in prep for ambulation. LT.  Gait:  Ambulate >100ft S with knee scooter NWB, for home/community mobility. 2.  Stair Negotiation:  Ascend/descend >3 steps CGA with HR NWB on R for home entry. 3.  Patient/Family Education:  Patient/family to be independent with HEP for follow-up care and safe discharge. Pt refused PT due to:  []  Nausea/vomiting  [x]  Eating  []  Pain  []  Pt lethargic  []  Off Unit  Will f/u later as schedule allows. Thank you.   Julio César Marquez, PTA

## 2018-03-27 NOTE — PROGRESS NOTES
Hospitalist Progress Note    Patient: Ovidio Johnson MRN: [id number]  CSN: [id number]    YOB: 1945  Age: 71 y.o. Sex: male    DOA: 3/21/2018 LOS:  LOS: 4 days                Assessment/Plan     Patient Active Problem List   Diagnosis Code    Diabetic foot infection ([facility] E11.69, L08.9    HTN (hypertension) I10    Diabetic neuropathy ([facility] E11.40    Diabetic foot ulcer ([facility] E11.621, L97.509    Diabetes ([facility] E11.9    CAD (coronary artery disease) I25.10    Cellulitis L03.90    Osteomyelitis of right foot ([facility] M86.9            70 yo male admitted for right foot cellulitis, foot ulcer    Cellulitis/osteomyelitis/diabetic foot infection, Right - failed outpatient antibiotics. on vancomycin,   Blood cultures with no growth  wound cultures with polymicrobial growth. Keep right leg elevated. s/p I&D right foot and resection of infected bone 03/23/2018. Follow intra op cultures. Discussed with Dr. Terrance Cuevas. Will consult ID.     DM - continue home insulin and start on SSI.     HTN - monitor BP     CAD - continue with aspirin     DVT prophylaxis    Disposition : 2-3 days    Review of systems  General: No fevers or chills. Cardiovascular: No chest pain or pressure. No palpitations. Pulmonary: No shortness of breath. Gastrointestinal: No nausea, vomiting. Physical Exam:  General: Awake, cooperative, no acute distress    HEENT: NC, Atraumatic. PERRLA, anicteric sclerae. Lungs: CTA Bilaterally. No Wheezing/Rhonchi/Rales. Heart:  Regular  rhythm,  No murmur, No Rubs, No Gallops  Abdomen: Soft, Non distended, Non tender.  +Bowel sounds,   Extremities: Right foot with dressing. diabetic foot ulcer right heel and bilateral MTP joint. Open wound right mid foot plantar aspect, with drainage. Psych:   Not anxious or agitated. Neurologic:  No acute neurological deficit. Vital signs/Intake and Output:  Visit Vitals    /56 (BP 1 Location: Left arm, BP Patient Position:  At rest)    Pulse 77    Temp 98.4 °F (36.9 °C)    Resp 18    Ht 6' 1\" (1.854 m)    Wt 122.6 kg (270 lb 3.2 oz)    SpO2 100%    BMI 35.65 kg/m2     Current Shift:     Last three shifts:  03/25 0701 - 03/26 1900  In: 780 [P.O.:780]  Out: 2150 [Urine:2150]            Labs: Results:       Chemistry Recent Labs      03/26/18   0254  03/25/18   0525  03/24/18   0440   GLU  185*  143*  226*   NA  143  142  141   K  4.0  3.6  4.1   CL  109*  109*  106   CO2  28  26  27   BUN  14  13  8   CREA  0.95  0.79  0.75   CA  8.1*  8.5  8.3*   AGAP  6  7  8   BUCR  15  16  11*      CBC w/Diff Recent Labs      03/25/18 0525 03/24/18   0440   WBC  6.1  7.1   RBC  3.39*  3.58*   HGB  10.2*  10.8*   HCT  30.9*  32.8*   PLT  176  196      Cardiac Enzymes No results for input(s): CPK, CKND1, NELLA in the last 72 hours. No lab exists for component: CKRMB, TROIP   Coagulation No results for input(s): PTP, INR, APTT in the last 72 hours. No lab exists for component: INREXT, INREXT    Lipid Panel No results found for: CHOL, CHOLPOCT, CHOLX, CHLST, CHOLV, 473993, HDL, LDL, LDLC, DLDLP, 732245, VLDLC, VLDL, TGLX, TRIGL, TRIGP, TGLPOCT, CHHD, CHHDX   BNP No results for input(s): BNPP in the last 72 hours. Liver Enzymes No results for input(s): TP, ALB, TBIL, AP, SGOT, GPT in the last 72 hours.     No lab exists for component: DBIL   Thyroid Studies No results found for: T4, T3U, TSH, TSHEXT, TSHEXT     Procedures/imaging: see electronic medical records for all procedures/Xrays and details which were not copied into this note but were reviewed prior to creation of Plan

## 2018-03-27 NOTE — ROUTINE PROCESS
1900-Bedside shift change report given to Collene Bumpers, RN (oncoming nurse) by Raffy Mesa (offgoing nurse). Report included the following information SBAR, Kardex and MAR.             0700-Bedside shift change report given to [facility], Rn (oncoming nurse) by Collene Bumpers, RN (offgoing nurse). Report included the following information SBAR, Kardex and MAR. Patient alert and oriented, bed in lowest position and wheels locked. No signs of distress noted.

## 2018-03-27 NOTE — PROGRESS NOTES
Problem: Mobility Impaired (Adult and Pediatric)  Goal: *Acute Goals and Plan of Care (Insert Text)  In 1-7 days pt will be able to perform:  ST.  Bed mobility:  Rolling L to R to L modified independent for positioning. 2.  Supine to sit to supine S with HR for meals. 3.  Sit to stand to sit S with knee scooter in prep for ambulation. LT.  Gait:  Ambulate >100ft S with knee scooter NWB, for home/community mobility. 2.  Stair Negotiation:  Ascend/descend >3 steps CGA with HR NWB on R for home entry. 3.  Patient/Family Education:  Patient/family to be independent with HEP for follow-up care and safe discharge. Pt refused PT due to:  []  Nausea/vomiting  []  Eating  []  Pain  []  Pt lethargic  [x]  Pt upset and \"I just need some time\"   Will f/u later as schedule allows. Thank you.   Monika Miller, PTA

## 2018-03-27 NOTE — PROGRESS NOTES
Met with pt and his wife, Kelly Yanez ([phone]), at bedside to discuss plan of care options to include [facility], [facility] and LTACH given pt will need IVABX. Pt's wife has requested information be sent to [facility]. CMS has been notified to assist.  Pt and his wife will further discuss plan of care given pt has been refusing HH. CM to continue to follow. Awaiting IVABX determination to assist with identifying an appropriate plan of care.

## 2018-03-27 NOTE — DIABETES MGMT
GLYCEMIC CONTROL PROGRESS NOTE:    -known h/o T2DM, HbA1C not within recommended range r/t non compliance with mixed insulin home regimen  -BG out of target range non-ICU: < 140 mg/dL, requiring consistent corrective coverage  -TDD = (30 Lantus + 35 mealtime + 15 - Humalog Very Insulin Resistant Corrective Coverage)  -FBG improving, PPG out of target, Humalog increased to 15 units qac yesterday, recommend monitor    Recent Glucose Results:   Lab Results   Component Value Date/Time     (H) 03/27/2018 06:35 AM    GLUCPOC 253 (H) 03/27/2018 12:20 PM    GLUCPOC 249 (H) 03/27/2018 10:13 AM    GLUCPOC 131 (H) 03/27/2018 07:20 AM       Bryson Pham RN, MS  Glycemic Control Team  Pager [phone] (M-TH 8:30-5P)  *After Hours pager [phone]

## 2018-03-28 ENCOUNTER — APPOINTMENT (OUTPATIENT)
Dept: MRI IMAGING | Age: 73
DRG: 629 | End: 2018-03-28
Attending: PODIATRIST
Payer: MEDICARE

## 2018-03-28 LAB
ANION GAP SERPL CALC-SCNC: 5 MMOL/L (ref 3–18)
BACTERIA SPEC CULT: ABNORMAL
BUN SERPL-MCNC: 10 MG/DL (ref 7–18)
BUN/CREAT SERPL: 11 (ref 12–20)
CALCIUM SERPL-MCNC: 8.4 MG/DL (ref 8.5–10.1)
CHLORIDE SERPL-SCNC: 111 MMOL/L (ref 100–108)
CO2 SERPL-SCNC: 30 MMOL/L (ref 21–32)
CREAT SERPL-MCNC: 0.94 MG/DL (ref 0.6–1.3)
GLUCOSE BLD STRIP.AUTO-MCNC: 158 MG/DL (ref 70–110)
GLUCOSE BLD STRIP.AUTO-MCNC: 175 MG/DL (ref 70–110)
GLUCOSE BLD STRIP.AUTO-MCNC: 214 MG/DL (ref 70–110)
GLUCOSE BLD STRIP.AUTO-MCNC: 290 MG/DL (ref 70–110)
GLUCOSE SERPL-MCNC: 113 MG/DL (ref 74–99)
GRAM STN SPEC: ABNORMAL
GRAM STN SPEC: ABNORMAL
POTASSIUM SERPL-SCNC: 4.2 MMOL/L (ref 3.5–5.5)
SERVICE CMNT-IMP: ABNORMAL
SODIUM SERPL-SCNC: 146 MMOL/L (ref 136–145)

## 2018-03-28 PROCEDURE — 65270000029 HC RM PRIVATE

## 2018-03-28 PROCEDURE — 74011000250 HC RX REV CODE- 250: Performed by: PODIATRIST

## 2018-03-28 PROCEDURE — 74011636637 HC RX REV CODE- 636/637: Performed by: PHYSICIAN ASSISTANT

## 2018-03-28 PROCEDURE — 74011636637 HC RX REV CODE- 636/637: Performed by: HOSPITALIST

## 2018-03-28 PROCEDURE — 74011250637 HC RX REV CODE- 250/637: Performed by: HOSPITALIST

## 2018-03-28 PROCEDURE — 36415 COLL VENOUS BLD VENIPUNCTURE: CPT | Performed by: HOSPITALIST

## 2018-03-28 PROCEDURE — 74011250637 HC RX REV CODE- 250/637: Performed by: PHYSICIAN ASSISTANT

## 2018-03-28 PROCEDURE — 74011000258 HC RX REV CODE- 258: Performed by: HOSPITALIST

## 2018-03-28 PROCEDURE — 73718 MRI LOWER EXTREMITY W/O DYE: CPT

## 2018-03-28 PROCEDURE — 82962 GLUCOSE BLOOD TEST: CPT

## 2018-03-28 PROCEDURE — 74011250637 HC RX REV CODE- 250/637: Performed by: INTERNAL MEDICINE

## 2018-03-28 PROCEDURE — 80048 BASIC METABOLIC PNL TOTAL CA: CPT | Performed by: HOSPITALIST

## 2018-03-28 PROCEDURE — 74011250636 HC RX REV CODE- 250/636: Performed by: HOSPITALIST

## 2018-03-28 RX ORDER — VANCOMYCIN 1.75 GRAM/500 ML IN 0.9 % SODIUM CHLORIDE INTRAVENOUS
1750 EVERY 12 HOURS
Status: DISCONTINUED | OUTPATIENT
Start: 2018-03-29 | End: 2018-04-05 | Stop reason: HOSPADM

## 2018-03-28 RX ADMIN — PIPERACILLIN SODIUM,TAZOBACTAM SODIUM 3.38 G: 3; .375 INJECTION, POWDER, FOR SOLUTION INTRAVENOUS at 13:28

## 2018-03-28 RX ADMIN — INSULIN LISPRO 15 UNITS: 100 INJECTION, SOLUTION INTRAVENOUS; SUBCUTANEOUS at 17:00

## 2018-03-28 RX ADMIN — INSULIN LISPRO 15 UNITS: 100 INJECTION, SOLUTION INTRAVENOUS; SUBCUTANEOUS at 13:28

## 2018-03-28 RX ADMIN — INSULIN LISPRO 9 UNITS: 100 INJECTION, SOLUTION INTRAVENOUS; SUBCUTANEOUS at 13:29

## 2018-03-28 RX ADMIN — ASPIRIN 81 MG 81 MG: 81 TABLET ORAL at 08:18

## 2018-03-28 RX ADMIN — POLYETHYLENE GLYCOL 3350 17 G: 17 POWDER, FOR SOLUTION ORAL at 08:18

## 2018-03-28 RX ADMIN — PIPERACILLIN SODIUM,TAZOBACTAM SODIUM 3.38 G: 3; .375 INJECTION, POWDER, FOR SOLUTION INTRAVENOUS at 18:42

## 2018-03-28 RX ADMIN — GUAIFENESIN 100 MG: 200 SOLUTION ORAL at 02:49

## 2018-03-28 RX ADMIN — INSULIN LISPRO 9 UNITS: 100 INJECTION, SOLUTION INTRAVENOUS; SUBCUTANEOUS at 16:30

## 2018-03-28 RX ADMIN — SODIUM HYPOCHLORITE: 1.25 SOLUTION TOPICAL at 17:47

## 2018-03-28 RX ADMIN — INSULIN GLARGINE 30 UNITS: 100 INJECTION, SOLUTION SUBCUTANEOUS at 08:18

## 2018-03-28 RX ADMIN — INSULIN LISPRO 3 UNITS: 100 INJECTION, SOLUTION INTRAVENOUS; SUBCUTANEOUS at 08:19

## 2018-03-28 RX ADMIN — INSULIN LISPRO 15 UNITS: 100 INJECTION, SOLUTION INTRAVENOUS; SUBCUTANEOUS at 08:19

## 2018-03-28 RX ADMIN — ENOXAPARIN SODIUM 40 MG: 40 INJECTION SUBCUTANEOUS at 02:45

## 2018-03-28 RX ADMIN — INSULIN LISPRO 6 UNITS: 100 INJECTION, SOLUTION INTRAVENOUS; SUBCUTANEOUS at 22:00

## 2018-03-28 RX ADMIN — VANCOMYCIN HYDROCHLORIDE 1750 MG: 10 INJECTION, POWDER, LYOPHILIZED, FOR SOLUTION INTRAVENOUS at 13:29

## 2018-03-28 NOTE — PROGRESS NOTES
Chart reviewed. Pt has been declined by [facility]. Please advise if patient will have PICC. Please advise which IV abx (and for how long) patient will be on at discharge. Will need hard script. Will also need wound care orders at discharge. Discharge disposition: TBD.      1200  Attended IDRs. Pt off floor for procedure. Per MD David Tatum, pt will be on zosyn q4, and vanco (dosed by pharmacy) for 6 weeks. Edwardo Wilkerson will get cost out through [employer]. It is noted pt will need PICC. Please advise. 1300  Met with patient at bedside. Informed him [facility] had not accepted and pt stated \"good, I didn't want to go there. \"  Informed him, per MD David Tatum, he would not be on once daily IV abx therefore, [facility] would not be an option. Discussed home health and pt is adamantly refusing home health citing problems with multiple agencies in past (names he could not recall). Pt offered [facility] for home health. Per [facility], pt is covered 100%. Pt given rehab list and offerd foc. Pt stating he does not want to go to rehab either (displeased with [facility] and [facility]). Pt requesting to have IV abx changed to once daily so he can go to [facility]. Will discuss with MD David Tatum. 1410  Received call from [address] at [employer] ([phone] option 1) and she verbalized pt would be covered at 100% with medications.

## 2018-03-28 NOTE — PROGRESS NOTES
Shift summary: Pt A&Ox4, up with assistance, NWB RLE. Pt reports pain- states it is tolerable. Explained to patient that he has pain medication available should his pain become intolerable.

## 2018-03-28 NOTE — PROGRESS NOTES
Attended interdisciplinary rounds on this patient. Notified Dr. Harden Child that vancomycin duration of tx set to  tonight. Took V/O to continue as well as add zosyn 3.375gm q6h.     [address] [facility] Pharmacist  [phone]

## 2018-03-28 NOTE — PROGRESS NOTES
conducted an initial consultation and Spiritual Assessment for Solomon Pratt, who is a 71 y.o.,male. Patients Primary Language is: Sagrarioandrea Fabiola. According to the patients EMR Anglican Affiliation is: Gnosticist.     The reason the Patient came to the hospital is:   Patient Active Problem List    Diagnosis Date Noted    Osteomyelitis of right foot ([facility] [address] 03/23/2018    Cellulitis 03/22/2018    HTN (hypertension)     Diabetic neuropathy ([facility] [address]     Diabetic foot ulcer ([facility] [address]     Diabetes ([facility] [address]     CAD (coronary artery disease)     Diabetic foot infection ([facility] [address] 03/21/2018        The  provided the following Interventions:  Initiated a relationship of care and support. Explored issues of ashely, belief, spirituality and Latter day/ritual needs while hospitalized. Listened empathically. Provided chaplaincy education. Provided information about Spiritual Care Services. Offered prayer and assurance of continued prayers on patients behalf. Chart reviewed. The following outcomes were achieved:  Patient shared limited information about both their medical narrative and spiritual journey/beliefs. Patient processed feeling about current hospitalization. Patient expressed gratitude for pastoral care visit. Assessment:  Patient does not have any Latter day/cultural needs that will affect patients preferences in health care. Patient requires daily visits. .    Plan:  Chaplains will continue to follow and will provide pastoral care on an as needed/requested basis.  recommends bedside caregivers page  on duty if patient shows signs of acute spiritual or emotional distress.       Sister Chandrika Mata, [place], [address]  [phone]

## 2018-03-28 NOTE — ROUTINE PROCESS
Bedside and Verbal shift change report given to JARED Amaya RN (oncoming nurse) by NAN Richardson RN (offgoing nurse). Report included the following information SBAR, Kardex, Intake/Output and MAR.

## 2018-03-28 NOTE — CONSULTS
Infectious Disease Consult Note    Reason for Consult: R foot osteomyelitis   Date of Consultation: March 28, 2018  Date of Admission: 3/21/2018  Referring Physician: [address] [place] [address] requested by Dr Aranza Stover regarding antibitoics for R foot osteomyelitis     Chart reviewed and discussed wt Mr Stanislav Champagne. Also discussed regarding operative findings with Dr Bhavana Contreras today       HPI:       Mr Stanislav Champagne is a 71year old gentleman with history of DM (A1C 11.3), CAD, HTN, who was admitted on 3/21/18 with concerns of non healing R diabetic foot ulcer and cellulitis. He reports that he has had an ulcer on his L foot and R foot for a long time per him. He says that they intermittently heal and open up. He reports that he has diabetic neuropathy and has intermittent \"neuropathy pain\". He reports that he does not have good sensation of his feet. He says that in past he has had glass injure him on R foot. About 3 weeks ago, coming out of a restaurant, he says that a door hit his R foot on the side. He says he did not feel it much at that time. Later while doing dressing changes, reports he saw a pocket of pus from his foot. Says his foot on R was red. He has been on Amoxicillin for a week prior to admission. Denied fever, chills, nausea, vomiting diarrhea. Says about 4 years ago , he had a PICC line for L foot infection and his arm was swollen and he has issues with PICC at that time. From chart review, he has had blood cultures done on 3/21/18 that have been negative. Would cultures obtained (dated and labelled wound drainage ) on 3/22 + for Coag negative Staph, Diptheroids, Bacillus ( broth). Would cultures from 3/23 of foot with Pseudomonas S auricularis ( Methicillin resistant) , Peptostreptococcus. Cultures labelled \"Bone\" from 3/23 Coag negative Staph ( Methicillin resistant), Beta Strep and Moraxella ( Broth)     Operative findings discussed wt Dr Bhavana Contreras today.   Per discussion, its not clear that pathology margins were done. In terms of allergies, Mr Kelle Edwards denied any rash/dypsnea or swelling when he took Bactrim/IKeflex in past.  He has had bad GI upset with PO antibiotics and not tolerated PO abx well. Past Medical History:  Past Medical History:   Diagnosis Date    Asthma     CAD (coronary artery disease)     [facility] [address]     Diabetic foot ulcer [facility] [address]     Diabetic neuropathy ([facility] [address]     HTN (hypertension)          Surgical History:  Past Surgical History:   Procedure Laterality Date    CARDIAC SURG PROCEDURE UNLIST  1998    bypass    HX ORTHOPAEDIC           Family History:   History reviewed. No pertinent family history. Social History:     · Living Situation: lives with wife   · Tobacco:denied   · Alcohol:denied   · Illicit Drugs:denied d  · Sexual History:    · Travel History  · Exposures: Has a dog but no other exposures     Allergies: Allergies   Allergen Reactions    Doxycycline Anaphylaxis    Bactrim [Sulfamethoprim Ds] Diarrhea    Keflex [Cephalexin] Diarrhea         Review of Systems:     Gen: Negative for chills, fevers, weight loss, weight gain   HEENT: Negative for headache, vision changes, ear ache or discharge, tingling,  nasal discharge, swelling, lumps in neck, sores on tongue   CV:  Negative for chest pain, dyspnea on exertion, leg edema   Lungs: Negative for shortness of breath, cough, wheezing   Abdomen: Negative for abdominal pain, nausea, vomiting, diarrhea, constipation   Genitourinary: Negative for genital pain or genital discharge     Neuro: Negative for headache, numbness, tingling, extremity weakness,  syncope, seizures    Skin: Negative for rash, sores/open wounds   Musculoskeletal: + R foot redness, ulcer, discharge    Endocrine: Negative for high or low blood sugars, heat or cold intolerance    Psych: Negative for manic behavior     Medications:  No current facility-administered medications on file prior to encounter. Current Outpatient Prescriptions on File Prior to Encounter   Medication Sig Dispense Refill    aspirin 81 mg chewable tablet Take 81 mg by mouth daily.  insulin lispro protamine/insulin lispro (HUMALOG MIX 75-25) 100 unit/mL (75-25) injection 50 Units by SubCUTAneous route two (2) times a day.  guaiFENesin (ROBITUSSIN) 100 mg/5 mL liquid Take 50 mg by mouth three (3) times daily as needed for Cough.              Physical Exam:    Vitals: Patient Vitals for the past 24 hrs:   Temp Pulse Resp BP SpO2   03/28/18 0817 98.3 °F (36.8 °C) 70 18 128/54 98 %   03/28/18 0341 97.6 °F (36.4 °C) 79 18 131/60 91 %   03/27/18 2311 98.1 °F (36.7 °C) 67 16 128/57 98 %   03/27/18 1952 98.4 °F (36.9 °C) 81 16 146/43 98 %   03/27/18 1526 98.5 °F (36.9 °C) 77 18 105/40 97 %   ·   Limited Exam :  NAD  No rash on face   Attempted to visualize R foot via telemedicine camera  + erythema noted on R foot  L foot dressing   No discharge or odor on R foot per discussion with RN         Labs:   Recent Results (from the past 24 hour(s))   GLUCOSE, POC    Collection Time: 03/27/18 10:13 AM   Result Value Ref Range    Glucose (POC) 249 (H) 70 - 110 mg/dL   GLUCOSE, POC    Collection Time: 03/27/18 12:20 PM   Result Value Ref Range    Glucose (POC) 253 (H) 70 - 110 mg/dL   GLUCOSE, POC    Collection Time: 03/27/18  4:36 PM   Result Value Ref Range    Glucose (POC) 153 (H) 70 - 110 mg/dL   GLUCOSE, POC    Collection Time: 03/27/18  9:29 PM   Result Value Ref Range    Glucose (POC) 163 (H) 70 - 110 mg/dL   VANCOMYCIN, TROUGH    Collection Time: 03/27/18  9:55 PM   Result Value Ref Range    Vancomycin,trough 15.8 10.0 - 20.0 ug/mL    Reported dose date: 20180327      Reported dose time: 1000      Reported dose: 1750 MG UNITS   METABOLIC PANEL, BASIC    Collection Time: 03/28/18  3:58 AM   Result Value Ref Range    Sodium 146 (H) 136 - 145 mmol/L    Potassium 4.2 3.5 - 5.5 mmol/L    Chloride 111 (H) 100 - 108 mmol/L    CO2 30 21 - 32 mmol/L    Anion gap 5 3.0 - 18 mmol/L    Glucose 113 (H) 74 - 99 mg/dL    BUN 10 7.0 - 18 MG/DL    Creatinine 0.94 0.6 - 1.3 MG/DL    BUN/Creatinine ratio 11 (L) 12 - 20      GFR est AA >60 >60 ml/min/1.73m2    GFR est non-AA >60 >60 ml/min/1.73m2    Calcium 8.4 (L) 8.5 - 10.1 MG/DL   GLUCOSE, POC    Collection Time: 03/28/18  8:17 AM   Result Value Ref Range    Glucose (POC) 158 (H) 70 - 110 mg/dL       Microbiology Data:       Blood: 3/21/18 negative      Wound 3/22/18  Special Requests: NO SPECIAL REQUESTS   Final      GRAM STAIN NO WBC'S SEEN   Final     GRAM STAIN NO ORGANISMS SEEN   Final     Culture result: MODERATE DIPHTHEROIDS (A)   Final     Culture result:    Final     FEW STAPHYLOCOCCUS SPECIES, COAGULASE NEGATIVE (A)     Culture result:    Final     STREPTOCOCCI, BETA HEMOLYTIC GROUP B ISOLATED FROM BROTH ONLY (A)     Culture result:    Final     BACILLUS SPECIES, NOT ANTHRACIS ISOLATED FROM BROTH ONLY (A)       Result History      Wound 3/23/18  Special Requests: NO SPECIAL REQUESTS   Preliminary      GRAM STAIN NO ORGANISMS SEEN   Preliminary     GRAM STAIN NO WBC'S SEEN   Preliminary     Culture result:    Preliminary     RARE STREPTOCOCCI, BETA HEMOLYTIC GROUP B (A)     Culture result:    Preliminary     RARE STAPHYLOCOCCUS AUREUS (A)     Culture result:    Preliminary     RARE STAPHYLOCOCCUS HOMINIS SENSITIVITY TO FOLLOW (A)     Culture result:    Preliminary     BACILLUS SPECIES, NOT ANTHRACIS ISOLATED FROM BROTH ONLY (A)       Culture & Susceptibility        Antibiotic   Organism Organism Organism       Staphylococcus aureus       AMPICILLIN/SULBACTAM ($)   DEDUCED SENSITIVE   ug/mL   S Preliminary         CEFAZOLIN ($)   DEDUCED SENSITIVE   ug/mL   S Preliminary         CLINDAMYCIN ($)   <=0.25   ug/mL   S Preliminary         ERYTHROMYCIN ($$$$)   >=8   ug/mL   R Preliminary         GENTAMICIN ($)   <=0.5   ug/mL   S Preliminary         LEVOFLOXACIN ($)   <=0.12   ug/mL   S Preliminary         OXACILLIN   <=0.25   ug/mL   S Preliminary         PENICILLIN G ($$)   <=0.03   ug/mL   R Preliminary         RIFAMPIN ($$$$)   <=0.5   ug/mL   S Preliminary         TETRACYCLINE   <=1   ug/mL   S Preliminary         TIGECYCLINE ($$$$)   <=0.12   ug/mL   S Preliminary         TRIMETH-SULFAMETHOXA   <=10   ug/mL   S Preliminary         VANCOMYCIN ($)   <=0.5   ug/mL   S Preliminary                        Component Results      Component Value Ref Range & Units Status     Special Requests: NO SPECIAL REQUESTS   Final     Culture result:    Final     FEW PEPTOSTREPTOCOCCUS RAMÓN (A)       Result History      Wound foot 3/23/18  Component Value Ref Range & Units Status      Special Requests: NO SPECIAL REQUESTS   Final     GRAM STAIN NO ORGANISMS SEEN   Final     GRAM STAIN NO WBC'S SEEN   Final     Culture result:    Final     PSEUDOMONAS AERUGINOSA ISOLATED FROM BROTH ONLY (A)     Culture result: RARE DIPHTHEROIDS (A)   Final     Culture result:    Final     RARE STAPHYLOCOCCUS AURICULARIS (A)       Culture & Susceptibility        Antibiotic   Organism Organism Organism       Pseudomonas aeruginosa Staphylococcus auricularis      CEFEPIME ($$)   <=1   ug/mL   S Final         CEFTAZIDIME ($)   4   ug/mL   S Final         CEFTRIAXONE ($)   >=64   ug/mL   R Final         CIPROFLOXACIN ($)   <=0.25   ug/mL   S Final         CLINDAMYCIN ($)     <=0.25   ug/mL   S Final       ERYTHROMYCIN ($$$$)     0.5   ug/mL   S Final       GENTAMICIN ($)   <=1   ug/mL   S Final  8   ug/mL   I Final       IMIPENEM   2   ug/mL   S Final         LEVOFLOXACIN ($)   0.5   ug/mL   S Final  >=8   ug/mL   R Final       OXACILLIN     0.5   ug/mL   R Final       PENICILLIN G ($$)     0.12   ug/mL   R Final       RIFAMPIN ($$$$)     <=0.5   ug/mL   S Final       TETRACYCLINE     <=1   ug/mL   S Final       TIGECYCLINE ($$$$)     <=0.12   ug/mL   S Final       TOBRAMYCIN ($)   <=1   ug/mL   S Final         TRIMETH-SULFAMETHOXA   160   ug/mL   R Final  <=10   ug/mL   S Final       VANCOMYCIN ($)     <=0.5   ug/mL   S Final                              Component Results      Component Value Ref Range & Units Status     Special Requests: NO SPECIAL REQUESTS   Final     Culture result:    Final     FEW PEPTOSTREPTOCOCCUS RAMÓN (A)       Result History      CULTURE, ANAEROBIC on 3/27/2018     Cultures labeled \" 3/23/18   Component Value Ref Range & Units Status     Special Requests: NO SPECIAL REQUESTS   Final     GRAM STAIN RARE WBC'S   Final     GRAM STAIN NO ORGANISMS SEEN   Final     Culture result:    Final     FEW STAPHYLOCOCCUS SPECIES, COAGULASE NEGATIVE (A)     Culture result:    Final     RARE STREPTOCOCCI, BETA HEMOLYTIC GROUP B (A)     Culture result:    Final     MORAXELLA OSLOENSIS ISOLATED FROM BROTH ONLY (A)       Culture & Susceptibility        Antibiotic   Organism Organism Organism       Staphylococcus species coagulase negative       AMPICILLIN/SULBACTAM ($)  ug/mL   R          CEFAZOLIN ($)  ug/mL   R          CLINDAMYCIN ($)   <=0.25   ug/mL   S Final         ERYTHROMYCIN ($$$$)   0.5   ug/mL   S Final         GENTAMICIN ($)   8   ug/mL   I Final         LEVOFLOXACIN ($)   >=8   ug/mL   R Final         OXACILLIN   0.5   ug/mL   R Final         PENICILLIN G ($$)   0.12   ug/mL   R Final         RIFAMPIN ($$$$)   <=0.5   ug/mL   S Final         TETRACYCLINE   <=1   ug/mL   S Final         TIGECYCLINE ($$$$)   <=0.12   ug/mL   S Final         TRIMETH-SULFAMETHOXA   <=10   ug/mL   S Final         VANCOMYCIN ($)   1   ug/mL   S Final                                 Component Value Ref Range & Units Status     Special Requests: NO SPECIAL REQUESTS   Preliminary     Culture result:    Preliminary     CULTURE IN PROGRESS,FURTHER UPDATES TO FOLLOW                  Pathology Results:    Bone biopsy 2/10/14 L foot  BONE BIOPSY LEFT FOOT:  ARTICULAR CARTILAGE AND BONE, NO PATHOLOGIC FINDINGS.     Imaging:   MRI R foot 3/21/18  FINDINGS:     There is a soft tissue ulcer involving the plantar medial aspect of the right  forefoot, adjacent to the first MTP joint. In continuity with this skin  ulceration, T1 marrow hypointensity is noted involving the distal aspect of the  first metatarsal shaft, sesamoid, as well as the base of the great toe proximal  phalanx. Osseous resorption of the first metatarsal head is noted, in keeping  with radiographic findings. On postcontrast imaging, there is abnormal  intramedullary enhancement of the distal aspect of the first metatarsal, base of  the great toe proximal phalanx, as well as bilateral sesamoids, and keeping with  underlying osteomyelitis. There is no evidence of necrotic or nonenhancing bone  demonstrated. Small amount of fluid is noted at the residual first MTP joint  space with postcontrast enhancement, in keeping with septic arthritis. Skin  thickening and enhancement is noted involving the great toe soft tissues,  without associated organized or drainable fluid collection.     Within the limitations of motion, the tarsometatarsal alignment appears within  normal limits. The tarsometatarsal joint spaces appear normal. MTP articulations  of the second-fourth toes as well as the interphalangeal joints of the lesser  toes are within normal limits.     With the exception of the flexor tendons in the region of the plantar plate of  the great toe, remaining flexor and extensor tendons appear within normal  limits. .     There is diffuse intrinsic muscular atrophy, with similarly diffuse abnormal  intramuscular edema signal. Slightly more focal atrophy is noted involving the  abductor digit minimi muscle belly. No abnormal intramuscular enhancement.     Dorsal midfoot and forefoot edema is present.     IMPRESSION  IMPRESSION:  1. Medial right forefoot soft tissue ulcer with associated osteomyelitis of the  distal first metatarsal, sesamoids, and great toe proximal phalanx. No evidence  of necrotic or nonenhancing bone. Small amount of fluid at the residual joint  space is noted to demonstrate mild enhancement, in keeping with associated  septic arthritis of the first MTP joint. 2. Skin thickening and enhancement involving the great toe, greatest medially in  keeping with associated cellulitis. 3. No organized or drainable fluid collection. 4. Diffusely abnormal intrinsic muscle bulk and signal in keeping with sequela  of subacute denervation. Procedures:   3/23/18 PROCEDURES PERFORMED:    1. Incision and drainage, right foot. 2.  Resection of infected bone (part of the first metatarsal and the first proximal phalanx in part were resected) and secondary closure of the heel ulceration after debridement   3. Biopsy of infected right foot first metatarsal    Assessment / Plan:     Mr Alfonso Fox is a 71year old gentleman with history of DM (A1C 11.3), CAD, HTN, who was admitted on 3/21/18 with concerns of non healing R diabetic foot ulcer and cellulitis. He reports that he has had an ulcer on his L foot and R foot for a long time per him. He says that they intermittently heal and open up. He reports that he has diabetic neuropathy and has intermittent \"neuropathy pain\". He reports that he does not have good sensation of his feet. He says that in past he has had glass injure him on R foot. About 3 weeks ago, coming out of a restaurant, he says that a door hit his R foot on the side. He says he did not feel it much at that time. Later while doing dressing changes, reports he saw a pocket of pus from his foot. Says his foot on R was red. He has been on Amoxicillin for a week prior to admission. Denied fever, chills, nausea, vomiting diarrhea. Says about 4 years ago , he had a PICC line for L foot infection and his arm was swollen and he has issues with PICC at that time. From chart review, he has had blood cultures done on 3/21/18 that have been negative.   Would cultures obtained (dated and labelled wound drainage ) on 3/22 + for Coag negative Staph, Diptheroids, Bacillus ( broth). Would cultures from 3/23 of foot with Pseudomonas S auricularis ( Methicillin resistant) , Peptostreptococcus. Cultures labelled \"Bone\" from 3/23 Coag negative Staph ( Methicillin resistant), Beta Strep and Moraxella ( Broth) , Anaerobic cultures pending    I do not see any pathology results from 3/23/18's surgery. D/W Dr Campoverde Delay and unclear if pathology margins of resection done. In terms of allergies, Mr Crys Godwin denied any rash/dypsnea or swelling when he took Bactrim/IKeflex in past.  He has had bad GI upset with PO antibiotics and not tolerated PO abx well. 1) R foot osteomyelitis, Diabetic foot ulcer, cellulitis   On the limited exam being able to done today via telemedicine, there appears to be some erythema over R foot/cellulitis   If there are no pathology margins that were sent and negative for osteomyelitis, then would recommend 6 weeks of IV antibiotics (  3/23-5/4/18 )  If all the infected bone was resected and pathology margins were negative for osteomyelitis, would not recommend long term IV antibiotics but would need antibiotics to cover cellulitis and possibly soft tissue infection as well   Recommend  IV Vancomycin dosed by pharmacy renally with trough goal of 15-20,    Recommend Zosyn IV dosed renally (3.375 Q 4 hours)  to also cover Pseudomonas as it was + on tissue culture. Zosyn has the advantage to be given as a continuous infusion pump for ease of administration that pharmacy can assist with on discharge planning  However, renal function needs to be monitored very closely with the above 2 antibiotics,  as of late, we have noted additive effects of these two antibiotics on renal function.   If renal function worsens, my need to change antibiotics accordingly   Side effects of antibiotics including renal, GI, bone marrow suppression  and risk for CDI discussed and need to be monitored for closely  While on high dose antibiotics, suggest weekly labs for CBC wt diff, CMP, ESR , CRP and Vancomycin levels   Needs ID and Podiatry follow up   Suggest ID outpatient follow up within 1-2 weeks of discharge for close monitoring given adverse effects in past with antibiotics   Patient reports issues with PICC line and will defer to primary team to assess this and to see if safe PICC candidate  PICC removal once IV antibiotics completed if placed for IV antibiotics  From my discussion with Dr Dviya Parker today, it is not clear that pathology margins were sent/negative for osteomyelitis. If this chagnes, and pathology margins return negative for osteomyelitis, please re consult ID for recommendation again   Off pressure loading, wound care per podiatry   Diabetes optimization to help in wound healing and infection control   Daily yogurt/probiotics encouraged   PLEASE HAVE MICROBIOLOGY LAB THERE WORKUP THE ORGANISMS FOR SUSCEPTIBILITY OF BONE AND WOUND CULTURES ( I called and discussed with micro staff there today. This is a send out lab and not done at THE [facility])   PRIMARY TEAM TO COORDINATE OUTPATIENT ID FOLLOW UP AS WELL PLEASE. 2) DM: A1C 11.3  Per primary team     3) CAD    4) HTN    5) DVT Px per primary team     Discussed in detail with Mr Rosy Fox, his nurse , Dr Anastasiia Lui , Dr Divya Parker today     Telemedicine ID will sign off. Thank for the opportunity to participate in the care of this patient. Please contact with questions or concerns.

## 2018-03-28 NOTE — ADVANCED PRACTICE NURSE
Patient seen in bathroom without assistance from staff washing his hands, Primary RN Izabella Gonzalez and Charge RN Les Daniel notified and went in room with patient. Physical therapy on unit and notified that patient is not adhering to weight bearing status per MD recommendations. Physical therapy team members Fercho Knight and Daniela Fields went in to work with patient and he refused to work with team members at this time. Dr. Rob Mccann pagealiya and primary RN requested orders for PICC line and psych consult as he has been teary eyed & crying earlier this shift verbalizing that \"nobody cares about me\" and \"I am a burden to my family\".  pagealiya and came to see patient. Michael Payan, MSN, APRN, [facility]  Clinical Nurse Specialist, Surgical/Oncology

## 2018-03-28 NOTE — PROGRESS NOTES
MRI called for patient  Transportation went to get and patient was being seen by teleconsult doctor. Put on hold.

## 2018-03-28 NOTE — PROGRESS NOTES
VANCOMYCIN trough = 15.8 @ 2155 03/27/2018 within therapeutic range  continue with same dosing and pharmacy shall continue to monitor and adjust according to clinical status and outcomes

## 2018-03-28 NOTE — PROGRESS NOTES
[facility] and verbally told to change order to rt foot without contrast

## 2018-03-28 NOTE — PROGRESS NOTES
Hospitalist Progress Note    Patient: Monserrat Neville MRN: [id number]  CSN: [id number]    YOB: 1945  Age: 71 y.o. Sex: male    DOA: 3/21/2018 LOS:  LOS: 7 days                Assessment/Plan     Patient Active Problem List   Diagnosis Code    Diabetic foot infection ([facility] E11.69, L08.9    HTN (hypertension) I10    Diabetic neuropathy ([facility] E11.40    Diabetic foot ulcer ([facility] E11.621, L97.509    Diabetes ([facility] E11.9    CAD (coronary artery disease) I25.10    Cellulitis L03.90    Osteomyelitis of right foot ([facility] M86.9            70 yo male admitted for right foot cellulitis, foot ulcer    Cellulitis/osteomyelitis/diabetic foot infection, Right - failed outpatient antibiotics. on vancomycin,   Blood cultures with no growth  s/p I&D right foot and resection of infected bone 03/23/2018. Wound cultures with polymicrobial growth. Seen by ID  Antibiotics - vancomycin and zosyn per ID recommendations. Unclear if margins are clear.     DM - continue home insulin and SSI.     HTN - monitor BP     CAD - continue with aspirin     DVT prophylaxis        Disposition : 2-3 days    Review of systems  General: No fevers or chills. Cardiovascular: No chest pain or pressure. No palpitations. Pulmonary: No shortness of breath. Gastrointestinal: No nausea, vomiting. Physical Exam:  General: Awake, cooperative, no acute distress    HEENT: NC, Atraumatic. PERRLA, anicteric sclerae. Lungs: CTA Bilaterally. No Wheezing/Rhonchi/Rales. Heart:  Regular  rhythm,  No murmur, No Rubs, No Gallops  Abdomen: Soft, Non distended, Non tender.  +Bowel sounds,   Extremities: Right foot with dressing. diabetic foot ulcer right heel and bilateral MTP joint. Open wound right mid foot plantar aspect, with drainage. Psych:   Not anxious or agitated. Neurologic:  No acute neurological deficit.            Vital signs/Intake and Output:  Visit Vitals    /48 (BP 1 Location: Right arm, BP Patient Position: Sitting)    Pulse 76    Temp 98.2 °F (36.8 °C)    Resp 18    Ht 6' 1\" (1.854 m)    Wt 124.8 kg (275 lb 3.2 oz)    SpO2 96%    BMI 36.31 kg/m2     Current Shift:  03/28 0701 - 03/28 1900  In: 840 [P.O.:240; I.V.:600]  Out: 1400 [Urine:1400]  Last three shifts:  03/26 1901 - 03/28 0700  In: 480 [P.O.:480]  Out: 3800 [Urine:3800]            Labs: Results:       Chemistry Recent Labs      03/28/18   0358  03/27/18   0635  03/26/18   0254   GLU  113*  136*  185*   NA  146*  145  143   K  4.2  3.7  4.0   CL  111*  110*  109*   CO2  30  26  28   BUN  10  9  14   CREA  0.94  0.82  0.95   CA  8.4*  8.0*  8.1*   AGAP  5  9  6   BUCR  11*  11*  15      CBC w/Diff No results for input(s): WBC, RBC, HGB, HCT, PLT, GRANS, LYMPH, EOS, HGBEXT, HCTEXT, PLTEXT, HGBEXT, HCTEXT, PLTEXT in the last 72 hours. Cardiac Enzymes No results for input(s): CPK, CKND1, NELLA in the last 72 hours. No lab exists for component: CKRMB, TROIP   Coagulation No results for input(s): PTP, INR, APTT in the last 72 hours. No lab exists for component: INREXT, INREXT    Lipid Panel No results found for: CHOL, CHOLPOCT, CHOLX, CHLST, CHOLV, 057342, HDL, LDL, LDLC, DLDLP, 930199, VLDLC, VLDL, TGLX, TRIGL, TRIGP, TGLPOCT, CHHD, CHHDX   BNP No results for input(s): BNPP in the last 72 hours. Liver Enzymes No results for input(s): TP, ALB, TBIL, AP, SGOT, GPT in the last 72 hours.     No lab exists for component: DBIL   Thyroid Studies No results found for: T4, T3U, TSH, TSHEXT, TSHEXT     Procedures/imaging: see electronic medical records for all procedures/Xrays and details which were not copied into this note but were reviewed prior to creation of Plan

## 2018-03-28 NOTE — PROGRESS NOTES
Problem: Mobility Impaired (Adult and Pediatric)  Goal: *Acute Goals and Plan of Care (Insert Text)  In 1-7 days pt will be able to perform:  ST.  Bed mobility:  Rolling L to R to L modified independent for positioning. 2.  Supine to sit to supine S with HR for meals. 3.  Sit to stand to sit S with knee scooter in prep for ambulation. LT.  Gait:  Ambulate >100ft S with knee scooter NWB, for home/community mobility. 2.  Stair Negotiation:  Ascend/descend >3 steps CGA with HR NWB on R for home entry. 3.  Patient/Family Education:  Patient/family to be independent with HEP for follow-up care and safe discharge. Pt refused PT due to:  []  Nausea/vomiting  []  Eating  [x]  Pain  []  Pt lethargic  [x]  \" I just got up and I cant do anything right now\" Edu pt not to get up without assistance. Call bell in reach. Will f/u later as schedule allows. Thank you.   Burton Ortiz, PTA

## 2018-03-28 NOTE — PROGRESS NOTES
Dressing changed at this time, patient tolerated well. Dr Jahaira Gorman came to see patient at this time, patient agrees to continue with long term antibiotics and PICC line insertion. 2105hrs Bedside, Verbal and Written shift change report given to lex araya RN (oncoming nurse) by Mack Min RN (offgoing nurse). Report included the following information SBAR, Kardex and Procedure Summary. All questions answered.   All questions answered

## 2018-03-28 NOTE — DIABETES MGMT
GLYCEMIC CONTROL PROGRESS NOTE:    -known h/o T2DM, HbA1C not within recommended range r/t non compliance with mixed insulin home regimen  -BG out of target range non-ICU: < 140 mg/dL, requiring consistent corrective coverage  -TDD = 96 (30 Lantus + 45 mealtime + 21 - Humalog Very Insulin Resistant Corrective Coverage)  -Both FBG & PPG improving, recommend monitor, pt may benefit from mealtime insulin adjustment    Recent Glucose Results:   Lab Results   Component Value Date/Time     (H) 03/28/2018 03:58 AM    GLUCPOC 175 (H) 03/28/2018 01:10 PM    GLUCPOC 158 (H) 03/28/2018 08:17 AM    GLUCPOC 163 (H) 03/27/2018 09:29 PM          Ramon Rivera RN, MS  Glycemic Control Team  Pager [phone] (M-TH 8:30-5P)  *After Hours pager [phone]

## 2018-03-29 LAB
ANION GAP SERPL CALC-SCNC: 8 MMOL/L (ref 3–18)
BACTERIA SPEC CULT: ABNORMAL
BUN SERPL-MCNC: 10 MG/DL (ref 7–18)
BUN/CREAT SERPL: 10 (ref 12–20)
CALCIUM SERPL-MCNC: 8.3 MG/DL (ref 8.5–10.1)
CHLORIDE SERPL-SCNC: 109 MMOL/L (ref 100–108)
CO2 SERPL-SCNC: 28 MMOL/L (ref 21–32)
CREAT SERPL-MCNC: 0.97 MG/DL (ref 0.6–1.3)
GLUCOSE BLD STRIP.AUTO-MCNC: 118 MG/DL (ref 70–110)
GLUCOSE BLD STRIP.AUTO-MCNC: 258 MG/DL (ref 70–110)
GLUCOSE BLD STRIP.AUTO-MCNC: 271 MG/DL (ref 70–110)
GLUCOSE BLD STRIP.AUTO-MCNC: 301 MG/DL (ref 70–110)
GLUCOSE SERPL-MCNC: 133 MG/DL (ref 74–99)
GRAM STN SPEC: ABNORMAL
GRAM STN SPEC: ABNORMAL
POTASSIUM SERPL-SCNC: 3.7 MMOL/L (ref 3.5–5.5)
SERVICE CMNT-IMP: ABNORMAL
SODIUM SERPL-SCNC: 145 MMOL/L (ref 136–145)

## 2018-03-29 PROCEDURE — 74011250636 HC RX REV CODE- 250/636: Performed by: HOSPITALIST

## 2018-03-29 PROCEDURE — 65270000029 HC RM PRIVATE

## 2018-03-29 PROCEDURE — 74011250637 HC RX REV CODE- 250/637: Performed by: PHYSICIAN ASSISTANT

## 2018-03-29 PROCEDURE — 97530 THERAPEUTIC ACTIVITIES: CPT

## 2018-03-29 PROCEDURE — 82962 GLUCOSE BLOOD TEST: CPT

## 2018-03-29 PROCEDURE — 36415 COLL VENOUS BLD VENIPUNCTURE: CPT | Performed by: HOSPITALIST

## 2018-03-29 PROCEDURE — 97110 THERAPEUTIC EXERCISES: CPT

## 2018-03-29 PROCEDURE — 80048 BASIC METABOLIC PNL TOTAL CA: CPT | Performed by: HOSPITALIST

## 2018-03-29 PROCEDURE — 74011636637 HC RX REV CODE- 636/637: Performed by: PHYSICIAN ASSISTANT

## 2018-03-29 PROCEDURE — 74011636637 HC RX REV CODE- 636/637: Performed by: HOSPITALIST

## 2018-03-29 PROCEDURE — 74011000258 HC RX REV CODE- 258: Performed by: HOSPITALIST

## 2018-03-29 PROCEDURE — 74011250637 HC RX REV CODE- 250/637: Performed by: HOSPITALIST

## 2018-03-29 PROCEDURE — 74011250637 HC RX REV CODE- 250/637: Performed by: INTERNAL MEDICINE

## 2018-03-29 RX ORDER — CHLORPHENIRAMINE MALEATE 4 MG
TABLET ORAL 2 TIMES DAILY
Status: DISCONTINUED | OUTPATIENT
Start: 2018-03-29 | End: 2018-04-05 | Stop reason: HOSPADM

## 2018-03-29 RX ADMIN — PIPERACILLIN SODIUM,TAZOBACTAM SODIUM 3.38 G: 3; .375 INJECTION, POWDER, FOR SOLUTION INTRAVENOUS at 15:56

## 2018-03-29 RX ADMIN — VANCOMYCIN HYDROCHLORIDE 1750 MG: 10 INJECTION, POWDER, LYOPHILIZED, FOR SOLUTION INTRAVENOUS at 01:53

## 2018-03-29 RX ADMIN — POLYETHYLENE GLYCOL 3350 17 G: 17 POWDER, FOR SOLUTION ORAL at 09:00

## 2018-03-29 RX ADMIN — ENOXAPARIN SODIUM 40 MG: 40 INJECTION SUBCUTANEOUS at 02:02

## 2018-03-29 RX ADMIN — PIPERACILLIN SODIUM,TAZOBACTAM SODIUM 3.38 G: 3; .375 INJECTION, POWDER, FOR SOLUTION INTRAVENOUS at 01:50

## 2018-03-29 RX ADMIN — GUAIFENESIN 100 MG: 200 SOLUTION ORAL at 15:54

## 2018-03-29 RX ADMIN — GUAIFENESIN 100 MG: 200 SOLUTION ORAL at 02:01

## 2018-03-29 RX ADMIN — ASPIRIN 81 MG 81 MG: 81 TABLET ORAL at 09:22

## 2018-03-29 RX ADMIN — INSULIN LISPRO 12 UNITS: 100 INJECTION, SOLUTION INTRAVENOUS; SUBCUTANEOUS at 23:38

## 2018-03-29 RX ADMIN — INSULIN LISPRO 15 UNITS: 100 INJECTION, SOLUTION INTRAVENOUS; SUBCUTANEOUS at 17:58

## 2018-03-29 RX ADMIN — VANCOMYCIN HYDROCHLORIDE 1750 MG: 10 INJECTION, POWDER, LYOPHILIZED, FOR SOLUTION INTRAVENOUS at 17:57

## 2018-03-29 RX ADMIN — INSULIN LISPRO 15 UNITS: 100 INJECTION, SOLUTION INTRAVENOUS; SUBCUTANEOUS at 09:23

## 2018-03-29 RX ADMIN — CLOTRIMAZOLE: 10 CREAM TOPICAL at 15:55

## 2018-03-29 RX ADMIN — INSULIN LISPRO 9 UNITS: 100 INJECTION, SOLUTION INTRAVENOUS; SUBCUTANEOUS at 12:40

## 2018-03-29 RX ADMIN — INSULIN LISPRO 9 UNITS: 100 INJECTION, SOLUTION INTRAVENOUS; SUBCUTANEOUS at 17:59

## 2018-03-29 RX ADMIN — PIPERACILLIN SODIUM,TAZOBACTAM SODIUM 3.38 G: 3; .375 INJECTION, POWDER, FOR SOLUTION INTRAVENOUS at 21:24

## 2018-03-29 RX ADMIN — PIPERACILLIN SODIUM,TAZOBACTAM SODIUM 3.38 G: 3; .375 INJECTION, POWDER, FOR SOLUTION INTRAVENOUS at 09:23

## 2018-03-29 RX ADMIN — INSULIN LISPRO 15 UNITS: 100 INJECTION, SOLUTION INTRAVENOUS; SUBCUTANEOUS at 12:39

## 2018-03-29 RX ADMIN — INSULIN GLARGINE 30 UNITS: 100 INJECTION, SOLUTION SUBCUTANEOUS at 09:22

## 2018-03-29 NOTE — PROGRESS NOTES
Problem: Mobility Impaired (Adult and Pediatric)  Goal: *Acute Goals and Plan of Care (Insert Text)  In 1-7 days pt will be able to perform:  ST.  Bed mobility:  Rolling L to R to L modified independent for positioning. 2.  Supine to sit to supine S with HR for meals. 3.  Sit to stand to sit S with knee scooter in prep for ambulation. LT.  Gait:  Ambulate >100ft S with knee scooter NWB, for home/community mobility. 2.  Stair Negotiation:  Ascend/descend >3 steps CGA with HR NWB on R for home entry. 3.  Patient/Family Education:  Patient/family to be independent with HEP for follow-up care and safe discharge. Outcome: Progressing Towards Goal  physical Therapy TREATMENT    Patient: Sierra Mcginnis (4 y.o. male)  Date: 3/29/2018  Diagnosis: Diabetic foot infection ([address]  infected right foot Diabetic foot infection ([address]  Procedure(s) (LRB):  RIGHT FOOT INCISION/DRAINAGE, BONE BIOPSY, AND RESECTION OF INFECTED BONE RIGHT FOOT (Right) 6 Days Post-Op  Precautions: Fall   Chart, physical therapy assessment, plan of care and goals were reviewed. ASSESSMENT:  Pt seems depressed and sad. the patient states, \" I wish someone would come here to blow my brains out\" Nursing aware. Pt performed arm bike x5'. Pt reporting R shoulder pain. Discussed POC, and possible discharge options. Sitting balance is G. Cont POC. Progression toward goals:  []      Improving appropriately and progressing toward goals  [x]      Improving slowly and progressing toward goals  []      Not making progress toward goals and plan of care will be adjusted     PLAN:  Patient continues to benefit from skilled intervention to address the above impairments. Continue treatment per established plan of care.   Discharge Recommendations:  Rehab vs home health   Further Equipment Recommendations for Discharge:      SUBJECTIVE:   Patient stated  When will my knee scooter arrive     OBJECTIVE DATA SUMMARY:   Critical Behavior:  Neurologic State: Alert, Appropriate for age  Orientation Level: Oriented X4  Cognition: Appropriate for age attention/concentration, Follows commands  Safety/Judgement: Awareness of environment, Fall prevention, Good awareness of safety precautions, Insight into deficits  Functional Mobility Training:  Balance:  Sitting: Intact  Right Side Weight Bearing: Non-weight bearing  Interventions: Safety awareness training    Pain:  Pain Scale 1: Visual  Pain Intensity 1: 0  Activity Tolerance:   Fair     After treatment:   [] Patient left in no apparent distress sitting up in chair  [x] Patient left in no apparent distress in bed  [x] Call bell left within reach  [] Nursing notified  [] Caregiver present  [] Bed alarm activated      Acacia Campos PTA   Time Calculation: 38 mins

## 2018-03-29 NOTE — PROGRESS NOTES
NUTRITION SCREENING    Recommendations: Continue w/ POC    RD ASSESSMENT/PLAN:     Diet:  Berto 1800 Height: 6' 1\" (185.4 cm)     Food Allergies: NKFA   Weight: 124 kg (273 lb 4.8 oz)    PO Intake:  Patient Vitals for the past 100 hrs:   % Diet Eaten   03/28/18 1557 100 %   03/27/18 0944 100 %   03/25/18 1527 100 %   03/25/18 0904 100 %      BMI: 36.1 kg/m^2 is  obese (30%-39.9% BMI)      PMH: asthma, CAD, DM, diabetic neuropathy     Current Hospital Problems: Pt admitted for right foot cellulitis, foot ulcer. Nutrition intervention not currently indicated. Pt is not at nutritional risk at this time. Will rescreen per policy.      REASON FOR ASSESSMENT:       [x] CARITO Hinojosa RD  Pager: [phone]

## 2018-03-29 NOTE — PROGRESS NOTES
[facility]  [place] [address] [facility], [address] [facility]  Drs: Mary Vaughan, Peggy Cameron    Plan:  1. After conversation with Dr. Scooter Dowd, patient will have IV antibiotics per picc line because this patient has a long standing chronic history of off and on infections and even after resection of infected bone, his foot is better, but with some redness and discharge(minor). Also, the patient has a severe GI upset with p.o antibiotics. 2. Patient will be D/C within the next 24-48 hours. 3. Ideally we could order home health nurses, but my patient won't allow it and prefers to do his own dressing. 4. Patient is to be \"strict\" non wt bearing on his right foot foot with a scooter and cam boot on his left foot. S/  Patient is seen today and says he has 0/10 pain and no SOB or chills nor night sweats. O/  Alert & oriented in no apparent distress and sitting on the side of his bed using laptop. Results for Connie Abrams (MRN [id number]) as of 3/29/2018 19:33   Ref. Range 3/24/2018 04:40 3/25/2018 05:25   WBC Latest Ref Range: 4.6 - 13.2 K/uL 7.1 6.1   RBC Latest Ref Range: 4.70 - 5.50 M/uL 3.58 (L) 3.39 (L)   HGB Latest Ref Range: 13.0 - 16.0 g/dL 10.8 (L) 10.2 (L)   HCT Latest Ref Range: 36.0 - 48.0 % 32.8 (L) 30.9 (L)   MCV Latest Ref Range: 74.0 - 97.0 FL 91.6 91.2   MCH Latest Ref Range: 24.0 - 34.0 PG 30.2 30.1   MCHC Latest Ref Range: 31.0 - 37.0 g/dL 32.9 33.0   RDW Latest Ref Range: 11.6 - 14.5 % 13.3 13.2   PLATELET Latest Ref Range: 135 - 420 K/uL 196 176       Results for Connie Abrams (MRN [id number]) as of 3/29/2018 19:33   Ref.  Range 3/28/2018 03:58 3/29/2018 05:13   Sodium Latest Ref Range: 136 - 145 mmol/L 146 (H) 145   Potassium Latest Ref Range: 3.5 - 5.5 mmol/L 4.2 3.7   Chloride Latest Ref Range: 100 - 108 mmol/L 111 (H) 109 (H)   CO2 Latest Ref Range: 21 - 32 mmol/L 30 28   Anion gap Latest Ref Range: 3.0 - 18 mmol/L 5 8   Glucose Latest Ref Range: 74 - 99 mg/dL 113 (H) 133 (H)   BUN Latest Ref Range: 7.0 - 18 MG/DL 10 10   Creatinine Latest Ref Range: 0.6 - 1.3 MG/DL 0.94 0.97   BUN/Creatinine ratio Latest Ref Range: 12 - 20   11 (L) 10 (L)   Calcium Latest Ref Range: 8.5 - 10.1 MG/DL 8.4 (L) 8.3 (L)     3/29/2018 11:40 AM - [facility]       Component Results      Component Value Flag Ref Range Units Status     Special Requests: NO SPECIAL REQUESTS     Preliminary     Culture result: FEW DIPHTHEROIDS (A)        3/27/2018  2:39 PM - [facility]       Component Results      Component Value Flag Ref Range Units Status     Special Requests: NO SPECIAL REQUESTS     Final     GRAM STAIN RARE WBC'S     Final     GRAM STAIN NO ORGANISMS SEEN     Final     Culture result:  (A)    Final     FEW STAPHYLOCOCCUS SPECIES, COAGULASE NEGATIVE     Culture result:  (A)    Final     RARE STREPTOCOCCI, BETA HEMOLYTIC GROUP B     Culture result:  (A)    Final     MORAXELLA OSLOENSIS ISOLATED FROM BROTH ONLY       Culture & Susceptibility      COAGULASE NEGATIVE STAPHYLOCOCCUS SPECIES      Antibiotic Sensitivity LANA Unit Status     Ampicillin/sulbactam ($) Resistant  ug/mL Final     Method: LANA     Cefazolin ($) Resistant  ug/mL Final     Method: LANA     Clindamycin ($) Susceptible <=0.25 ug/mL Final     Method: LANA     Erythromycin ($$$$) Susceptible 0.5 ug/mL Final     Method: LANA     Gentamicin ($) Intermediate 8 ug/mL Final     Method: LANA     Levofloxacin ($) Resistant >=8 ug/mL Final     Method: LANA     Oxacillin Resistant 0.5 ug/mL Final     Method: LANA     Penicillin G ($$) Resistant 0.12 ug/mL Final     Method: LANA     Rifampin ($$$$) Susceptible <=0.5 ug/mL Final     Method: LANA     Tetracycline Susceptible <=1 ug/mL Final     Method: LANA     Tigecycline ($$$$) Susceptible <=0.12 ug/mL Final     Method: LANA     Trimeth-Sulfamethoxa Susceptible <=10 ug/mL Final     Method: LANA     Vancomycin ($) Susceptible 1 ug/mL Final     Method: LANA     3/29/2018  8:07 AM - [facility]       Component Results      Component Value Flag Ref Range Units Status     Special Requests: NO SPECIAL REQUESTS     Final     GRAM STAIN NO ORGANISMS SEEN     Final     GRAM STAIN NO WBC'S SEEN     Final     Culture result:  (A)    Final     RARE STREPTOCOCCI, BETA HEMOLYTIC GROUP B     Culture result:  (A)    Final     RARE STAPHYLOCOCCUS AUREUS     Culture result:  (A)    Final     RARE STAPHYLOCOCCUS HOMINIS     Culture result:  (A)    Final     BACILLUS SPECIES, NOT ANTHRACIS ISOLATED FROM BROTH ONLY       Culture & Susceptibility      STAPHYLOCOCCUS HOMINIS      Antibiotic Sensitivity LANA Unit Status     Ampicillin/sulbactam ($) Susceptible DEDUCED SENSITIVE ug/mL Final     Method: HELM BUCHANAN     Cefazolin ($) Susceptible DEDUCED SENSITIVE ug/mL Final     Method: HELM BUCHANAN     Clindamycin ($) Susceptible  ug/mL Final     Method: VOLODYMYR BUCHANAN     Erythromycin ($$$$) Susceptible  ug/mL Final     Method: VOLODYMYR BUCHANAN     Gentamicin ($) Susceptible  ug/mL Final     Method: VOLODYMYR BUCHANAN     Levofloxacin ($) Susceptible  ug/mL Final     Method: HELM BUCHANAN     Oxacillin Susceptible  ug/mL Final     Method: HELM BUCHANAN     Penicillin G ($$) Resistant  ug/mL Final     Method: HELM BUCHANAN     Rifampin ($$$$) Susceptible  ug/mL Final     Comment: Rifampin is not to be used for mono-therapy.     Method: HELM BUCHANAN     Tetracycline Susceptible  ug/mL Final     Method: HELM BUCHANAN     Trimeth-Sulfamethoxa Susceptible  ug/mL Final     Method: VOLODYMYR BUCHANAN     Vancomycin ($) Susceptible 1              Culture result:  (A)    Final      FEW PEPTOSTREPTOCOCCUS RAMÓN         Pre-op MRI results:  IMPRESSION:  1. Medial right forefoot soft tissue ulcer with associated osteomyelitis of the  distal first metatarsal, sesamoids, and great toe proximal phalanx. No evidence  of necrotic or nonenhancing bone.  Small amount of fluid at the residual joint  space is noted to demonstrate mild enhancement, in keeping with associated  septic arthritis of the first MTP joint. 2. Skin thickening and enhancement involving the great toe, greatest medially in  keeping with associated cellulitis. 3. No organized or drainable fluid collection. 4. Diffusely abnormal intrinsic muscle bulk and signal in keeping with sequela  of subacute denervation    Post-op MRI results:  IMPRESSION:        1. Postsurgical changes in keeping with resection of the base of the first  proximal phalanx, first MTP, and distal half of the first metatarsal. There is  mild marrow edema along the margins of the first metatarsal stump as well as the  surgical margin of the first digit which is nonspecific,  and likely resents  reactive postsurgical change without specific MRI evidence of residual  osteomyelitis. There is a significant amount of residual gas and/or  susceptibility artifact from surgery in the surgical bed with presumed  granulation tissue. Residual infection is not entirely excluded, however. 2. Smaller ulcerative lesion along the plantar arch of the midfoot containing a  small amount of subcutaneous fluid, potentially abscess. 3. Generalized soft tissue swelling and skin thickening. Recommend correlation  for cellulitis.

## 2018-03-29 NOTE — DIABETES MGMT
GLYCEMIC CONTROL PROGRESS NOTE:    -known h/o T2DM, HbA1C not within recommended range for age + comorbids on Mixed insulin home regimen  -BG continues to be out of target range non-ICU: < 140 mg/L, despite frequent adjustments  -TDD = (30 Lantus + 45 mealtime + 27 - Humalog Very Insulin Resistant Corrective Coverage)  -FBG in target range, PPG out of target, recommend monitor overnight BG trends, pt may benefit from increase in mealtime    Recent Glucose Results:   Lab Results   Component Value Date/Time     (H) 03/29/2018 05:13 AM    GLUCPOC 271 (H) 03/29/2018 12:09 PM    GLUCPOC 118 (H) 03/29/2018 07:45 AM    GLUCPOC 214 (H) 03/28/2018 08:58 PM           Aniceto Barrientos RN, MS  Glycemic Control Team  Pager [phone] (M-TH 8:30-5P)  *After Hours pager [phone]

## 2018-03-29 NOTE — PROGRESS NOTES
Hospitalist Progress Note    Patient: Keira Patricia MRN: [id number]  CSN: [id number]    YOB: 1945  Age: 71 y.o. Sex: male    DOA: 3/21/2018 LOS:  LOS: 8 days                Assessment/Plan     Patient Active Problem List   Diagnosis Code    Diabetic foot infection ([facility] E11.69, L08.9    HTN (hypertension) I10    Diabetic neuropathy ([facility] E11.40    Diabetic foot ulcer ([facility] E11.621, L97.509    Diabetes ([facility] E11.9    CAD (coronary artery disease) I25.10    Cellulitis L03.90    Osteomyelitis of right foot ([facility] M86.9            72 yo male admitted for right foot cellulitis, foot ulcer    Cellulitis/osteomyelitis/diabetic foot infection, Right - failed outpatient antibiotics. Blood cultures with no growth  s/p I&D right foot and resection of infected bone 03/23/2018. Wound cultures with polymicrobial growth. Seen by ID  Antibiotics - vancomycin and zosyn per ID recommendations. Discussed with ID, do not recommend long term antibiotics if the bone margins are clear. Discussed with Dr. Lyudmila Cooper, pathology report did not mention about margins. However he feels patient needs 6 weeks of antibiotics    Patient does not want home health dressing changes. He is also not compliant with non weight baring on his right foot. Discussed compliance and dressing changes concerns with patient. He still does not want home health wound care.      DM - continue home insulin and SSI.     HTN - monitor BP     CAD - continue with aspirin     DVT prophylaxis    45 total min's spent on patient care including >50% on counseling/coordinating care. Discussed the above assessments. also discussed labs, medications and hospital course. Disposition : tomorrow    Review of systems  General: No fevers or chills. Cardiovascular: No chest pain or pressure. No palpitations. Pulmonary: No shortness of breath. Gastrointestinal: No nausea, vomiting.        Physical Exam:  General: Awake, cooperative, no acute distress    HEENT: NC, Atraumatic. PERRLA, anicteric sclerae. Lungs: CTA Bilaterally. No Wheezing/Rhonchi/Rales. Heart:  Regular  rhythm,  No murmur, No Rubs, No Gallops  Abdomen: Soft, Non distended, Non tender.  +Bowel sounds,   Extremities: Right foot with dressing. diabetic foot ulcer right heel and bilateral MTP joint. Open wound right mid foot plantar aspect, with drainage. Psych:   Not anxious or agitated. Neurologic:  No acute neurological deficit. Vital signs/Intake and Output:  Visit Vitals    /55 (BP 1 Location: Right arm, BP Patient Position: At rest)    Pulse 66    Temp 98 °F (36.7 °C)    Resp 18    Ht 6' 1\" (1.854 m)    Wt 124 kg (273 lb 4.8 oz)    SpO2 97%    BMI 36.06 kg/m2     Current Shift:     Last three shifts:  03/27 1901 - 03/29 0700  In: 1440 [P.O.:240; I.V.:1200]  Out: 3350 [Urine:3350]            Labs: Results:       Chemistry Recent Labs      03/29/18   0513  03/28/18   0358  03/27/18   0635   GLU  133*  113*  136*   NA  145  146*  145   K  3.7  4.2  3.7   CL  109*  111*  110*   CO2  28  30  26   BUN  10  10  9   CREA  0.97  0.94  0.82   CA  8.3*  8.4*  8.0*   AGAP  8  5  9   BUCR  10*  11*  11*      CBC w/Diff No results for input(s): WBC, RBC, HGB, HCT, PLT, GRANS, LYMPH, EOS, HGBEXT, HCTEXT, PLTEXT, HGBEXT, HCTEXT, PLTEXT in the last 72 hours. Cardiac Enzymes No results for input(s): CPK, CKND1, NELLA in the last 72 hours. No lab exists for component: CKRMB, TROIP   Coagulation No results for input(s): PTP, INR, APTT in the last 72 hours. No lab exists for component: INREXT, INREXT    Lipid Panel No results found for: CHOL, CHOLPOCT, CHOLX, CHLST, CHOLV, 912052, HDL, LDL, LDLC, DLDLP, 208951, VLDLC, VLDL, TGLX, TRIGL, TRIGP, TGLPOCT, CHHD, CHHDX   BNP No results for input(s): BNPP in the last 72 hours. Liver Enzymes No results for input(s): TP, ALB, TBIL, AP, SGOT, GPT in the last 72 hours.     No lab exists for component: DBIL   Thyroid Studies No results found for: T4, T3U, TSH, TSHEXT, TSHEXT     Procedures/imaging: see electronic medical records for all procedures/Xrays and details which were not copied into this note but were reviewed prior to creation of Plan

## 2018-03-29 NOTE — PROGRESS NOTES
Not very talkative, upset over having to stay, upset with his wife, but he did not offer many details. I expressed the chaplains' availability if he wanted it.     Chaplain Adams Bruner

## 2018-03-29 NOTE — PROGRESS NOTES
Pt very talkative at shift change, no complaints noted. Bilateral feet wrapped with ace wrap and received report that dressing was just done as ordered. 0200 Pt given Robitussin and requested, no complaints of pain, sitting up on side of bed.    0330 Pt still sitting on side of bed, working on personal computer, did not respond to verbal stimulus. No distress noted    0650 Shift uneventful, pt resting in bed. Vancomycin still infusing at this time, passed on I report to given scheduled Zosyn.

## 2018-03-30 ENCOUNTER — APPOINTMENT (OUTPATIENT)
Dept: INTERVENTIONAL RADIOLOGY/VASCULAR | Age: 73
DRG: 629 | End: 2018-03-30
Attending: HOSPITALIST
Payer: MEDICARE

## 2018-03-30 LAB
ATRIAL RATE: 75 BPM
CALCULATED R AXIS, ECG10: 60 DEGREES
CALCULATED T AXIS, ECG11: -40 DEGREES
CK MB CFR SERPL CALC: 4 % (ref 0–4)
CK MB CFR SERPL CALC: 4.4 % (ref 0–4)
CK MB SERPL-MCNC: 1.2 NG/ML (ref 5–25)
CK MB SERPL-MCNC: 1.2 NG/ML (ref 5–25)
CK SERPL-CCNC: 27 U/L (ref 39–308)
CK SERPL-CCNC: 30 U/L (ref 39–308)
DIAGNOSIS, 93000: NORMAL
GLUCOSE BLD STRIP.AUTO-MCNC: 148 MG/DL (ref 70–110)
GLUCOSE BLD STRIP.AUTO-MCNC: 239 MG/DL (ref 70–110)
GLUCOSE BLD STRIP.AUTO-MCNC: 248 MG/DL (ref 70–110)
GLUCOSE BLD STRIP.AUTO-MCNC: 250 MG/DL (ref 70–110)
INR PPP: 1.1 (ref 0.8–1.2)
PROTHROMBIN TIME: 13.9 SEC (ref 11.5–15.2)
Q-T INTERVAL, ECG07: 392 MS
QRS DURATION, ECG06: 92 MS
QTC CALCULATION (BEZET), ECG08: 441 MS
TROPONIN I SERPL-MCNC: 0.23 NG/ML (ref 0–0.06)
TROPONIN I SERPL-MCNC: 0.54 NG/ML (ref 0–0.06)
VENTRICULAR RATE, ECG03: 76 BPM

## 2018-03-30 PROCEDURE — 93005 ELECTROCARDIOGRAM TRACING: CPT

## 2018-03-30 PROCEDURE — 76937 US GUIDE VASCULAR ACCESS: CPT

## 2018-03-30 PROCEDURE — 02HV33Z INSERTION OF INFUSION DEVICE INTO SUPERIOR VENA CAVA, PERCUTANEOUS APPROACH: ICD-10-PCS | Performed by: PODIATRIST

## 2018-03-30 PROCEDURE — 74011250636 HC RX REV CODE- 250/636: Performed by: RADIOLOGY

## 2018-03-30 PROCEDURE — 74011250637 HC RX REV CODE- 250/637: Performed by: INTERNAL MEDICINE

## 2018-03-30 PROCEDURE — C8929 TTE W OR WO FOL WCON,DOPPLER: HCPCS

## 2018-03-30 PROCEDURE — 74011000258 HC RX REV CODE- 258: Performed by: HOSPITALIST

## 2018-03-30 PROCEDURE — 65660000000 HC RM CCU STEPDOWN

## 2018-03-30 PROCEDURE — 74011000250 HC RX REV CODE- 250: Performed by: RADIOLOGY

## 2018-03-30 PROCEDURE — 74011250637 HC RX REV CODE- 250/637: Performed by: PHYSICIAN ASSISTANT

## 2018-03-30 PROCEDURE — 82550 ASSAY OF CK (CPK): CPT | Performed by: INTERNAL MEDICINE

## 2018-03-30 PROCEDURE — 74011636637 HC RX REV CODE- 636/637: Performed by: HOSPITALIST

## 2018-03-30 PROCEDURE — 74011250636 HC RX REV CODE- 250/636: Performed by: HOSPITALIST

## 2018-03-30 PROCEDURE — 74011250637 HC RX REV CODE- 250/637: Performed by: HOSPITALIST

## 2018-03-30 PROCEDURE — 82962 GLUCOSE BLOOD TEST: CPT

## 2018-03-30 PROCEDURE — 74011636637 HC RX REV CODE- 636/637: Performed by: PHYSICIAN ASSISTANT

## 2018-03-30 PROCEDURE — 85610 PROTHROMBIN TIME: CPT | Performed by: HOSPITALIST

## 2018-03-30 PROCEDURE — 74011000250 HC RX REV CODE- 250: Performed by: HOSPITALIST

## 2018-03-30 PROCEDURE — 36415 COLL VENOUS BLD VENIPUNCTURE: CPT | Performed by: HOSPITALIST

## 2018-03-30 RX ORDER — ENOXAPARIN SODIUM 150 MG/ML
120 INJECTION SUBCUTANEOUS EVERY 12 HOURS
Status: DISCONTINUED | OUTPATIENT
Start: 2018-03-30 | End: 2018-04-02

## 2018-03-30 RX ORDER — HYDROCODONE BITARTRATE AND ACETAMINOPHEN 5; 325 MG/1; MG/1
1 TABLET ORAL
Status: DISCONTINUED | OUTPATIENT
Start: 2018-03-30 | End: 2018-04-05 | Stop reason: HOSPADM

## 2018-03-30 RX ORDER — ATORVASTATIN CALCIUM 20 MG/1
40 TABLET, FILM COATED ORAL
Status: DISCONTINUED | OUTPATIENT
Start: 2018-03-30 | End: 2018-04-05 | Stop reason: HOSPADM

## 2018-03-30 RX ORDER — LIDOCAINE HYDROCHLORIDE 10 MG/ML
1-20 INJECTION INFILTRATION; PERINEURAL
Status: COMPLETED | OUTPATIENT
Start: 2018-03-30 | End: 2018-03-30

## 2018-03-30 RX ORDER — INSULIN LISPRO 100 [IU]/ML
INJECTION, SOLUTION INTRAVENOUS; SUBCUTANEOUS
Qty: 1 VIAL | Refills: 0 | Status: SHIPPED
Start: 2018-03-30 | End: 2021-07-07

## 2018-03-30 RX ORDER — HEPARIN SODIUM (PORCINE) LOCK FLUSH IV SOLN 100 UNIT/ML 100 UNIT/ML
500 SOLUTION INTRAVENOUS
Status: COMPLETED | OUTPATIENT
Start: 2018-03-30 | End: 2018-03-30

## 2018-03-30 RX ORDER — HEPARIN SODIUM 200 [USP'U]/100ML
500 INJECTION, SOLUTION INTRAVENOUS
Status: COMPLETED | OUTPATIENT
Start: 2018-03-30 | End: 2018-03-30

## 2018-03-30 RX ORDER — CARVEDILOL 3.12 MG/1
3.12 TABLET ORAL 2 TIMES DAILY WITH MEALS
Status: DISCONTINUED | OUTPATIENT
Start: 2018-03-30 | End: 2018-04-05

## 2018-03-30 RX ORDER — VANCOMYCIN 1.75 GRAM/500 ML IN 0.9 % SODIUM CHLORIDE INTRAVENOUS
1750 EVERY 12 HOURS
Qty: 500 ML | Refills: 0 | Status: SHIPPED
Start: 2018-03-30 | End: 2018-04-05

## 2018-03-30 RX ORDER — HYDROMORPHONE HYDROCHLORIDE 1 MG/ML
1 INJECTION, SOLUTION INTRAMUSCULAR; INTRAVENOUS; SUBCUTANEOUS
Status: DISCONTINUED | OUTPATIENT
Start: 2018-03-30 | End: 2018-03-30 | Stop reason: ALTCHOICE

## 2018-03-30 RX ADMIN — CLOTRIMAZOLE 1 EACH: 10 CREAM TOPICAL at 21:36

## 2018-03-30 RX ADMIN — VANCOMYCIN HYDROCHLORIDE 1750 MG: 10 INJECTION, POWDER, LYOPHILIZED, FOR SOLUTION INTRAVENOUS at 09:36

## 2018-03-30 RX ADMIN — INSULIN GLARGINE 30 UNITS: 100 INJECTION, SOLUTION SUBCUTANEOUS at 09:35

## 2018-03-30 RX ADMIN — INSULIN LISPRO 9 UNITS: 100 INJECTION, SOLUTION INTRAVENOUS; SUBCUTANEOUS at 13:33

## 2018-03-30 RX ADMIN — SODIUM CHLORIDE 1 ML: 9 INJECTION INTRAMUSCULAR; INTRAVENOUS; SUBCUTANEOUS at 16:03

## 2018-03-30 RX ADMIN — INSULIN LISPRO 15 UNITS: 100 INJECTION, SOLUTION INTRAVENOUS; SUBCUTANEOUS at 09:35

## 2018-03-30 RX ADMIN — PIPERACILLIN SODIUM,TAZOBACTAM SODIUM 3.38 G: 3; .375 INJECTION, POWDER, FOR SOLUTION INTRAVENOUS at 03:19

## 2018-03-30 RX ADMIN — INSULIN LISPRO 15 UNITS: 100 INJECTION, SOLUTION INTRAVENOUS; SUBCUTANEOUS at 13:32

## 2018-03-30 RX ADMIN — GUAIFENESIN 100 MG: 200 SOLUTION ORAL at 21:44

## 2018-03-30 RX ADMIN — CARVEDILOL 3.12 MG: 3.12 TABLET, FILM COATED ORAL at 16:46

## 2018-03-30 RX ADMIN — INSULIN LISPRO 6 UNITS: 100 INJECTION, SOLUTION INTRAVENOUS; SUBCUTANEOUS at 21:37

## 2018-03-30 RX ADMIN — LIDOCAINE HYDROCHLORIDE 2 ML: 10 INJECTION, SOLUTION INFILTRATION; PERINEURAL at 11:27

## 2018-03-30 RX ADMIN — HEPARIN SODIUM (PORCINE) LOCK FLUSH IV SOLN 100 UNIT/ML 500 UNITS: 100 SOLUTION at 11:29

## 2018-03-30 RX ADMIN — INSULIN LISPRO 15 UNITS: 100 INJECTION, SOLUTION INTRAVENOUS; SUBCUTANEOUS at 16:48

## 2018-03-30 RX ADMIN — INSULIN LISPRO 6 UNITS: 100 INJECTION, SOLUTION INTRAVENOUS; SUBCUTANEOUS at 16:48

## 2018-03-30 RX ADMIN — VANCOMYCIN HYDROCHLORIDE 1750 MG: 10 INJECTION, POWDER, LYOPHILIZED, FOR SOLUTION INTRAVENOUS at 21:17

## 2018-03-30 RX ADMIN — PIPERACILLIN SODIUM,TAZOBACTAM SODIUM 3.38 G: 3; .375 INJECTION, POWDER, FOR SOLUTION INTRAVENOUS at 21:17

## 2018-03-30 RX ADMIN — Medication 1000 UNITS: at 11:26

## 2018-03-30 RX ADMIN — ENOXAPARIN SODIUM 120 MG: 120 INJECTION SUBCUTANEOUS at 16:47

## 2018-03-30 RX ADMIN — ENOXAPARIN SODIUM 40 MG: 40 INJECTION SUBCUTANEOUS at 03:20

## 2018-03-30 RX ADMIN — POLYETHYLENE GLYCOL 3350 17 G: 17 POWDER, FOR SOLUTION ORAL at 09:36

## 2018-03-30 RX ADMIN — ASPIRIN 81 MG 81 MG: 81 TABLET ORAL at 09:36

## 2018-03-30 RX ADMIN — PIPERACILLIN SODIUM,TAZOBACTAM SODIUM 3.38 G: 3; .375 INJECTION, POWDER, FOR SOLUTION INTRAVENOUS at 16:39

## 2018-03-30 NOTE — PROGRESS NOTES
80 - Assumed care at this time. 5610 - Pt sitting at edge of bed at this time. A&Ox4, RA. Pain rated 0/10. ACE dressings to bilateral feet CDI. Pt denies SOB. Lung sounds clear, respirations even and unlabored. Pt c/o chest tightness and blurry vision. Charge nurse notified, vitals stable. Pt to receive EKG/ labs. Will continue to monitor.

## 2018-03-30 NOTE — PROGRESS NOTES
2010 - Assumed care of patient at this time. Patient is alert and oriented x 4. Patient denies chest pain, shortness of breath, or numbness or tingling in the upper or lower extremities. Ace bandage dressings on the left and right foot are clean, dry and intact. 22g IV in the left forearm is patent and currently infusing IV antibiotics. Will stop infusion when antibiotics have finished. Patient currently experiencing 0/10 pain. Bed is in lowest position with the wheels locked. Siderails x 3 are up. Call bell within reach. Patient is currently resting in bed in no apparent distress. Will continue to monitor. 2123 - Head to toe assessment performed at this time. Patient has no complaints of chest pain or shortness of breath. Denies any numbness or tingling in extremities. Lungs are clear to auscultation. Bowel sounds are present in all 4 quadrants. Patient educated how to  actively manage their pain. Patient encouraged to use the incentive spirometer. Patient educated on the side effects of medications. Explained the importance of NWB on the RLE. Instructed the patient not to attempt to get out of bed and/or ambulate without a staff member present. Patient verbalized understanding. Patient left in bed with call light within reach, bed in lowest position with wheels locked, and siderails x 3 up. Will continue to monitor.

## 2018-03-30 NOTE — PROGRESS NOTES
Problem: Mobility Impaired (Adult and Pediatric)  Goal: *Acute Goals and Plan of Care (Insert Text)  In 1-7 days pt will be able to perform:  ST.  Bed mobility:  Rolling L to R to L modified independent for positioning. 2.  Supine to sit to supine S with HR for meals. 3.  Sit to stand to sit S with knee scooter in prep for ambulation. LT.  Gait:  Ambulate >100ft S with knee scooter NWB, for home/community mobility. 2.  Stair Negotiation:  Ascend/descend >3 steps CGA with HR NWB on R for home entry. 3.  Patient/Family Education:  Patient/family to be independent with HEP for follow-up care and safe discharge. 3/30/2018  PT treatment not completed due to:  [x] Off Unit for testing/procedure-Echo  [] Pain  [] Eating  [] Patient too lethargic  [] Nausea/vomiting  [] Dialysis treatment in progress   [] Other:  Will f/u tomorrow as pt schedule allows.     Adriana Roberson, PT

## 2018-03-30 NOTE — ROUTINE PROCESS
Bedside and Verbal shift change report given to MARIBEL Luo RN by Margareth Chappell RN. Report included the following information SBAR, Kardex, OR Summary, Intake/Output and MAR.

## 2018-03-30 NOTE — PROGRESS NOTES
1830-report given to Walter Mitchell RN included SBAR MAR and Kardex. 1900-pt transferred to rm [id number].

## 2018-03-30 NOTE — PROGRESS NOTES
[facility]  [place] [address] [facility], [address] [facility]  Drs: Benjy Black, Peggy Cameron    Plan: 1. Patient to get picc line today  2. Patient to be d/c today  3. Patient to remain complete non wt bearing on right foot and cam boot on the left foot, if hospitat can't locate cam boot. The patient can contact our office and have dispenced pending insurance approval.  4. Patient will resume all meds prior to hospitatlization. 5. Patient wants to do his own dressing packing changes to right foot iodoform in dakins solution 1/4st  5. He will f/u with me in one week of discharge. 6. Physical therapy aiding in getting a scooter. 7. Nurses are doing dressing changes    S/  Patient seen no pain or chills. O/  Alert and ready to go home In NAD    Dressing is dry and intact on both feet. Patient's arch redness and dorsally is decreasing each day.

## 2018-03-30 NOTE — PROGRESS NOTES
Per MD Eileen Portillo, pt will not dc today due to chest pain. Librado Agnieszka @ [facility] aware. DC plan will be for pt to dc tomorrow to [facility], if medically ready. MICHI/ Fred Neal at [address] [place] [address]. [phone] for report. Per notes, pt will need a cam boot. Pt states he has one at home. CM called pts spouse and LM asking if she could bring boot to [facility]. 0  Spoke with pts spouse and she is uncertain if boot she has at home is a cam boot. Will bring to hospital tomorrow. Order will be placed with [facility] for cam boot and knee scooter rental.  Divya Jamison at [address] [facility] aware [phone]. Referral placed in discharge by Taffy Castleman.

## 2018-03-30 NOTE — PROGRESS NOTES
Chart reviewed. Per MD MENDEZ [facility] note, pt will require IV abx. Per foc, will place referral for [facility] as discussed with patient yesterday. CM will cont to follow. Tamanna Dobbs with Jenny Colunga @ [facility]. They are able to accept patient today around [id number]. Pt is currently getting PICC. Will need hard scripts for IV abx at discharge. 1200  Went to bedside and discussed plan of discharge with patient. Pt informed he has been accepted at [facility]. Pt agitated stating \"I guess that's what I have to do. \"  CM reinforced pt could dc with home health and he still continues to refuse that option. CM had lengthy conversation regarding discharge including the fact that patient did have a choice, including signing out AMA and going home. After lengthy conversation pt agreed to go to rehab, as previously discussed. MD Mary Juarez made aware of conversation. Transport will be arranged for 1630 (per [facility] request). Pt is aware. Unit secretary will arranged transport.

## 2018-03-30 NOTE — ROUTINE PROCESS
Bedside and Verbal shift change report given to Felipa Jaeger RN (oncoming nurse) by Vane Moore RN (offgoing nurse). Report included the following information SBAR, Kardex, Procedure Summary, Intake/Output, MAR and Recent Results.

## 2018-03-30 NOTE — PROGRESS NOTES
0830 hrs Patient resting in bed, denies pain at this time. He is alert and oriented, seems to be in good, positive mood this morning. His scrotum was very irritated. Dr Leticia Barnard ordered Lotrim cream at this time. 1100 hrs Patient was found in the bathroom standing on both feet, did not called for help. He was educated to keep weight off his right foot and use bedpan. He verbalizes understanding. 1200 hrs Patient refused Vancomycin, re-timed dose for 1430hrs. 1600hrs Bedside hydergine performed, Lotrim cream applied to scotum. 2025hrs Bedside, Verbal and Written shift change report given to Claude Dubois, RN  (oncoming nurse) by Michael Peraza RN (offgoing nurse). Report included the following information SBAR, Kardex, Procedure Summary, Intake/Output, MAR, Accordion, Recent Results and Med Rec Status. Patient sleeping, all questions answered.

## 2018-03-30 NOTE — PROGRESS NOTES
Problem: Falls - Risk of  Goal: *Absence of Falls  Document Lise Fall Risk and appropriate interventions in the flowsheet.    Outcome: Progressing Towards Goal  Fall Risk Interventions:  Mobility Interventions: Patient to call before getting OOB, OT consult for ADLs, PT Consult for mobility concerns, Utilize walker, cane, or other assitive device         Medication Interventions: Patient to call before getting OOB, Teach patient to arise slowly    Elimination Interventions: Call light in reach, Patient to call for help with toileting needs, Toileting schedule/hourly rounds, Urinal in reach    History of Falls Interventions: Door open when patient unattended

## 2018-03-30 NOTE — PROGRESS NOTES
TRANSFER - IN REPORT:    Verbal report received from kandace MORAN RN(name) on Angel Gunter  being received from [address] [place] [address](unit) for change in patient condition( elevated troponin)      Report consisted of patients Situation, Background, Assessment and   Recommendations(SBAR). Information from the following report(s) SBAR was reviewed with the receiving nurse. Opportunity for questions and clarification was provided. Assessment completed upon patients arrival to unit and care assumed.

## 2018-03-30 NOTE — PROGRESS NOTES
Problem: Mobility Impaired (Adult and Pediatric)  Goal: *Acute Goals and Plan of Care (Insert Text)  In 1-7 days pt will be able to perform:  ST.  Bed mobility:  Rolling L to R to L modified independent for positioning. 2.  Supine to sit to supine S with HR for meals. 3.  Sit to stand to sit S with knee scooter in prep for ambulation. LT.  Gait:  Ambulate >100ft S with knee scooter NWB, for home/community mobility. 2.  Stair Negotiation:  Ascend/descend >3 steps CGA with HR NWB on R for home entry. 3.  Patient/Family Education:  Patient/family to be independent with HEP for follow-up care and safe discharge. 3/30/2018  PT treatment not completed due to:  [] Off Unit for testing/procedure  [] Pain  [] Eating  [] Patient too lethargic  [] Nausea/vomiting  [] Dialysis treatment in progress   [x] Other: sitting EOB about to use urinal at this time. Will f/u at later time as pt schedule allows.     Sumaya Felix, PT

## 2018-03-30 NOTE — PROGRESS NOTES
Attended IDRs with MD Chasity Banda. Pt is being followed by ID and MD Gardenia Olszewski (podiatry). Patient still does have had PICC access. Per MD Chasity Banda, awaiting to see if patient will need IV abx at discharge vs PO abx.      1600  Met with patient again at bedside. Again offered foc for both home health and rehab. Pt still adamantly refusing home health. Patient again made aware [facility] would not be an option if IV abx prescribed was needed more than once daily. Pt verbalized understanding. Informed him if he did home health his IV abx would be covered at 100%, per [facility]. Pt is stating now that he might consider going to SNF, if needed. [facility]. Referral will be placed if needed. CM will cont to follow.

## 2018-03-31 LAB
ALBUMIN SERPL-MCNC: 2.2 G/DL (ref 3.4–5)
ALBUMIN/GLOB SERPL: 0.6 {RATIO} (ref 0.8–1.7)
ALP SERPL-CCNC: 70 U/L (ref 45–117)
ALT SERPL-CCNC: 29 U/L (ref 16–61)
ANION GAP SERPL CALC-SCNC: 5 MMOL/L (ref 3–18)
AST SERPL-CCNC: 17 U/L (ref 15–37)
BACTERIA SPEC CULT: ABNORMAL
BASOPHILS # BLD: 0.1 K/UL (ref 0–0.06)
BASOPHILS NFR BLD: 1 % (ref 0–2)
BILIRUB SERPL-MCNC: 0.2 MG/DL (ref 0.2–1)
BUN SERPL-MCNC: 10 MG/DL (ref 7–18)
BUN/CREAT SERPL: 13 (ref 12–20)
CALCIUM SERPL-MCNC: 8 MG/DL (ref 8.5–10.1)
CHLORIDE SERPL-SCNC: 114 MMOL/L (ref 100–108)
CK MB CFR SERPL CALC: ABNORMAL % (ref 0–4)
CK MB SERPL-MCNC: <1 NG/ML (ref 5–25)
CK SERPL-CCNC: 20 U/L (ref 39–308)
CO2 SERPL-SCNC: 29 MMOL/L (ref 21–32)
CREAT SERPL-MCNC: 0.79 MG/DL (ref 0.6–1.3)
DIFFERENTIAL METHOD BLD: ABNORMAL
EOSINOPHIL # BLD: 0.2 K/UL (ref 0–0.4)
EOSINOPHIL NFR BLD: 3 % (ref 0–5)
ERYTHROCYTE [DISTWIDTH] IN BLOOD BY AUTOMATED COUNT: 13.7 % (ref 11.6–14.5)
GLOBULIN SER CALC-MCNC: 3.8 G/DL (ref 2–4)
GLUCOSE BLD STRIP.AUTO-MCNC: 119 MG/DL (ref 70–110)
GLUCOSE BLD STRIP.AUTO-MCNC: 187 MG/DL (ref 70–110)
GLUCOSE BLD STRIP.AUTO-MCNC: 215 MG/DL (ref 70–110)
GLUCOSE BLD STRIP.AUTO-MCNC: 285 MG/DL (ref 70–110)
GLUCOSE SERPL-MCNC: 146 MG/DL (ref 74–99)
HCT VFR BLD AUTO: 36.7 % (ref 36–48)
HGB BLD-MCNC: 12 G/DL (ref 13–16)
LYMPHOCYTES # BLD: 0.9 K/UL (ref 0.9–3.6)
LYMPHOCYTES NFR BLD: 16 % (ref 21–52)
MCH RBC QN AUTO: 30.8 PG (ref 24–34)
MCHC RBC AUTO-ENTMCNC: 32.7 G/DL (ref 31–37)
MCV RBC AUTO: 94.1 FL (ref 74–97)
MONOCYTES # BLD: 0.3 K/UL (ref 0.05–1.2)
MONOCYTES NFR BLD: 5 % (ref 3–10)
NEUTS SEG # BLD: 4 K/UL (ref 1.8–8)
NEUTS SEG NFR BLD: 75 % (ref 40–73)
PLATELET # BLD AUTO: 173 K/UL (ref 135–420)
PMV BLD AUTO: 10 FL (ref 9.2–11.8)
POTASSIUM SERPL-SCNC: 3.7 MMOL/L (ref 3.5–5.5)
PROT SERPL-MCNC: 6 G/DL (ref 6.4–8.2)
RBC # BLD AUTO: 3.9 M/UL (ref 4.7–5.5)
SERVICE CMNT-IMP: ABNORMAL
SODIUM SERPL-SCNC: 148 MMOL/L (ref 136–145)
TROPONIN I SERPL-MCNC: 0.24 NG/ML (ref 0–0.06)
WBC # BLD AUTO: 5.4 K/UL (ref 4.6–13.2)

## 2018-03-31 PROCEDURE — 74011636637 HC RX REV CODE- 636/637: Performed by: PHYSICIAN ASSISTANT

## 2018-03-31 PROCEDURE — 74011250636 HC RX REV CODE- 250/636: Performed by: HOSPITALIST

## 2018-03-31 PROCEDURE — 74011250636 HC RX REV CODE- 250/636: Performed by: INTERNAL MEDICINE

## 2018-03-31 PROCEDURE — 74011250637 HC RX REV CODE- 250/637: Performed by: INTERNAL MEDICINE

## 2018-03-31 PROCEDURE — 85025 COMPLETE CBC W/AUTO DIFF WBC: CPT | Performed by: INTERNAL MEDICINE

## 2018-03-31 PROCEDURE — 97110 THERAPEUTIC EXERCISES: CPT

## 2018-03-31 PROCEDURE — 82962 GLUCOSE BLOOD TEST: CPT

## 2018-03-31 PROCEDURE — 74011250637 HC RX REV CODE- 250/637: Performed by: HOSPITALIST

## 2018-03-31 PROCEDURE — 80053 COMPREHEN METABOLIC PANEL: CPT | Performed by: HOSPITALIST

## 2018-03-31 PROCEDURE — 74011250637 HC RX REV CODE- 250/637: Performed by: PHYSICIAN ASSISTANT

## 2018-03-31 PROCEDURE — 74011000250 HC RX REV CODE- 250: Performed by: INTERNAL MEDICINE

## 2018-03-31 PROCEDURE — 74011000258 HC RX REV CODE- 258: Performed by: HOSPITALIST

## 2018-03-31 PROCEDURE — 74011636637 HC RX REV CODE- 636/637: Performed by: HOSPITALIST

## 2018-03-31 PROCEDURE — 36592 COLLECT BLOOD FROM PICC: CPT

## 2018-03-31 PROCEDURE — 82550 ASSAY OF CK (CPK): CPT | Performed by: HOSPITALIST

## 2018-03-31 PROCEDURE — 65660000000 HC RM CCU STEPDOWN

## 2018-03-31 RX ADMIN — CARVEDILOL 3.12 MG: 3.12 TABLET, FILM COATED ORAL at 09:04

## 2018-03-31 RX ADMIN — INSULIN LISPRO 15 UNITS: 100 INJECTION, SOLUTION INTRAVENOUS; SUBCUTANEOUS at 17:46

## 2018-03-31 RX ADMIN — GUAIFENESIN 100 MG: 200 SOLUTION ORAL at 03:20

## 2018-03-31 RX ADMIN — ATORVASTATIN CALCIUM 40 MG: 20 TABLET, FILM COATED ORAL at 21:46

## 2018-03-31 RX ADMIN — VANCOMYCIN HYDROCHLORIDE 1750 MG: 10 INJECTION, POWDER, LYOPHILIZED, FOR SOLUTION INTRAVENOUS at 13:12

## 2018-03-31 RX ADMIN — ENOXAPARIN SODIUM 120 MG: 120 INJECTION SUBCUTANEOUS at 02:30

## 2018-03-31 RX ADMIN — CARVEDILOL 3.12 MG: 3.12 TABLET, FILM COATED ORAL at 17:47

## 2018-03-31 RX ADMIN — WATER 2 MG: 1 INJECTION INTRAMUSCULAR; INTRAVENOUS; SUBCUTANEOUS at 16:54

## 2018-03-31 RX ADMIN — INSULIN LISPRO 6 UNITS: 100 INJECTION, SOLUTION INTRAVENOUS; SUBCUTANEOUS at 21:47

## 2018-03-31 RX ADMIN — VANCOMYCIN HYDROCHLORIDE 1750 MG: 10 INJECTION, POWDER, LYOPHILIZED, FOR SOLUTION INTRAVENOUS at 19:46

## 2018-03-31 RX ADMIN — CLOTRIMAZOLE: 10 CREAM TOPICAL at 21:50

## 2018-03-31 RX ADMIN — WATER 2 MG: 1 INJECTION INTRAMUSCULAR; INTRAVENOUS; SUBCUTANEOUS at 15:00

## 2018-03-31 RX ADMIN — ENOXAPARIN SODIUM 120 MG: 120 INJECTION SUBCUTANEOUS at 14:35

## 2018-03-31 RX ADMIN — PIPERACILLIN SODIUM,TAZOBACTAM SODIUM 3.38 G: 3; .375 INJECTION, POWDER, FOR SOLUTION INTRAVENOUS at 02:31

## 2018-03-31 RX ADMIN — INSULIN GLARGINE 30 UNITS: 100 INJECTION, SOLUTION SUBCUTANEOUS at 13:12

## 2018-03-31 RX ADMIN — PIPERACILLIN SODIUM,TAZOBACTAM SODIUM 3.38 G: 3; .375 INJECTION, POWDER, FOR SOLUTION INTRAVENOUS at 09:03

## 2018-03-31 RX ADMIN — INSULIN LISPRO 9 UNITS: 100 INJECTION, SOLUTION INTRAVENOUS; SUBCUTANEOUS at 17:48

## 2018-03-31 RX ADMIN — INSULIN LISPRO 15 UNITS: 100 INJECTION, SOLUTION INTRAVENOUS; SUBCUTANEOUS at 13:12

## 2018-03-31 RX ADMIN — POLYETHYLENE GLYCOL 3350 17 G: 17 POWDER, FOR SOLUTION ORAL at 09:04

## 2018-03-31 RX ADMIN — GUAIFENESIN 100 MG: 200 SOLUTION ORAL at 14:35

## 2018-03-31 RX ADMIN — INSULIN LISPRO 15 UNITS: 100 INJECTION, SOLUTION INTRAVENOUS; SUBCUTANEOUS at 09:04

## 2018-03-31 RX ADMIN — INSULIN LISPRO 3 UNITS: 100 INJECTION, SOLUTION INTRAVENOUS; SUBCUTANEOUS at 13:13

## 2018-03-31 RX ADMIN — ATORVASTATIN CALCIUM 40 MG: 20 TABLET, FILM COATED ORAL at 00:41

## 2018-03-31 RX ADMIN — ACETAMINOPHEN 650 MG: 325 TABLET ORAL at 18:18

## 2018-03-31 RX ADMIN — CLOTRIMAZOLE: 10 CREAM TOPICAL at 09:00

## 2018-03-31 RX ADMIN — ASPIRIN 81 MG 81 MG: 81 TABLET ORAL at 09:04

## 2018-03-31 RX ADMIN — PIPERACILLIN SODIUM,TAZOBACTAM SODIUM 3.38 G: 3; .375 INJECTION, POWDER, FOR SOLUTION INTRAVENOUS at 15:41

## 2018-03-31 RX ADMIN — PIPERACILLIN SODIUM,TAZOBACTAM SODIUM 3.38 G: 3; .375 INJECTION, POWDER, FOR SOLUTION INTRAVENOUS at 21:47

## 2018-03-31 NOTE — PROGRESS NOTES
Hospitalist Progress Note    Patient: Trevin Cifuentes MRN: [id number]  CSN: [id number]    YOB: 1945  Age: 71 y.o. Sex: male    DOA: 3/21/2018 LOS:  LOS: 9 days                Assessment/Plan     Patient Active Problem List   Diagnosis Code    Diabetic foot infection ([facility] E11.69, L08.9    HTN (hypertension) I10    Diabetic neuropathy ([facility] E11.40    Diabetic foot ulcer ([facility] E11.621, L97.509    Diabetes ([facility] E11.9    CAD (coronary artery disease) I25.10    Cellulitis L03.90    Osteomyelitis of right foot ([facility] M86.9            72 yo male admitted for right foot cellulitis, foot ulcer    Cellulitis/osteomyelitis/diabetic foot infection, Right - failed outpatient antibiotics. Blood cultures with no growth  s/p I&D right foot and resection of infected bone 03/23/2018. Wound cultures with polymicrobial growth. Seen by ID  Antibiotics - vancomycin and zosyn per ID recommendations. Discussed with ID, do not recommend long term antibiotics if the bone margins are clear. Discussed with Dr. Natali Forbes, pathology report did not mention about margins. However he feels patient needs 6 weeks of antibiotics    Patient does not want home health dressing changes. He is also not compliant with non weight baring on his right foot. Discussed compliance and dressing changes concerns with patient. He still does not want home health wound care.      DM - continue home insulin and SSI.     HTN - monitor BP     CAD - continue with aspirin     DVT prophylaxis    Was going to discharge patient today, he complained of chest pain, left sided, on and off, no chest pain when I saw patient. No radiation, not associated with SOB, N/V, diaphoresis. EKG NSR, T wave abnormality inferior leads. Non specific ST changes. Initial troponin 0.54. Discussed with Dr. Jony Graves, patient has history of CAD, CABG, stent placement. Started on therapeutic lovenox, coreg. Already on aspirin. Echo ordered.  Dr. Jony Graves will see the patient. Disposition : tomorrow    Review of systems  General: No fevers or chills. Cardiovascular: No. No palpitations. See above  Pulmonary: No shortness of breath. Gastrointestinal: No nausea, vomiting. Physical Exam:  General: Awake, cooperative, no acute distress    HEENT: NC, Atraumatic. PERRLA, anicteric sclerae. Lungs: CTA Bilaterally. No Wheezing/Rhonchi/Rales. Heart:  Regular  rhythm,  No murmur, No Rubs, No Gallops  Abdomen: Soft, Non distended, Non tender.  +Bowel sounds,   Extremities: Right foot with dressing. diabetic foot ulcer right heel and bilateral MTP joint. Open wound right mid foot plantar aspect, with drainage. Psych:   Not anxious or agitated. Neurologic:  No acute neurological deficit. Vital signs/Intake and Output:  Visit Vitals    /66 (BP 1 Location: Left arm, BP Patient Position: At rest)    Pulse 86    Temp 98.3 °F (36.8 °C)    Resp 18    Ht 6' 1\" (1.854 m)    Wt 124.1 kg (273 lb 9.5 oz)    SpO2 96%    BMI 36.1 kg/m2     Current Shift:     Last three shifts:  03/29 0701 - 03/30 1900  In: 8959 [P.O.:1080; I.V.:700]  Out: 1801 [Urine:2275]            Labs: Results:       Chemistry Recent Labs      03/29/18   0513  03/28/18   0358   GLU  133*  113*   NA  145  146*   K  3.7  4.2   CL  109*  111*   CO2  28  30   BUN  10  10   CREA  0.97  0.94   CA  8.3*  8.4*   AGAP  8  5   BUCR  10*  11*      CBC w/Diff No results for input(s): WBC, RBC, HGB, HCT, PLT, GRANS, LYMPH, EOS, HGBEXT, HCTEXT, PLTEXT, HGBEXT, HCTEXT, PLTEXT in the last 72 hours. Cardiac Enzymes Recent Labs      03/30/18   1900  03/30/18   0958   CPK  30*  27*   CKND1  4.0  4.4*      Coagulation Recent Labs      03/30/18   0310   PTP  13.9   INR  1.1       Lipid Panel No results found for: CHOL, CHOLPOCT, CHOLX, CHLST, CHOLV, 722716, HDL, LDL, LDLC, DLDLP, 630206, VLDLC, VLDL, TGLX, TRIGL, TRIGP, TGLPOCT, CHHD, CHHDX   BNP No results for input(s): BNPP in the last 72 hours.    Liver Enzymes No results for input(s): TP, ALB, TBIL, AP, SGOT, GPT in the last 72 hours.     No lab exists for component: DBIL   Thyroid Studies No results found for: T4, T3U, TSH, TSHEXT, TSHEXT     Procedures/imaging: see electronic medical records for all procedures/Xrays and details which were not copied into this note but were reviewed prior to creation of Plan

## 2018-03-31 NOTE — PROGRESS NOTES
2031 Bedside and Verbal shift change report given to Gio Couch Rn (oncoming nurse) by Nataly Mueller RN (offgoing nurse). Report included the following information SBAR.

## 2018-03-31 NOTE — ROUTINE PROCESS
2015 Bedside verbal shift change report received from Marylou Mejia RN(offgoing nurse) given to iGna Costa RN(oncoming nurse). Report included SBAR,MAR,KARDEX,TELE,I/O    0748 Bedside verbal shift change report given to Alfonso Ponce RN(oncoming nurse) by Gina Costa RN(offgoing nurse).  Report included SBAR,MAR,KARDEX,TELE,I/O

## 2018-03-31 NOTE — CONSULTS
[facility] Consult Note      Patient: Curtis Mena MRN: [id number]  SSN: [id number]    YOB: 1945  Age: 71 y.o. Sex: male    Date of Consultation: 03/30/2018  Referring Physician: Juvencio Crowe MD  Reason for Consultation: Chest pain    Chief complain: Chest pain    HPI: 69-year-old gentleman being managed for RIGHT foot osteoarthritis. Cardiology consult called for episode of chest pain. History obtained from patient. Patient is not good historian. He woke up from sleep with chest pain. Chest pain was on left-sided, pressure-like, not sure how long it lasted. Denies any associated symptoms with chest pain. Denies any similar episode in the past.  He doesn't walk due to foot oosteomyelitis. Denies any dizziness, palpitation, presyncope or syncope. He had two-vessel CABG in 1998. In 2012 he had 2 stent placement. He also has history of peripheral vascular disease and history of lower extremity PTA in the past.   He denies any history of smoking. He mentioned that he did not have any symptoms prior to CABG.     Past Medical History:   Diagnosis Date    Asthma     CAD (coronary artery disease)     Diabetes ([facility]     Diabetic foot ulcer ([facility]     Diabetic neuropathy ([facility]     HTN (hypertension)      Past Surgical History:   Procedure Laterality Date    CARDIAC SURG PROCEDURE UNLIST  1998    bypass    HX ORTHOPAEDIC       Current Facility-Administered Medications   Medication Dose Route Frequency    HYDROcodone-acetaminophen (NORCO) 5-325 mg per tablet 1 Tab  1 Tab Oral Q6H PRN    carvedilol (COREG) tablet 3.125 mg  3.125 mg Oral BID WITH MEALS    enoxaparin (LOVENOX) injection 120 mg  120 mg SubCUTAneous Q12H    atorvastatin (LIPITOR) tablet 40 mg  40 mg Oral QHS    clotrimazole (LOTRIMIN) 1 % cream   Topical BID    piperacillin-tazobactam (ZOSYN) 3.375 g in 0.9% sodium chloride (MBP/ADV) 100 mL MBP  3.375 g IntraVENous Q6H    vancomycin (VANCOCIN) 1750 mg in  ml infusion 1,750 mg IntraVENous Q12H    sodium hypochlorite (QUARTER STRENGTH DAKIN'S) 0.125% irrigation (bottle)   Topical Q MON, WED & SUN    insulin lispro (HUMALOG) injection 15 Units  15 Units SubCUTAneous TID WITH MEALS    guaiFENesin (ROBITUSSIN) 100 mg/5 mL oral liquid 100 mg  100 mg Oral Q4H PRN    insulin lispro (HUMALOG) injection   SubCUTAneous AC&HS    naloxone (NARCAN) injection 0.4 mg  0.4 mg IntraVENous PRN    aspirin chewable tablet 81 mg  81 mg Oral DAILY    sodium chloride (NS) flush 5-10 mL  5-10 mL IntraVENous PRN    acetaminophen (TYLENOL) tablet 650 mg  650 mg Oral Q4H PRN    glucose chewable tablet 16 g  16 g Oral PRN    glucagon (GLUCAGEN) injection 1 mg  1 mg IntraMUSCular PRN    dextrose (D50W) injection syrg 25 g  50 mL IntraVENous PRN    Vancomycin pharamcy to dose  1 Each Other Rx Dosing/Monitoring    insulin glargine (LANTUS) injection 30 Units  30 Units SubCUTAneous DAILY    polyethylene glycol (MIRALAX) packet 17 g  17 g Oral DAILY       Allergies and Intolerances: Allergies   Allergen Reactions    Doxycycline Anaphylaxis    Bactrim [Sulfamethoprim Ds] Diarrhea    Keflex [Cephalexin] Diarrhea       Family History:   History reviewed. No pertinent family history. Social History:   He  reports that he has never smoked. He has never used smokeless tobacco.  He  reports that he does not drink alcohol. Review of Systems:     Gen: No fever, chills, malaise, weight loss/gain. Heent: No headache, rhinorrhea, epistaxis, ear pain, hearing loss, sinus pain, neck pain/stiffness, sore throat. Heart: Positive chest pain, No palpitations, shortness of breath, pnd, or orthopnea. Resp: No cough, hemoptysis, wheezing and dyspnea. GI: No nausea, vomiting, diarrhea, constipation, melena or hematochezia. : No urinary obstruction, dysuria or hematuria. Derm: No rash, new skin lesion or pruritis. Musc/skeletal: no bone or joint complains.   Vasc: No edema, cyanosis or claudication. Endo: No heat/cold intolerance, no polyuria,polydipsia or polyphagia. Neuro: No unilateral weakness, numbness, tingling. No seizures. Heme: No easy bruising or bleeding. Physical:   Patient Vitals for the past 6 hrs:   Temp Pulse Resp BP SpO2   03/30/18 1926 98.3 °F (36.8 °C) 86 18 136/66 96 %         Exam:   General Appearance: Comfortable, not using accessory muscles of respiration. HEENT: SORIN. HEAD: Atraumatic  NECK: No JVD, no thyroidomeglay. CAROTIDS: No bruit  LUNGS: Clear bilaterally. HEART: S1+S2 audible, no murmur, no pericardial rub. ABD: Non-tender, BS Audible    EXT: bilateral foot dressing +  PSYCHIATRIC EXAM: Mood is appropriate. MUSCULOSKELETAL: Grossly no joint deformity. NEUROLOGICAL: AAO times 3, Motor and sensory sytem intact.     Review of Data:   LABS:   Lab Results   Component Value Date/Time    WBC 6.1 03/25/2018 05:25 AM    HGB 10.2 (L) 03/25/2018 05:25 AM    HCT 30.9 (L) 03/25/2018 05:25 AM    PLATELET 156 37/62/6256 05:25 AM     Lab Results   Component Value Date/Time    Sodium 145 03/29/2018 05:13 AM    Potassium 3.7 03/29/2018 05:13 AM    Chloride 109 (H) 03/29/2018 05:13 AM    CO2 28 03/29/2018 05:13 AM    Glucose 133 (H) 03/29/2018 05:13 AM    BUN 10 03/29/2018 05:13 AM    Creatinine 0.97 03/29/2018 05:13 AM     No results found for: CHOL, CHOLX, CHLST, CHOLV, HDL, LDL, LDLC, DLDLP, TGLX, TRIGL, TRIGP  No results found for: GPT  Lab Results   Component Value Date/Time    Hemoglobin A1c 11.3 (H) 03/22/2018 11:02 AM         Cardiology Procedures:   Results for orders placed or performed during the hospital encounter of 03/21/18   EKG, 12 LEAD, INITIAL   Result Value Ref Range    Ventricular Rate 76 BPM    Atrial Rate 75 BPM    QRS Duration 92 ms    Q-T Interval 392 ms    QTC Calculation (Bezet) 441 ms    Calculated R Axis 60 degrees    Calculated T Axis -40 degrees    Diagnosis       Sinus rhythm  T wave abnormality, consider inferior ischemia  Nonspecific ST abnormality  Abnormal ECG  Confirmed by Abebe Magaña MD, Jaya Tejeda ([id number]) on 3/30/2018 11:40:35 AM             Impression / Plan:    Patient Active Problem List   Diagnosis Code    Diabetic foot infection ([facility] E11.69, L08.9    HTN (hypertension) I10    Diabetic neuropathy ([facility]) E11.40    Diabetic foot ulcer ([facility] E11.621, L97.509    Diabetes ([facility] E11.9    CAD (coronary artery disease) I25.10    Cellulitis L03.90    Osteomyelitis of right foot ([facility]) M86.9     CAD history of CABG and PCI  Chest pain  Abnormal troponin    Continue aspirin, beta blocker and full dose of Lovenox  Add Lipitor 40 mg by mouth daily at bedtime  Serial EKG and cardiac enzymes every 6-8 hours ×3  Echocardiogram  Further cardiac workup as clinically indicated  Continue management as per hospital medicine. Plan discussed with patient and family member at bedside.       Signed By: More Rosario MD     March 30, 2018

## 2018-03-31 NOTE — PROGRESS NOTES
Problem: Mobility Impaired (Adult and Pediatric)  Goal: *Acute Goals and Plan of Care (Insert Text)  In 1-7 days pt will be able to perform:  ST.  Bed mobility:  Rolling L to R to L modified independent for positioning. 2.  Supine to sit to supine S with HR for meals. 3.  Sit to stand to sit S with knee scooter in prep for ambulation. LT.  Gait:  Ambulate >100ft S with knee scooter NWB, for home/community mobility. 2.  Stair Negotiation:  Ascend/descend >3 steps CGA with HR NWB on R for home entry. 3.  Patient/Family Education:  Patient/family to be independent with HEP for follow-up care and safe discharge. Outcome: Progressing Towards Goal  physical Therapy TREATMENT    Patient: Shiela Clemons (89 y.o. male)  Date: 3/31/2018  Diagnosis: Diabetic foot infection ([address]  infected right foot Diabetic foot infection ([address]  Procedure(s) (LRB):  RIGHT FOOT INCISION/DRAINAGE, BONE BIOPSY, AND RESECTION OF INFECTED BONE RIGHT FOOT (Right) 8 Days Post-Op  Precautions: Fall   Chart, physical therapy assessment, plan of care and goals were reviewed. ASSESSMENT:  Pt seen sitting on the EOB prior to session. Pt reluctant to participate in session but w/ encouragement and motivation pt willing to participate w/ minimal performing therex activity. Pt required frequent rest breaks after each activity as pt c/o decrease activity tolerance. Unable to perform all therex as nursing staff entered the room to change pt's dressing. Pt left in supine after session, w/ nursing staff present. Progression toward goals:  []      Improving appropriately and progressing toward goals  [x]      Improving slowly and progressing toward goals  []      Not making progress toward goals and plan of care will be adjusted     PLAN:  Patient continues to benefit from skilled intervention to address the above impairments. Continue treatment per established plan of care.   Discharge Recommendations:  [facility] Equipment Recommendations for Discharge: TBD by rehab     SUBJECTIVE:   Patient stated If you think we can do anything, then fine I will work with you.     OBJECTIVE DATA SUMMARY:   Critical Behavior:  Neurologic State: Alert, Appropriate for age  Orientation Level: Oriented X4  Cognition: Follows commands  Safety/Judgement: Awareness of environment, Fall prevention, Good awareness of safety precautions, Insight into deficits  Functional Mobility Training:  Bed Mobility:  Sit to Supine: Supervision;Contact guard assistance  Scooting: Supervision  Balance:  Sitting: Intact  Ambulation/Gait Training:  Right Side Weight Bearing: Non-weight bearing  Interventions: Safety awareness training  Therapeutic Exercises:       EXERCISE   Sets   Reps   Active Active Assist   Passive Self ROM   Comments   Ankle Pumps    [] [] [] []    Quad Sets/Glut Sets   [] [] [] []    Hamstring Sets   [] [] [] []    Short Arc Quads   [] [] [] []    Heel Slides   [] [] [] []    Straight Leg Raises   [] [] [] []    Hip Abd/Add 2 10 [x] [] [] [] Hold for 5 secs   Long Arc Quads 2 20 [x] [] [] [] Hold for a second   Seated Marching   [] [] [] []    Standing Marching   [] [] [] []       [] [] [] []      Pain:  Pain Scale 1: Numeric (0 - 10)  Pain Intensity 1: 0  Activity Tolerance:   Fair  Please refer to the flowsheet for vital signs taken during this treatment.   After treatment:   [] Patient left in no apparent distress sitting up in chair  [x] Patient left in no apparent distress in bed  [x] Call bell left within reach  [x] Nursing notified  [] Caregiver present  [] Bed alarm activated      Kendrick Han PT   Time Calculation: 15 mins

## 2018-03-31 NOTE — PROGRESS NOTES
Hospitalist Progress Note    Patient: Sierra Mcginnis MRN: [id number]  CSN: [id number]    YOB: 1945  Age: 71 y.o. Sex: male    DOA: 3/21/2018 LOS:  LOS: 10 days          Chief Complaint:dm foot infection          Assessment/Plan       -DM foot infection / osteomyelitis. -Chest pain with abnml CE yesterday. -RLE swelling. Duplex pending. -DM with neuropathy.  -HTN. -Hx CAD. Stents x 2 2012.  -Peripheral Vascular disease.  -Hyperlipidema. -Morbidly obese. Check duplex. R/O DVT. Await cardiac recs for today. Abx continue. Wound care. Subjective:    C/o RLE swelling x 48 hrs. No chest pain now. Seen yesterday by Dr. Eboni Avendano for chest pain. CE mildly abnml. Diabetic with complications. Review of systems:    Constitutional: denies fevers, chills, myalgias  Respiratory: denies SOB, cough  Cardiovascular: denies chest pain, palpitations  Gastrointestinal: denies nausea, vomiting, diarrhea      Vital signs/Intake and Output:  Visit Vitals    /41 (BP 1 Location: Left arm, BP Patient Position: At rest)    Pulse 75    Temp 98.3 °F (36.8 °C)    Resp 19    Ht 6' 1\" (1.854 m)    Wt 123.9 kg (273 lb 2.4 oz)    SpO2 97%    BMI 36.04 kg/m2     Current Shift:  03/31 0701 - 03/31 1900  In: 250 [P.O.:250]  Out: 450 [Urine:450]  Last three shifts:  03/29 1901 - 03/31 0700  In: 1560 [P.O.:1560]  Out: 3125 [Urine:3125]    Exam:    General: nad  Head/Neck:mmm  CVS:s1s2 rrr  Lungs:CTABL  Abdomen: Soft, bs+   Extremities: No edema.                 Labs: Results:       Chemistry Recent Labs      03/31/18   0241  03/29/18   0513   GLU  146*  133*   NA  148*  145   K  3.7  3.7   CL  114*  109*   CO2  29  28   BUN  10  10   CREA  0.79  0.97   CA  8.0*  8.3*   AGAP  5  8   BUCR  13  10*   AP  70   --    TP  6.0*   --    ALB  2.2*   --    GLOB  3.8   --    AGRAT  0.6*   --       CBC w/Diff No results for input(s): WBC, RBC, HGB, HCT, PLT, GRANS, LYMPH, EOS, HGBEXT, HCTEXT, PLTEXT in the last 72 hours. Cardiac Enzymes Recent Labs      03/31/18   0241  03/30/18   1900   CPK  20*  30*   CKND1  CALCULATION NOT PERFORMED WHEN RESULT IS BELOW LINEAR LIMIT  4.0      Coagulation Recent Labs      03/30/18   0310   PTP  13.9   INR  1.1       Lipid Panel No results found for: CHOL, CHOLPOCT, CHOLX, CHLST, CHOLV, 597428, HDL, LDL, LDLC, DLDLP, 011656, VLDLC, VLDL, TGLX, TRIGL, TRIGP, TGLPOCT, CHHD, CHHDX   BNP No results for input(s): BNPP in the last 72 hours.    Liver Enzymes Recent Labs      03/31/18   0241   TP  6.0*   ALB  2.2*   AP  70   SGOT  17      Thyroid Studies No results found for: T4, T3U, TSH, TSHEXT     Procedures/imaging: see electronic medical records for all procedures/Xrays and details which were not copied into this note but were reviewed prior to creation of Ene Shepard MD

## 2018-03-31 NOTE — PROGRESS NOTES
2233 Bedside report received from DANETTE Gutierrez RN.    1392 Paged vascular tech. [address] [facility] Paged Dr. Marisela Lopez.     8324 Paged Dr. Sandra Owens. 0993 Bedside and Verbal shift change report given to Shawn Willis RN (oncoming nurse) by Bethany Gandhi RN   (offgoing nurse). Report included the following information SBAR, Kardex, Procedure Summary, Intake/Output, MAR, Recent Results and Med Rec Status.

## 2018-03-31 NOTE — PROGRESS NOTES
56 - Received report from Liz Cintron RN(offgoing nurse). Assumed care of PT. PT assessed. PT A&Ox4. PT is safe in BED. Wound left and right foot(location) with ace wrap + kerlex(dressing). Bed in low. Bedrails x3. Pain is 0(number) out of 10. PT oriented to room & given instructions regarding call bell, incentive spirometer, room phone, medications, and pain medications with potential side effects. PT encouraged to use call bell. Phone & call bell left in reach of PT. BOX sinus rhythm. 1920 - Bedside and Verbal shift change report given to Gilberto Botello RN (oncoming nurse) by Tani Cabrera RN (offgoing nurse). Report included the following information SBAR, Kardex, Procedure Summary, Intake/Output, MAR, Accordion, Recent Results and Med Rec Status.

## 2018-03-31 NOTE — PROGRESS NOTES
Per MD Megan Dorantes, pt unable to dc today. Wong Zimmer @ [facility] made aware. Pt has been accepted at [facility] once medically stable. (C/ Fred Neal at [address] [phone] for report). Please include all hard scripts for controlled substances, med rec and dc summary in packet. Please medicate for pain prior to dc if possible and needed to help offset delay when patient first arrives to facility.

## 2018-03-31 NOTE — PROGRESS NOTES
Problem: Mobility Impaired (Adult and Pediatric)  Goal: *Acute Goals and Plan of Care (Insert Text)  In 1-7 days pt will be able to perform:  ST.  Bed mobility:  Rolling L to R to L modified independent for positioning. 2.  Supine to sit to supine S with HR for meals. 3.  Sit to stand to sit S with knee scooter in prep for ambulation. LT.  Gait:  Ambulate >100ft S with knee scooter NWB, for home/community mobility. 2.  Stair Negotiation:  Ascend/descend >3 steps CGA with HR NWB on R for home entry. 3.  Patient/Family Education:  Patient/family to be independent with HEP for follow-up care and safe discharge. Outcome: Progressing Towards Goal  Pt refused PT due to:  []  Nausea/vomiting  []  Eating  []  Pain  [x]  Pt lethargic, Pt reports that he is too tired to participate, despite motivation from this PT, pt reports to try again at another time. []  Off Unit  Will f/u later as schedule allows. Thank you.   Sheri Rojas, PT

## 2018-04-01 LAB
ANION GAP SERPL CALC-SCNC: 4 MMOL/L (ref 3–18)
ATRIAL RATE: 89 BPM
BUN SERPL-MCNC: 9 MG/DL (ref 7–18)
BUN/CREAT SERPL: 9 (ref 12–20)
CALCIUM SERPL-MCNC: 8 MG/DL (ref 8.5–10.1)
CALCULATED P AXIS, ECG09: 55 DEGREES
CALCULATED R AXIS, ECG10: 30 DEGREES
CALCULATED T AXIS, ECG11: -23 DEGREES
CHLORIDE SERPL-SCNC: 109 MMOL/L (ref 100–108)
CO2 SERPL-SCNC: 29 MMOL/L (ref 21–32)
CREAT SERPL-MCNC: 1.02 MG/DL (ref 0.6–1.3)
DATE LAST DOSE: NORMAL
DIAGNOSIS, 93000: NORMAL
GLUCOSE BLD STRIP.AUTO-MCNC: 155 MG/DL (ref 70–110)
GLUCOSE BLD STRIP.AUTO-MCNC: 210 MG/DL (ref 70–110)
GLUCOSE BLD STRIP.AUTO-MCNC: 240 MG/DL (ref 70–110)
GLUCOSE BLD STRIP.AUTO-MCNC: 257 MG/DL (ref 70–110)
GLUCOSE SERPL-MCNC: 151 MG/DL (ref 74–99)
P-R INTERVAL, ECG05: 242 MS
POTASSIUM SERPL-SCNC: 3.8 MMOL/L (ref 3.5–5.5)
Q-T INTERVAL, ECG07: 364 MS
QRS DURATION, ECG06: 94 MS
QTC CALCULATION (BEZET), ECG08: 442 MS
REPORTED DOSE,DOSE: NORMAL UNITS
REPORTED DOSE/TIME,TMG: 900
SODIUM SERPL-SCNC: 142 MMOL/L (ref 136–145)
VANCOMYCIN TROUGH SERPL-MCNC: 14.7 UG/ML (ref 10–20)
VENTRICULAR RATE, ECG03: 89 BPM

## 2018-04-01 PROCEDURE — 93005 ELECTROCARDIOGRAM TRACING: CPT

## 2018-04-01 PROCEDURE — 74011000258 HC RX REV CODE- 258: Performed by: HOSPITALIST

## 2018-04-01 PROCEDURE — 74011636637 HC RX REV CODE- 636/637: Performed by: PHYSICIAN ASSISTANT

## 2018-04-01 PROCEDURE — 82962 GLUCOSE BLOOD TEST: CPT

## 2018-04-01 PROCEDURE — 74011250637 HC RX REV CODE- 250/637: Performed by: HOSPITALIST

## 2018-04-01 PROCEDURE — 74011250637 HC RX REV CODE- 250/637: Performed by: INTERNAL MEDICINE

## 2018-04-01 PROCEDURE — 80048 BASIC METABOLIC PNL TOTAL CA: CPT | Performed by: HOSPITALIST

## 2018-04-01 PROCEDURE — 74011250636 HC RX REV CODE- 250/636: Performed by: HOSPITALIST

## 2018-04-01 PROCEDURE — 65660000000 HC RM CCU STEPDOWN

## 2018-04-01 PROCEDURE — 80202 ASSAY OF VANCOMYCIN: CPT | Performed by: HOSPITALIST

## 2018-04-01 PROCEDURE — 93971 EXTREMITY STUDY: CPT

## 2018-04-01 PROCEDURE — 74011636637 HC RX REV CODE- 636/637: Performed by: HOSPITALIST

## 2018-04-01 PROCEDURE — 97530 THERAPEUTIC ACTIVITIES: CPT

## 2018-04-01 RX ORDER — ISOSORBIDE DINITRATE 10 MG/1
10 TABLET ORAL 3 TIMES DAILY
Status: DISCONTINUED | OUTPATIENT
Start: 2018-04-01 | End: 2018-04-02

## 2018-04-01 RX ORDER — INSULIN LISPRO 100 [IU]/ML
18 INJECTION, SOLUTION INTRAVENOUS; SUBCUTANEOUS
Status: DISCONTINUED | OUTPATIENT
Start: 2018-04-01 | End: 2018-04-05 | Stop reason: HOSPADM

## 2018-04-01 RX ORDER — NITROGLYCERIN 0.4 MG/1
0.4 TABLET SUBLINGUAL AS NEEDED
Status: DISCONTINUED | OUTPATIENT
Start: 2018-04-01 | End: 2018-04-05 | Stop reason: HOSPADM

## 2018-04-01 RX ADMIN — PIPERACILLIN SODIUM,TAZOBACTAM SODIUM 3.38 G: 3; .375 INJECTION, POWDER, FOR SOLUTION INTRAVENOUS at 15:28

## 2018-04-01 RX ADMIN — ISOSORBIDE DINITRATE 10 MG: 10 TABLET ORAL at 15:28

## 2018-04-01 RX ADMIN — INSULIN LISPRO 6 UNITS: 100 INJECTION, SOLUTION INTRAVENOUS; SUBCUTANEOUS at 22:00

## 2018-04-01 RX ADMIN — INSULIN LISPRO 15 UNITS: 100 INJECTION, SOLUTION INTRAVENOUS; SUBCUTANEOUS at 12:26

## 2018-04-01 RX ADMIN — PIPERACILLIN SODIUM,TAZOBACTAM SODIUM 3.38 G: 3; .375 INJECTION, POWDER, FOR SOLUTION INTRAVENOUS at 08:23

## 2018-04-01 RX ADMIN — CARVEDILOL 3.12 MG: 3.12 TABLET, FILM COATED ORAL at 08:24

## 2018-04-01 RX ADMIN — INSULIN LISPRO 6 UNITS: 100 INJECTION, SOLUTION INTRAVENOUS; SUBCUTANEOUS at 12:26

## 2018-04-01 RX ADMIN — CLOTRIMAZOLE: 10 CREAM TOPICAL at 21:00

## 2018-04-01 RX ADMIN — PIPERACILLIN SODIUM,TAZOBACTAM SODIUM 3.38 G: 3; .375 INJECTION, POWDER, FOR SOLUTION INTRAVENOUS at 02:46

## 2018-04-01 RX ADMIN — INSULIN GLARGINE 30 UNITS: 100 INJECTION, SOLUTION SUBCUTANEOUS at 08:24

## 2018-04-01 RX ADMIN — Medication 10 ML: at 05:26

## 2018-04-01 RX ADMIN — INSULIN LISPRO 15 UNITS: 100 INJECTION, SOLUTION INTRAVENOUS; SUBCUTANEOUS at 08:25

## 2018-04-01 RX ADMIN — ENOXAPARIN SODIUM 120 MG: 120 INJECTION SUBCUTANEOUS at 15:28

## 2018-04-01 RX ADMIN — PIPERACILLIN SODIUM,TAZOBACTAM SODIUM 3.38 G: 3; .375 INJECTION, POWDER, FOR SOLUTION INTRAVENOUS at 22:50

## 2018-04-01 RX ADMIN — ENOXAPARIN SODIUM 120 MG: 120 INJECTION SUBCUTANEOUS at 02:46

## 2018-04-01 RX ADMIN — ATORVASTATIN CALCIUM 40 MG: 20 TABLET, FILM COATED ORAL at 22:50

## 2018-04-01 RX ADMIN — ASPIRIN 81 MG 81 MG: 81 TABLET ORAL at 08:24

## 2018-04-01 RX ADMIN — VANCOMYCIN HYDROCHLORIDE 1750 MG: 10 INJECTION, POWDER, LYOPHILIZED, FOR SOLUTION INTRAVENOUS at 09:16

## 2018-04-01 RX ADMIN — ISOSORBIDE DINITRATE 10 MG: 10 TABLET ORAL at 22:49

## 2018-04-01 RX ADMIN — INSULIN LISPRO 2 UNITS: 100 INJECTION, SOLUTION INTRAVENOUS; SUBCUTANEOUS at 08:25

## 2018-04-01 RX ADMIN — CLOTRIMAZOLE: 10 CREAM TOPICAL at 12:26

## 2018-04-01 NOTE — PROGRESS NOTES
In pt room to give pt evening medication, states that he does not want \"that shit\" and the PT and myself \"cant march on out of his room, hes done with this shit\"

## 2018-04-01 NOTE — PROGRESS NOTES
0730 Bedside report received from [facility], [address] Pt wanted to leave AMA because he was non compliant. 1900 Spoke to Dr. Cheyenne Mathias and made him aware that pt wanted to leave and he states that he can leave . 2000 Pt decided he wanted to stay. 2300 Pt started complaining of chest pain. [address] Dr. Jennifer Martinez and made him aware. He states to give nitro PRN and to keep pt NPO. Shift uneventful. Pt is stable with no signs of distress.

## 2018-04-01 NOTE — PROGRESS NOTES
Problem: Mobility Impaired (Adult and Pediatric)  Goal: *Acute Goals and Plan of Care (Insert Text)  In 1-7 days pt will be able to perform:  ST.  Bed mobility:  Rolling L to R to L modified independent for positioning. 2.  Supine to sit to supine S with HR for meals. 3.  Sit to stand to sit S with knee scooter in prep for ambulation. LT.  Gait:  Ambulate >100ft S with knee scooter NWB, for home/community mobility. 2.  Stair Negotiation:  Ascend/descend >3 steps CGA with HR NWB on R for home entry. 3.  Patient/Family Education:  Patient/family to be independent with HEP for follow-up care and safe discharge. Outcome: Progressing Towards Goal  physical Therapy TREATMENT    Patient: Ally Moe (68 y.o. male)  Date: 2018  Diagnosis: Diabetic foot infection ([address]  infected right foot Diabetic foot infection ([address]  Procedure(s) (LRB):  RIGHT FOOT INCISION/DRAINAGE, BONE BIOPSY, AND RESECTION OF INFECTED BONE RIGHT FOOT (Right) 9 Days Post-Op  Precautions: Fall   Chart, physical therapy assessment, plan of care and goals were reviewed. ASSESSMENT:  Upon arrival, pt sidelying in bed watching TV. Pt refusing to participate with PT. Attempted to encouraged patient, and provide benefits. Nurse enter to provide medication, and also provided encouragement. Pt then states, \" I don't care if I die\" pt began to put reach for his bag of clothes. Bed alarm placed on. Left room to get more nursing assistance. Pt states, \"I am leaving. \"    Progression toward goals:  []      Improving appropriately and progressing toward goals  [x]      Improving slowly and progressing toward goals  []      Not making progress toward goals and plan of care will be adjusted     PLAN:  Patient continues to benefit from skilled intervention to address the above impairments. Continue treatment per established plan of care.   Discharge Recommendations:  Rehab  Further Equipment Recommendations for Discharge:  Knee scooter and Cam boot      SUBJECTIVE:   Patient stated  I dont care about this shit    OBJECTIVE DATA SUMMARY:   Critical Behavior:  Neurologic State: Alert  Orientation Level: Oriented X4  Cognition: Follows commands  Safety/Judgement: Awareness of environment, Fall prevention, Good awareness of safety precautions, Insight into deficits  Functional Mobility Training:  Bed Mobility:  Supervision    Balance:  Sitting: Intact    Pain:  Pain Scale 1: Numeric (0 - 10)  Pain Intensity 1: 0  Activity Tolerance:   Fair     After treatment:   [] Patient left in no apparent distress sitting up in chair  [x] Patient left in no apparent distress in bed(EOB)   [x] Call bell left within reach  [x] Nursing notified  [] Caregiver present  [] Bed alarm activated      Kaila Hardy PTA   Time Calculation: 9 mins

## 2018-04-01 NOTE — PROGRESS NOTES
Cardiology Progress Note        Patient: Shiela Clemons        Sex: male          DOA: 3/21/2018  YOB: 1945      Age:  71 y.o.        LOS:  LOS: 10 days    Patient seen and examined, chart reviewed  Assessment/Plan     Patient Active Problem List   Diagnosis Code    Diabetic foot infection ([facility] E11.69, L08.9    HTN (hypertension) I10    Diabetic neuropathy ([facility] E11.40    Diabetic foot ulcer ([facility] E11.621, L97.509    Diabetes ([facility] E11.9    CAD (coronary artery disease) I25.10    Cellulitis L03.90    Osteomyelitis of right foot ([facility] M86.9      CAD H/o CABG in 1998 and PCI in 2012  Abnormal troponin    Echocardiogram revealed no wall motion on the medial normal LVEF. Troponin is down trending. No clear evidence of acute coronary syndrome. Plan:    Continue aspirin, beta blocker, statin and full dose of anticoagulation. A blood pressure permits will add small dose of nitrates. Advise about ischemia workup in view of chest pain with abnormal troponin with underlying known CAD. Offered LEFT heart catheterization, coronary angiogram plus or minus PCI versus stress test options. Patient and family member would like to go for stress test.  Will schedule patient for Lexiscan stress test.  Continue management as per hospital medicine. Plan discussed with . Subjective:    cc:  Denies any chest pain      REVIEW OF SYSTEMS:     General: No fevers or chills. Cardiovascular: No chest pain,No palpitations, No orthopnea, No PND, No leg swelling, Positive claudication  Pulmonary: No dyspnea  Gastrointestinal: No nausea, vomiting, bleeding  Neurology: No Dizziness    Objective:      Visit Vitals    /69 (BP 1 Location: Left arm, BP Patient Position: At rest)    Pulse 76    Temp 98.6 °F (37 °C)    Resp 18    Ht 6' 1\" (1.854 m)    Wt 123.9 kg (273 lb 2.4 oz)    SpO2 97%    BMI 36.04 kg/m2     Body mass index is 36.04 kg/(m^2).     Physical Exam:  General Appearance: Comfortable, not using accessory muscles of respiration. HEENT: SORIN. HEAD: Atraumatic  NECK: No JVD, no thyroidomeglay. CAROTIDS: No bruit  LUNGS: Clear bilaterally. HEART: S1+S2 audible, no murmur, no pericardial rub. ABD: Non-tender, BS Audible    NEUROLOGICAL: AAO times 3, No motor and sensory deficit.   Medication:  Current Facility-Administered Medications   Medication Dose Route Frequency    HYDROcodone-acetaminophen (NORCO) 5-325 mg per tablet 1 Tab  1 Tab Oral Q6H PRN    carvedilol (COREG) tablet 3.125 mg  3.125 mg Oral BID WITH MEALS    enoxaparin (LOVENOX) injection 120 mg  120 mg SubCUTAneous Q12H    atorvastatin (LIPITOR) tablet 40 mg  40 mg Oral QHS    clotrimazole (LOTRIMIN) 1 % cream   Topical BID    piperacillin-tazobactam (ZOSYN) 3.375 g in 0.9% sodium chloride (MBP/ADV) 100 mL MBP  3.375 g IntraVENous Q6H    vancomycin (VANCOCIN) 1750 mg in  ml infusion  1,750 mg IntraVENous Q12H    sodium hypochlorite (QUARTER STRENGTH DAKIN'S) 0.125% irrigation (bottle)   Topical Q MON, WED & SUN    insulin lispro (HUMALOG) injection 15 Units  15 Units SubCUTAneous TID WITH MEALS    guaiFENesin (ROBITUSSIN) 100 mg/5 mL oral liquid 100 mg  100 mg Oral Q4H PRN    insulin lispro (HUMALOG) injection   SubCUTAneous AC&HS    naloxone (NARCAN) injection 0.4 mg  0.4 mg IntraVENous PRN    aspirin chewable tablet 81 mg  81 mg Oral DAILY    sodium chloride (NS) flush 5-10 mL  5-10 mL IntraVENous PRN    acetaminophen (TYLENOL) tablet 650 mg  650 mg Oral Q4H PRN    glucose chewable tablet 16 g  16 g Oral PRN    glucagon (GLUCAGEN) injection 1 mg  1 mg IntraMUSCular PRN    dextrose (D50W) injection syrg 25 g  50 mL IntraVENous PRN    Vancomycin pharamcy to dose  1 Each Other Rx Dosing/Monitoring    insulin glargine (LANTUS) injection 30 Units  30 Units SubCUTAneous DAILY    polyethylene glycol (MIRALAX) packet 17 g  17 g Oral DAILY               Lab/Data Reviewed:       Recent Labs      03/31/18   1230   WBC  5.4   HGB  12.0*   HCT  36.7   PLT  173     Recent Labs      03/31/18   0241  03/29/18   0513   NA  148*  145   K  3.7  3.7   CL  114*  109*   CO2  29  28   GLU  146*  133*   BUN  10  10   CREA  0.79  0.97   CA  8.0*  8.3*       Signed By: Nadya Zelaya MD     March 31, 2018

## 2018-04-02 ENCOUNTER — APPOINTMENT (OUTPATIENT)
Dept: NUCLEAR MEDICINE | Age: 73
DRG: 629 | End: 2018-04-02
Attending: INTERNAL MEDICINE
Payer: MEDICARE

## 2018-04-02 LAB
ANION GAP SERPL CALC-SCNC: 6 MMOL/L (ref 3–18)
ATTENDING PHYSICIAN, CST07: NORMAL
BUN SERPL-MCNC: 12 MG/DL (ref 7–18)
BUN/CREAT SERPL: 12 (ref 12–20)
CALCIUM SERPL-MCNC: 8.1 MG/DL (ref 8.5–10.1)
CHLORIDE SERPL-SCNC: 109 MMOL/L (ref 100–108)
CO2 SERPL-SCNC: 28 MMOL/L (ref 21–32)
CREAT SERPL-MCNC: 1.04 MG/DL (ref 0.6–1.3)
DIAGNOSIS, 93000: NORMAL
DUKE TM SCORE RESULT, CST14: NORMAL
DUKE TREADMILL SCORE, CST13: NORMAL
ECG INTERP BEFORE EX, CST11: NORMAL
ECG INTERP DURING EX, CST12: NORMAL
ERYTHROCYTE [DISTWIDTH] IN BLOOD BY AUTOMATED COUNT: 13.6 % (ref 11.6–14.5)
FUNCTIONAL CAPACITY, CST17: NORMAL
GLUCOSE BLD STRIP.AUTO-MCNC: 149 MG/DL (ref 70–110)
GLUCOSE BLD STRIP.AUTO-MCNC: 159 MG/DL (ref 70–110)
GLUCOSE BLD STRIP.AUTO-MCNC: 206 MG/DL (ref 70–110)
GLUCOSE BLD STRIP.AUTO-MCNC: 234 MG/DL (ref 70–110)
GLUCOSE SERPL-MCNC: 205 MG/DL (ref 74–99)
HCT VFR BLD AUTO: 30.8 % (ref 36–48)
HGB BLD-MCNC: 9.9 G/DL (ref 13–16)
KNOWN CARDIAC CONDITION, CST08: NORMAL
MAX. DIASTOLIC BP, CST04: 67 MMHG
MAX. HEART RATE, CST05: 91 BPM
MAX. SYSTOLIC BP, CST03: 144 MMHG
MCH RBC QN AUTO: 30.4 PG (ref 24–34)
MCHC RBC AUTO-ENTMCNC: 32.1 G/DL (ref 31–37)
MCV RBC AUTO: 94.5 FL (ref 74–97)
OVERALL BP RESPONSE TO EXERCISE, CST16: NORMAL
OVERALL HR RESPONSE TO EXERCISE, CST15: NORMAL
PEAK EX METS, CST10: 1 METS
PLATELET # BLD AUTO: 210 K/UL (ref 135–420)
PMV BLD AUTO: 9.8 FL (ref 9.2–11.8)
POTASSIUM SERPL-SCNC: 3.9 MMOL/L (ref 3.5–5.5)
PROTOCOL NAME, CST01: NORMAL
RBC # BLD AUTO: 3.26 M/UL (ref 4.7–5.5)
SODIUM SERPL-SCNC: 143 MMOL/L (ref 136–145)
TEST INDICATION, CST09: NORMAL
WBC # BLD AUTO: 7 K/UL (ref 4.6–13.2)

## 2018-04-02 PROCEDURE — 74011250637 HC RX REV CODE- 250/637: Performed by: HOSPITALIST

## 2018-04-02 PROCEDURE — 74011250636 HC RX REV CODE- 250/636: Performed by: HOSPITALIST

## 2018-04-02 PROCEDURE — 74011000258 HC RX REV CODE- 258: Performed by: HOSPITALIST

## 2018-04-02 PROCEDURE — 74011636637 HC RX REV CODE- 636/637: Performed by: HOSPITALIST

## 2018-04-02 PROCEDURE — 74011250636 HC RX REV CODE- 250/636: Performed by: INTERNAL MEDICINE

## 2018-04-02 PROCEDURE — 74011250637 HC RX REV CODE- 250/637: Performed by: INTERNAL MEDICINE

## 2018-04-02 PROCEDURE — 74011636637 HC RX REV CODE- 636/637: Performed by: PHYSICIAN ASSISTANT

## 2018-04-02 PROCEDURE — A9500 TC99M SESTAMIBI: HCPCS

## 2018-04-02 PROCEDURE — 82962 GLUCOSE BLOOD TEST: CPT

## 2018-04-02 PROCEDURE — 97164 PT RE-EVAL EST PLAN CARE: CPT

## 2018-04-02 PROCEDURE — 65660000000 HC RM CCU STEPDOWN

## 2018-04-02 PROCEDURE — 80048 BASIC METABOLIC PNL TOTAL CA: CPT | Performed by: HOSPITALIST

## 2018-04-02 PROCEDURE — 93017 CV STRESS TEST TRACING ONLY: CPT | Performed by: INTERNAL MEDICINE

## 2018-04-02 PROCEDURE — 85027 COMPLETE CBC AUTOMATED: CPT | Performed by: HOSPITALIST

## 2018-04-02 PROCEDURE — 74011636637 HC RX REV CODE- 636/637: Performed by: INTERNAL MEDICINE

## 2018-04-02 RX ORDER — ISOSORBIDE DINITRATE 20 MG/1
20 TABLET ORAL 3 TIMES DAILY
Status: DISCONTINUED | OUTPATIENT
Start: 2018-04-02 | End: 2018-04-05

## 2018-04-02 RX ORDER — HEPARIN SODIUM (PORCINE) LOCK FLUSH IV SOLN 100 UNIT/ML 100 UNIT/ML
50 SOLUTION INTRAVENOUS
Status: DISCONTINUED | OUTPATIENT
Start: 2018-04-02 | End: 2018-04-02 | Stop reason: SDUPTHER

## 2018-04-02 RX ORDER — HEPARIN 100 UNIT/ML
250 SYRINGE INTRAVENOUS
Status: COMPLETED | OUTPATIENT
Start: 2018-04-02 | End: 2018-04-02

## 2018-04-02 RX ADMIN — CLOTRIMAZOLE: 10 CREAM TOPICAL at 22:44

## 2018-04-02 RX ADMIN — CARVEDILOL 3.12 MG: 3.12 TABLET, FILM COATED ORAL at 18:17

## 2018-04-02 RX ADMIN — VANCOMYCIN HYDROCHLORIDE 1750 MG: 10 INJECTION, POWDER, LYOPHILIZED, FOR SOLUTION INTRAVENOUS at 12:05

## 2018-04-02 RX ADMIN — INSULIN LISPRO 18 UNITS: 100 INJECTION, SOLUTION INTRAVENOUS; SUBCUTANEOUS at 18:17

## 2018-04-02 RX ADMIN — ATORVASTATIN CALCIUM 40 MG: 20 TABLET, FILM COATED ORAL at 22:42

## 2018-04-02 RX ADMIN — ISOSORBIDE DINITRATE 10 MG: 10 TABLET ORAL at 15:09

## 2018-04-02 RX ADMIN — INSULIN LISPRO 3 UNITS: 100 INJECTION, SOLUTION INTRAVENOUS; SUBCUTANEOUS at 07:11

## 2018-04-02 RX ADMIN — INSULIN LISPRO 6 UNITS: 100 INJECTION, SOLUTION INTRAVENOUS; SUBCUTANEOUS at 22:42

## 2018-04-02 RX ADMIN — INSULIN GLARGINE 30 UNITS: 100 INJECTION, SOLUTION SUBCUTANEOUS at 10:48

## 2018-04-02 RX ADMIN — PIPERACILLIN SODIUM,TAZOBACTAM SODIUM 3.38 G: 3; .375 INJECTION, POWDER, FOR SOLUTION INTRAVENOUS at 15:09

## 2018-04-02 RX ADMIN — ENOXAPARIN SODIUM 120 MG: 120 INJECTION SUBCUTANEOUS at 15:09

## 2018-04-02 RX ADMIN — PIPERACILLIN SODIUM,TAZOBACTAM SODIUM 3.38 G: 3; .375 INJECTION, POWDER, FOR SOLUTION INTRAVENOUS at 03:33

## 2018-04-02 RX ADMIN — INSULIN LISPRO 18 UNITS: 100 INJECTION, SOLUTION INTRAVENOUS; SUBCUTANEOUS at 12:55

## 2018-04-02 RX ADMIN — PIPERACILLIN SODIUM,TAZOBACTAM SODIUM 3.38 G: 3; .375 INJECTION, POWDER, FOR SOLUTION INTRAVENOUS at 10:47

## 2018-04-02 RX ADMIN — ENOXAPARIN SODIUM 120 MG: 120 INJECTION SUBCUTANEOUS at 02:31

## 2018-04-02 RX ADMIN — ISOSORBIDE DINITRATE 20 MG: 20 TABLET ORAL at 22:42

## 2018-04-02 RX ADMIN — GUAIFENESIN 100 MG: 200 SOLUTION ORAL at 18:17

## 2018-04-02 RX ADMIN — Medication 250 UNITS: at 09:58

## 2018-04-02 RX ADMIN — REGADENOSON 0.4 MG: 0.08 INJECTION, SOLUTION INTRAVENOUS at 09:50

## 2018-04-02 RX ADMIN — PIPERACILLIN SODIUM,TAZOBACTAM SODIUM 3.38 G: 3; .375 INJECTION, POWDER, FOR SOLUTION INTRAVENOUS at 22:40

## 2018-04-02 RX ADMIN — VANCOMYCIN HYDROCHLORIDE 1750 MG: 10 INJECTION, POWDER, LYOPHILIZED, FOR SOLUTION INTRAVENOUS at 00:40

## 2018-04-02 RX ADMIN — SODIUM HYPOCHLORITE: 1.25 SOLUTION TOPICAL at 17:00

## 2018-04-02 RX ADMIN — INSULIN LISPRO 6 UNITS: 100 INJECTION, SOLUTION INTRAVENOUS; SUBCUTANEOUS at 18:17

## 2018-04-02 RX ADMIN — CARVEDILOL 3.12 MG: 3.12 TABLET, FILM COATED ORAL at 12:03

## 2018-04-02 NOTE — PROGRESS NOTES
Bedside and Verbal shift change report given to [address] [place] [address] (oncoming nurse) by Susie June RN (offgoing nurse). Report included the following information SBAR and Kardex.

## 2018-04-02 NOTE — PROGRESS NOTES
D/c plan: anticipate rehab  Josefina Long, [address] called [facility] [phone] [facility] they will not be supplying the cam boot but they will bring the knee walker and they will call when they get here with the knee walker. Cobb Kitchen  will notify  in need of the cam boot. Telephone call with wife she informed cm she is unsure if patient is ready for d/c due tohaving some chest pain. Wife also states she is just Warning cm that patient \" Struck an attitude with everyone on Saturday and has the bad attitude since then\" per wife. SNF had accepted patient on Saturday. If patient is medically clear and has cam boot and knee walker in place  RN will need to make transportation arrangements  RN please call report to MICHI/ Fred Neal at [address] [place] [address]. [phone] for report. Please include all hard scripts for controlled substances, med rec and dc summary in packet. Please medicate for pain prior to dc if possible and needed to help offset delay when patient first arrives to facility. Care Management Interventions  PCP Verified by CM: Yes  Mode of Transport at Discharge:  Other (see comment) (family)  Transition of Care Consult (CM Consult): SNF  Partner SNF: Yes  Health Maintenance Reviewed: Yes  Physical Therapy Consult: Yes  Occupational Therapy Consult: Yes  Current Support Network: Lives with Spouse  Confirm Follow Up Transport: Family  Plan discussed with Pt/Family/Caregiver: Yes  Freedom of Choice Offered: Yes  Discharge Location  Discharge Placement: Skilled nursing facility

## 2018-04-02 NOTE — PROGRESS NOTES
1764  Assumed care of pt at this time. Assessment complete. Pt alert and oriented x 4 resting in bed. Denies SOB and chest pain. Pt lungs clear bilaterally. Cap refill  less than 3 seconds. Pt denies numbness and tingling to all extremities. Stated pain 0/10 to feet. Pt has PICC line to right arm infusing. Pt has gauze wrap dressing to both feet CDI . Lovonox for VTE prophy. Call light and possessions within reach. Bed in low position. Will continue to monitor. 0820  Pt being transported to [facility] for stress test at this time    1022  Pt returned to floor. 1157  Paged Dr Saritha Medina at this time in regard to pt requesting to eat. Stress test has been completed.      [phone]  Telephone order with readback for diabetic diet by Dr Saritha Medina

## 2018-04-02 NOTE — PROGRESS NOTES
Hospitalist Progress Note    Patient: King Josie MRN: [id number]  [facility]: [id number]    YOB: 1945  Age: 71 y.o. Sex: male    DOA: 3/21/2018 LOS:  LOS: 12 days                Assessment/Plan     Patient Active Problem List   Diagnosis Code    Diabetic foot infection ([facility] E11.628, L08.9    HTN (hypertension) I10    Diabetic neuropathy ([facility] E11.40    Diabetic foot ulcer ([facility] E11.621, L97.509    Diabetes ([facility] E11.9    CAD (coronary artery disease) I25.10    Cellulitis L03.90    Osteomyelitis of right foot ([facility] M86.9            72 yo male admitted for right foot cellulitis, foot ulcer    Cellulitis/osteomyelitis/diabetic foot infection, Right - failed outpatient antibiotics. Blood cultures with no growth  s/p I&D right foot and resection of infected bone 03/23/2018. Wound cultures with polymicrobial growth. Seen by ID  Antibiotics - vancomycin and zosyn per ID recommendations. Discussed with ID, do not recommend long term antibiotics if the bone margins are clear. Discussed with Dr. Brgiht Patrick, pathology report did not mention about margins. However he feels patient needs 6 weeks of antibiotics    Patient does not want home health dressing changes. He is also not compliant with non weight baring on his right foot. Discussed compliance and dressing changes concerns with patient. He still does not want home health wound care.      DM - continue home insulin and SSI.     HTN - monitor BP     CAD - continue with aspirin  Chest pain - seen by cardiology, for stress test today.     DVT prophylaxis          Disposition : tomorrow    Review of systems  General: No fevers or chills. Cardiovascular: No. No palpitations. See above  Pulmonary: No shortness of breath. Gastrointestinal: No nausea, vomiting. Physical Exam:  General: Awake, cooperative, no acute distress    HEENT: NC, Atraumatic. PERRLA, anicteric sclerae. Lungs: CTA Bilaterally.  No Wheezing/Rhonchi/Rales. Heart:  Regular  rhythm,  No murmur, No Rubs, No Gallops  Abdomen: Soft, Non distended, Non tender.  +Bowel sounds,   Extremities: Right foot with dressing. diabetic foot ulcer right heel and bilateral MTP joint. Open wound right mid foot plantar aspect, with drainage. Psych:   Not anxious or agitated. Neurologic:  No acute neurological deficit. Vital signs/Intake and Output:  Visit Vitals    /53 (BP 1 Location: Left arm, BP Patient Position: Sitting)    Pulse 78    Temp 98 °F (36.7 °C)    Resp 22    Ht 6' 1\" (1.854 m)    Wt 123.6 kg (272 lb 6.4 oz)    SpO2 98%    BMI 35.94 kg/m2     Current Shift:  04/02 0701 - 04/02 1900  In: -   Out: 600 [Urine:600]  Last three shifts:  03/31 1901 - 04/02 0700  In: 1960 [P.O.:1360; I.V.:600]  Out: 4094 [Urine:2675]            Labs: Results:       Chemistry Recent Labs      04/02/18   0240 04/01/18   0730  03/31/18   0241   GLU  205*  151*  146*   NA  143  142  148*   K  3.9  3.8  3.7   CL  109*  109*  114*   CO2  28  29  29   BUN  12  9  10   CREA  1.04  1.02  0.79   CA  8.1*  8.0*  8.0*   AGAP  6  4  5   BUCR  12  9*  13   AP   --    --   70   TP   --    --   6.0*   ALB   --    --   2.2*   GLOB   --    --   3.8   AGRAT   --    --   0.6*      CBC w/Diff Recent Labs      04/02/18   0240 03/31/18   1230   WBC  7.0  5.4   RBC  3.26*  3.90*   HGB  9.9*  12.0*   HCT  30.8*  36.7   PLT  210  173   GRANS   --   75*   LYMPH   --   16*   EOS   --   3      Cardiac Enzymes Recent Labs      03/31/18   0241 03/30/18   1900   CPK  20*  30*   CKND1  CALCULATION NOT PERFORMED WHEN RESULT IS BELOW LINEAR LIMIT  4.0      Coagulation No results for input(s): PTP, INR, APTT in the last 72 hours.     No lab exists for component: INREXT, INREXT    Lipid Panel No results found for: CHOL, CHOLPOCT, CHOLX, CHLST, CHOLV, 810265, HDL, LDL, LDLC, DLDLP, 318602, VLDLC, VLDL, TGLX, TRIGL, TRIGP, TGLPOCT, CHHD, CHHDX   BNP No results for input(s): BNPP in the last 72 hours.    Liver Enzymes Recent Labs      03/31/18   0241   TP  6.0*   ALB  2.2*   AP  70   SGOT  17      Thyroid Studies No results found for: T4, T3U, TSH, TSHEXT, TSHEXT     Procedures/imaging: see electronic medical records for all procedures/Xrays and details which were not copied into this note but were reviewed prior to creation of Plan

## 2018-04-02 NOTE — PROGRESS NOTES
Problem: Mobility Impaired (Adult and Pediatric)  Goal: *Acute Goals and Plan of Care (Insert Text)  In 1-7 days pt will be able to perform:  ST.  Bed mobility:  Rolling L to R to L modified independent for positioning. 2.  Supine to sit to supine S with HR for meals. 3.  Sit to stand to sit S with knee scooter in prep for ambulation. LT.  Gait:  Ambulate >100ft S with knee scooter NWB, for home/community mobility. 2.  Stair Negotiation:  Ascend/descend >3 steps CGA with HR NWB on R for home entry. 3.  Patient/Family Education:  Patient/family to be independent with HEP for follow-up care and safe discharge. 3.  Sit to stand to sit S with knee scooter in prep for ambulation. Goals reviewed and updated 2018. Pt will be able to perform following in 1-7 days:  ST/LT  1. Transfer supine <>sit mod I for independence. 2.  Transfer sit <>stand S adhereing to R NWB to ensure safety and independence. 3.  Gait:  Ambulate >50ft S with knee scooter/RW R NWB, for home/community mobility. 4.  Patient/Family Education:  Patient/family to be independent with HEP for follow-up care and safe discharge. physical Therapy RE-EVALUATION and TREATMENT    Patient: Ally Moe (44 y.o. male)  Date: 2018  Diagnosis: Diabetic foot infection ([address]  infected right foot Diabetic foot infection ([address]  Procedure(s) (LRB):  RIGHT FOOT INCISION/DRAINAGE, BONE BIOPSY, AND RESECTION OF INFECTED BONE RIGHT FOOT (Right) 10 Days Post-Op  Precautions: Fall, NWB    ASSESSMENT:  Pt denies pain in B feet at this time. Pt cont to have limitations in mobility in regard to gt tolerance, adhering to R NWB and gt quality, safety, strength, transfers and activity tolerance. Pt able to transition supine to sit S. Sit to stand required CGA and vc for adhering to R NWB. Pt used RW for gt training and attempts R NWB but is insistent on placing some wt on lateral back of R heel. Pt cont to need vc for safety.   Pt is making slow progress towards goals and cont to recommend rehab upon hospital d/c. Pt left sitting EOB w/ all needs within reach. Nurse Margarita aware. Patient's progression toward goals since last assessment: Goals reviewed and updated. Pt showing progress towards goals. PLAN:  Goals have been updated based on progression since last assessment. Patient continues to benefit from skilled intervention to address the above impairments. Continue to follow the patient 1-2 times per day/4-7 days per week to address goals. Planned Interventions:  [x]     Bed Mobility Training          []     Neuromuscular Re-Education  [x]     Transfer Training                []    Orthotic/Prosthetic Training  [x]     Gait Training                       []     Modalities  [x]     Therapeutic Exercises       []     Edema Management/Control  [x]     Therapeutic Activities         [x]     Patient and Family Training/Education  []     Other (comment):  Discharge Recommendations: Rehab  Further Equipment Recommendations for Discharge: N/A     SUBJECTIVE:   Patient stated I can get up if I just put wt on the back part of my heel.     OBJECTIVE DATA SUMMARY:   Critical Behavior:  Neurologic State: Alert, Appropriate for age  Orientation Level: Oriented X4  Cognition: Follows commands, Impaired decision making  Safety/Judgement: Awareness of environment, Fall prevention, Good awareness of safety precautions, Insight into deficits  Functional Mobility Training:  Bed Mobility:  Supine to Sit: Supervision  Sit to Supine: Supervision  Scooting: Supervision  Transfers:  Sit to Stand: Contact guard assistance (vc; bed elevated)  Stand to Sit: Contact guard assistance; Additional time (vc)  Balance:  Sitting: Intact  Sitting - Static: Good (unsupported)  Sitting - Dynamic: Fair (occasional)  Standing: Impaired  Standing - Static: Fair;Constant support  Standing - Dynamic : Poor  Ambulation/Gait Training:  Distance (ft): 20 Feet (ft)  Assistive Device: Walker, rolling  Ambulation - Level of Assistance: Contact guard assistance; Additional time (vc)  Gait Abnormalities: Decreased step clearance; Path deviations (difficulty adhering to NWB R)  Right Side Weight Bearing: Non-weight bearing  Base of Support: Shift to left;Center of gravity altered  Stance: Weight shift;Time;Left increased;Right decreased  Speed/Rose: Slow;Shuffled  Step Length: Left shortened;Right shortened  Swing Pattern: Left asymmetrical;Right asymmetrical  Interventions: Safety awareness training; Tactile cues; Verbal cues; Visual/Demos  Neuro Re-Education:  Therapeutic Exercises:   LAQ, seated marches B x20 reps each. Pain:  Pain Scale 1: Numeric (0 - 10)  Pain Intensity 1: 0  Activity Tolerance:   Fair   Please refer to the flowsheet for vital signs taken during this treatment.   After treatment:   [x]  Patient left in no apparent distress sitting up EOB  []  Patient left in no apparent distress in bed  [x]  Call bell left within reach  [x]  Nursing notified  []  Caregiver present  []  Bed alarm activated    Merissa Mace PT   Time Calculation: 15 mins

## 2018-04-02 NOTE — PROGRESS NOTES
I was paged. Pt want to leave AMA last night. He was upset because he was asked to participate with the physical therapy and he refused to to do so. States \" I don't care if I die\". pt began to take off the ACE wrap, put his shoes, try to walk out. I have spent 30 mins to discuss the case, the treatment plan with the pt. He finally calms down and agree to stay.

## 2018-04-02 NOTE — PROGRESS NOTES
Cardiology Progress Note        Patient: Roselia Brewster        Sex: male          DOA: 3/21/2018  YOB: 1945      Age:  71 y.o.        LOS:  LOS: 11 days   Patient seen and examined, chart reviewed. Assessment/Plan     Patient Active Problem List   Diagnosis Code    Diabetic foot infection ([facility] E11.628, L08.9    HTN (hypertension) I10    Diabetic neuropathy (HCC) E11.40    Diabetic foot ulcer ([facility] E11.621, L97.509    Diabetes ([facility] E11.9    CAD (coronary artery disease) I25.10    Cellulitis L03.90    Osteomyelitis of right foot ([facility] M86.9      CAD h/o CABG in 1998 and PCI in 2012  Chest pain   Abnormal troponin - not following patten of MI    Patient mentioned that he has left sided chest pain off and on for past few months. He had one episode last night. He can not describe his symptoms well. Plan:    Continue aspirin, beta blocker, statin and Full dose of Lovenox  Start Isosorbide dinitrate 10 mg PO TID  Continue management as per hospital medicine. NPO after mid night  Lexiscan stress test in AM  Plan discussed with patient and family member. Family member had questions and concerns I answered all and she verbally understood. Subjective:    cc: Had episode of chest pain last night, it was on left side, can not describe it well. He mentioned that he has this type of pain for past few months. Denies any other symptoms at this time. REVIEW OF SYSTEMS:     General: No fevers or chills. Cardiovascular: Positive chest pain,No palpitations, No orthopnea, No PND, No leg swelling Pulmonary: No dyspnea. Gastrointestinal: No nausea, vomiting, bleeding  Neurology: No Dizziness    Objective:      Visit Vitals    /57 (BP 1 Location: Right arm, BP Patient Position: Sitting; At rest)    Pulse 74    Temp 99 °F (37.2 °C)    Resp 20    Ht 6' 1\" (1.854 m)    Wt 123.9 kg (273 lb 2.4 oz)    SpO2 98%    BMI 36.04 kg/m2     Body mass index is 36.04 kg/(m^2). Physical Exam:  General Appearance: Comfortable, not using accessory muscles of respiration. HEENT: SORIN. HEAD: Atraumatic  NECK: No JVD, no thyroidomeglay. CAROTIDS: No bruit  LUNGS: Clear bilaterally. HEART: S1+S2 audible, no murmur, no pericardial rub. ABD: Non-tender, BS Audible    NEUROLOGICAL: AAO times 3, No motor and sensory deficit.   Medication:  Current Facility-Administered Medications   Medication Dose Route Frequency    isosorbide dinitrate (ISORDIL) tablet 10 mg  10 mg Oral TID    insulin lispro (HUMALOG) injection 18 Units  18 Units SubCUTAneous TID WITH MEALS    Vancomycin Trough Due  1 Each Other ONCE    HYDROcodone-acetaminophen (NORCO) 5-325 mg per tablet 1 Tab  1 Tab Oral Q6H PRN    carvedilol (COREG) tablet 3.125 mg  3.125 mg Oral BID WITH MEALS    enoxaparin (LOVENOX) injection 120 mg  120 mg SubCUTAneous Q12H    atorvastatin (LIPITOR) tablet 40 mg  40 mg Oral QHS    clotrimazole (LOTRIMIN) 1 % cream   Topical BID    piperacillin-tazobactam (ZOSYN) 3.375 g in 0.9% sodium chloride (MBP/ADV) 100 mL MBP  3.375 g IntraVENous Q6H    vancomycin (VANCOCIN) 1750 mg in  ml infusion  1,750 mg IntraVENous Q12H    sodium hypochlorite (QUARTER STRENGTH DAKIN'S) 0.125% irrigation (bottle)   Topical Q MON, WED & SUN    guaiFENesin (ROBITUSSIN) 100 mg/5 mL oral liquid 100 mg  100 mg Oral Q4H PRN    insulin lispro (HUMALOG) injection   SubCUTAneous AC&HS    naloxone (NARCAN) injection 0.4 mg  0.4 mg IntraVENous PRN    aspirin chewable tablet 81 mg  81 mg Oral DAILY    sodium chloride (NS) flush 5-10 mL  5-10 mL IntraVENous PRN    acetaminophen (TYLENOL) tablet 650 mg  650 mg Oral Q4H PRN    glucose chewable tablet 16 g  16 g Oral PRN    glucagon (GLUCAGEN) injection 1 mg  1 mg IntraMUSCular PRN    dextrose (D50W) injection syrg 25 g  50 mL IntraVENous PRN    Vancomycin pharamcy to dose  1 Each Other Rx Dosing/Monitoring    insulin glargine (LANTUS) injection 30 Units  30 Units SubCUTAneous DAILY    polyethylene glycol (MIRALAX) packet 17 g  17 g Oral DAILY               Lab/Data Reviewed:       Recent Labs      03/31/18   1230   WBC  5.4   HGB  12.0*   HCT  36.7   PLT  173     Recent Labs      04/01/18   0730  03/31/18   0241   NA  142  148*   K  3.8  3.7   CL  109*  114*   CO2  29  29   GLU  151*  146*   BUN  9  10   CREA  1.02  0.79   CA  8.0*  8.0*       Signed By: Nadya Zelaya MD     April 1, 2018

## 2018-04-02 NOTE — PROGRESS NOTES
[facility]  [address] [place], [address] [facility]  Drs: Bekah Valderrama, Peggy Cameron    Plan:   1. Patient will continue same regiment of scooter on the right foot and cam boot on the left foot. 2. He is awaiting D/C to rehab for continued IV antibiotic therapy since having chest pains. 3. Nurses will pack all right foot wounds with dakins solution 1/4st, 3x per week and wet to dry betadine dressing on the left foot 3x per week. 4. He will f/u with me in one week of being discharged. 5. Continue IV antibiotics per ID    Assessment/Plan           Patient Active Problem List   Diagnosis Code    Diabetic foot infection ([facility] E11.628, L08.9    HTN (hypertension) I10    Diabetic neuropathy (HCC) E11.40    Diabetic foot ulcer ([facility] E11.621, L97.509    Diabetes ([facility] E11.9    CAD (coronary artery disease) I25.10    Cellulitis L03.90    Osteomyelitis of right foot ([facility] M86.9    Chest pain - seen by cardiology, for stress test today.      Seen by ID  Antibiotics - vancomycin and zosyn per ID recommendations.      Cellulitis/osteomyelitis/diabetic foot infection, Right - failed outpatient antibiotics  S/  Patient is seen today at bedside feeling better, minimum to no chest pains nor shortness of breath and minor every now and then foot pains. O/     Review of systems  General: No fevers or chills. Cardiovascular: No. No palpitations. See above  Pulmonary: No shortness of breath. Gastrointestinal: No nausea, vomiting.       Right foot:  CFT wnl all digits, +sutures intact plantar submet one with proximal 1st mpj ulcer for packing after status post resection of infected bone and I&D of arch. Suture in arch and heel intact without dehiscence. Marked decrease in redness and calor, neg. Cellulitis, Decreased purulent/bloody discharge seen on packing when changed. Marked decrease in non pitting edema. Neg. Foul odor.  Left foot submet one ulceration with pink granulation tissue about the size of a nickel without signs of infection. Results for Betty Bird (MRN [id number]) as of 4/2/2018 14:07   Ref. Range 4/2/2018 11:38   GLUCOSE,FAST - POC Latest Ref Range: 70 - 110 mg/dL 149 (H)     Diagnosis   Preliminary      Follow nuclear images report.      Test indication   Preliminary     CHEST PAIN, ABNORMAL TROPONIN, CAD     Functional capacity   Preliminary     Could Not Be Adequately Assessed     ECG Interp. Before Exercise   Preliminary     Sinus rhythm, first degree AV block,PVC, Nonspeci     ECG Interp. During Exercise Non specific ST T changes  Preliminary     Overall HR response to exercise   Preliminary     Overall BP response to exercise   Preliminary     Max. Systolic  mmHg Preliminary     Max. Diastolic BP 67 mmHg Preliminary     Max.  Heart rate 91 BPM Preliminary     Villanueva treadmill score   Preliminary     Villanueva TM score result        3/31/2018  2:21 PM - [facility]       Component Results      Component Value Flag Ref Range Units Status     Special Requests: NO SPECIAL REQUESTS     Final     Culture result: FEW DIPHTHEROIDS (A)    Final     Culture result:  (A)    Final     FEW PEPTOSTREPTOCOCCUS RAMÓN     Culture result:  (A)    Final     FEW PEPTOSTREPTOCOCCUS MICROS     Culture result:  (A)    Final     RARE VEILLONELLA SPECIES     Culture result:  (A)    Final      FEW STAPHYLOCOCCUS SPECIES, COAGULASE NEGATIVE     Culture result:  (A)    Final     RARE STREPTOCOCCI, BETA HEMOLYTIC GROUP B     Culture result:  (A)    Final     MORAXELLA OSLOENSIS ISOLATED FROM BROTH ONLY       Culture & Susceptibility      COAGULASE NEGATIVE STAPHYLOCOCCUS SPECIES      Antibiotic Sensitivity LANA Unit Status     Ampicillin/sulbactam ($) Resistant  ug/mL Final     Method: LANA     Cefazolin ($) Resistant  ug/mL Final     Method: LANA     Clindamycin ($) Susceptible <=0.25 ug/mL Final     Method: LANA     Erythromycin ($$$$) Susceptible 0.5 ug/mL Final     Method: LANA     Gentamicin ($) Intermediate 8 ug/mL Final     Method: LANA     Levofloxacin ($) Resistant >=8 ug/mL Final     Method: LANA     Oxacillin Resistant 0.5 ug/mL Final     Method: LANA     Penicillin G ($$) Resistant 0.12 ug/mL Final     Method: LANA     Rifampin ($$$$) Susceptible <=0.5 ug/mL Final     Method: LANA     Tetracycline Susceptible <=1 ug/mL Final     Method: LANA     Tigecycline ($$$$) Susceptible <=0.12 ug/mL Final     Method: LANA     Trimeth-Sulfamethoxa Susceptible <=10 ug/mL Final     Method: LANA     Vancomycin ($) Susceptible 1 ug/mL Final     Method: LANA        Culture result:  (A)    Final      FEW PEPTOSTREPTOCOCCUS RAMÓN

## 2018-04-02 NOTE — PROGRESS NOTES
Problem: Mobility Impaired (Adult and Pediatric)  Goal: *Acute Goals and Plan of Care (Insert Text)  In 1-7 days pt will be able to perform:  ST.  Bed mobility:  Rolling L to R to L modified independent for positioning. 2.  Supine to sit to supine S with HR for meals. 3.  Sit to stand to sit S with knee scooter in prep for ambulation. LT.  Gait:  Ambulate >100ft S with knee scooter NWB, for home/community mobility. 2.  Stair Negotiation:  Ascend/descend >3 steps CGA with HR NWB on R for home entry. 3.  Patient/Family Education:  Patient/family to be independent with HEP for follow-up care and safe discharge.        Attempted PT treatment this morning however pt off the floor due to stress test.

## 2018-04-02 NOTE — PROGRESS NOTES
Occupational Therapy Screening:  Services are indicated. Evaluate and Treat Order is requested. An InBasket screening referral was triggered for occupational therapy based on results obtained during the nursing admission assessment. The patients chart was reviewed and the patient is appropriate for a skilled therapy evaluation. Patient noted to have DM foot ulcer s/p bone biopsy & I&D. Pt accepted at [facility]. Would be best to have OT assess at SNF/Rehab. Thank you.   Josefina Cronin, OTR/L

## 2018-04-02 NOTE — PROGRESS NOTES
Responded to pts room complaining of pain that will \"come and go\" states that its random and he can be resting or doing activity. States the pain will sometimes be a 3 or a 7. VSS, EKG done and transmitted, primary RN is paging Cardiology.  Pt declined O2

## 2018-04-02 NOTE — ROUTINE PROCESS
.Bedside and Verbal shift change report given to Gaby Soares RN (oncoming nurse) by Juan Ramon Jones RN   (offgoing nurse). Report included the following information SBAR, Kardex, Intake/Output, MAR, Recent Results and Med Rec Status.

## 2018-04-02 NOTE — ROUTINE PROCESS
Bedside and Verbal shift change report given to [address] (oncoming nurse) by Jillian Mixon RN (offgoing nurse). Report included the following information SBAR, Kardex, MAR and Recent Results.

## 2018-04-03 ENCOUNTER — APPOINTMENT (OUTPATIENT)
Dept: CT IMAGING | Age: 73
DRG: 629 | End: 2018-04-03
Attending: HOSPITALIST
Payer: MEDICARE

## 2018-04-03 LAB
ALBUMIN SERPL-MCNC: 2.4 G/DL (ref 3.4–5)
ALBUMIN/GLOB SERPL: 0.7 {RATIO} (ref 0.8–1.7)
ALP SERPL-CCNC: 72 U/L (ref 45–117)
ALT SERPL-CCNC: 21 U/L (ref 16–61)
ANION GAP SERPL CALC-SCNC: 9 MMOL/L (ref 3–18)
AST SERPL-CCNC: 14 U/L (ref 15–37)
BASOPHILS # BLD: 0 K/UL (ref 0–0.06)
BASOPHILS NFR BLD: 1 % (ref 0–2)
BILIRUB SERPL-MCNC: 0.2 MG/DL (ref 0.2–1)
BUN SERPL-MCNC: 10 MG/DL (ref 7–18)
BUN/CREAT SERPL: 9 (ref 12–20)
CALCIUM SERPL-MCNC: 8 MG/DL (ref 8.5–10.1)
CHLORIDE SERPL-SCNC: 112 MMOL/L (ref 100–108)
CO2 SERPL-SCNC: 27 MMOL/L (ref 21–32)
CREAT SERPL-MCNC: 1.06 MG/DL (ref 0.6–1.3)
DIFFERENTIAL METHOD BLD: ABNORMAL
EOSINOPHIL # BLD: 0.2 K/UL (ref 0–0.4)
EOSINOPHIL NFR BLD: 2 % (ref 0–5)
ERYTHROCYTE [DISTWIDTH] IN BLOOD BY AUTOMATED COUNT: 13.9 % (ref 11.6–14.5)
GLOBULIN SER CALC-MCNC: 3.6 G/DL (ref 2–4)
GLUCOSE BLD STRIP.AUTO-MCNC: 104 MG/DL (ref 70–110)
GLUCOSE BLD STRIP.AUTO-MCNC: 126 MG/DL (ref 70–110)
GLUCOSE BLD STRIP.AUTO-MCNC: 154 MG/DL (ref 70–110)
GLUCOSE BLD STRIP.AUTO-MCNC: 327 MG/DL (ref 70–110)
GLUCOSE SERPL-MCNC: 110 MG/DL (ref 74–99)
HCT VFR BLD AUTO: 29.7 % (ref 36–48)
HGB BLD-MCNC: 9.4 G/DL (ref 13–16)
LYMPHOCYTES # BLD: 0.9 K/UL (ref 0.9–3.6)
LYMPHOCYTES NFR BLD: 14 % (ref 21–52)
MCH RBC QN AUTO: 29.9 PG (ref 24–34)
MCHC RBC AUTO-ENTMCNC: 31.6 G/DL (ref 31–37)
MCV RBC AUTO: 94.6 FL (ref 74–97)
MONOCYTES # BLD: 0.6 K/UL (ref 0.05–1.2)
MONOCYTES NFR BLD: 9 % (ref 3–10)
NEUTS SEG # BLD: 5 K/UL (ref 1.8–8)
NEUTS SEG NFR BLD: 74 % (ref 40–73)
PLATELET # BLD AUTO: 192 K/UL (ref 135–420)
PMV BLD AUTO: 9.5 FL (ref 9.2–11.8)
POTASSIUM SERPL-SCNC: 3.7 MMOL/L (ref 3.5–5.5)
PROT SERPL-MCNC: 6 G/DL (ref 6.4–8.2)
RBC # BLD AUTO: 3.14 M/UL (ref 4.7–5.5)
SODIUM SERPL-SCNC: 148 MMOL/L (ref 136–145)
WBC # BLD AUTO: 6.7 K/UL (ref 4.6–13.2)

## 2018-04-03 PROCEDURE — 74011250636 HC RX REV CODE- 250/636: Performed by: HOSPITALIST

## 2018-04-03 PROCEDURE — 80053 COMPREHEN METABOLIC PANEL: CPT | Performed by: INTERNAL MEDICINE

## 2018-04-03 PROCEDURE — 65660000000 HC RM CCU STEPDOWN

## 2018-04-03 PROCEDURE — 74011000258 HC RX REV CODE- 258: Performed by: HOSPITALIST

## 2018-04-03 PROCEDURE — 93971 EXTREMITY STUDY: CPT

## 2018-04-03 PROCEDURE — 73700 CT LOWER EXTREMITY W/O DYE: CPT

## 2018-04-03 PROCEDURE — 74011250637 HC RX REV CODE- 250/637: Performed by: PHYSICIAN ASSISTANT

## 2018-04-03 PROCEDURE — 74011250637 HC RX REV CODE- 250/637: Performed by: INTERNAL MEDICINE

## 2018-04-03 PROCEDURE — 36592 COLLECT BLOOD FROM PICC: CPT

## 2018-04-03 PROCEDURE — 82962 GLUCOSE BLOOD TEST: CPT

## 2018-04-03 PROCEDURE — 85025 COMPLETE CBC W/AUTO DIFF WBC: CPT | Performed by: INTERNAL MEDICINE

## 2018-04-03 PROCEDURE — 74011636637 HC RX REV CODE- 636/637: Performed by: PHYSICIAN ASSISTANT

## 2018-04-03 PROCEDURE — 74011636637 HC RX REV CODE- 636/637: Performed by: HOSPITALIST

## 2018-04-03 PROCEDURE — 74011250637 HC RX REV CODE- 250/637: Performed by: PODIATRIST

## 2018-04-03 PROCEDURE — 74011250637 HC RX REV CODE- 250/637: Performed by: HOSPITALIST

## 2018-04-03 PROCEDURE — 74011636637 HC RX REV CODE- 636/637: Performed by: INTERNAL MEDICINE

## 2018-04-03 RX ORDER — MIDAZOLAM HYDROCHLORIDE 1 MG/ML
.5-2 INJECTION, SOLUTION INTRAMUSCULAR; INTRAVENOUS
Status: DISCONTINUED | OUTPATIENT
Start: 2018-04-03 | End: 2018-04-03 | Stop reason: HOSPADM

## 2018-04-03 RX ORDER — EPTIFIBATIDE 0.75 MG/ML
2 INJECTION, SOLUTION INTRAVENOUS CONTINUOUS
Status: DISCONTINUED | OUTPATIENT
Start: 2018-04-03 | End: 2018-04-05

## 2018-04-03 RX ORDER — FENTANYL CITRATE 50 UG/ML
25-100 INJECTION, SOLUTION INTRAMUSCULAR; INTRAVENOUS
Status: DISCONTINUED | OUTPATIENT
Start: 2018-04-03 | End: 2018-04-03 | Stop reason: HOSPADM

## 2018-04-03 RX ORDER — LIDOCAINE HYDROCHLORIDE 10 MG/ML
3-20 INJECTION, SOLUTION EPIDURAL; INFILTRATION; INTRACAUDAL; PERINEURAL
Status: DISCONTINUED | OUTPATIENT
Start: 2018-04-03 | End: 2018-04-03 | Stop reason: ALTCHOICE

## 2018-04-03 RX ORDER — SODIUM CHLORIDE 9 MG/ML
50 INJECTION, SOLUTION INTRAVENOUS CONTINUOUS
Status: DISCONTINUED | OUTPATIENT
Start: 2018-04-03 | End: 2018-04-05

## 2018-04-03 RX ORDER — EPTIFIBATIDE 2 MG/ML
180 INJECTION, SOLUTION INTRAVENOUS
Status: DISCONTINUED | OUTPATIENT
Start: 2018-04-03 | End: 2018-04-03 | Stop reason: HOSPADM

## 2018-04-03 RX ORDER — HEPARIN SODIUM 1000 [USP'U]/ML
4000 INJECTION, SOLUTION INTRAVENOUS; SUBCUTANEOUS
Status: DISCONTINUED | OUTPATIENT
Start: 2018-04-03 | End: 2018-04-03 | Stop reason: HOSPADM

## 2018-04-03 RX ORDER — HEPARIN SODIUM 200 [USP'U]/100ML
500 INJECTION, SOLUTION INTRAVENOUS
Status: DISCONTINUED | OUTPATIENT
Start: 2018-04-03 | End: 2018-04-03 | Stop reason: HOSPADM

## 2018-04-03 RX ADMIN — ATORVASTATIN CALCIUM 40 MG: 20 TABLET, FILM COATED ORAL at 22:26

## 2018-04-03 RX ADMIN — INSULIN LISPRO 3 UNITS: 100 INJECTION, SOLUTION INTRAVENOUS; SUBCUTANEOUS at 16:30

## 2018-04-03 RX ADMIN — CLOTRIMAZOLE: 10 CREAM TOPICAL at 22:30

## 2018-04-03 RX ADMIN — ISOSORBIDE DINITRATE 20 MG: 20 TABLET ORAL at 11:25

## 2018-04-03 RX ADMIN — PIPERACILLIN SODIUM,TAZOBACTAM SODIUM 3.38 G: 3; .375 INJECTION, POWDER, FOR SOLUTION INTRAVENOUS at 11:24

## 2018-04-03 RX ADMIN — CARVEDILOL 3.12 MG: 3.12 TABLET, FILM COATED ORAL at 11:25

## 2018-04-03 RX ADMIN — CARVEDILOL 3.12 MG: 3.12 TABLET, FILM COATED ORAL at 17:50

## 2018-04-03 RX ADMIN — ISOSORBIDE DINITRATE 20 MG: 20 TABLET ORAL at 22:26

## 2018-04-03 RX ADMIN — PIPERACILLIN SODIUM,TAZOBACTAM SODIUM 3.38 G: 3; .375 INJECTION, POWDER, FOR SOLUTION INTRAVENOUS at 22:25

## 2018-04-03 RX ADMIN — GUAIFENESIN 100 MG: 200 SOLUTION ORAL at 03:44

## 2018-04-03 RX ADMIN — CLOTRIMAZOLE: 10 CREAM TOPICAL at 09:00

## 2018-04-03 RX ADMIN — PIPERACILLIN SODIUM,TAZOBACTAM SODIUM 3.38 G: 3; .375 INJECTION, POWDER, FOR SOLUTION INTRAVENOUS at 03:16

## 2018-04-03 RX ADMIN — ASPIRIN 81 MG 81 MG: 81 TABLET ORAL at 11:25

## 2018-04-03 RX ADMIN — VANCOMYCIN HYDROCHLORIDE 1750 MG: 10 INJECTION, POWDER, LYOPHILIZED, FOR SOLUTION INTRAVENOUS at 13:36

## 2018-04-03 RX ADMIN — INSULIN LISPRO 18 UNITS: 100 INJECTION, SOLUTION INTRAVENOUS; SUBCUTANEOUS at 17:00

## 2018-04-03 RX ADMIN — HYDROCODONE BITARTRATE AND ACETAMINOPHEN 1 TABLET: 5; 325 TABLET ORAL at 16:55

## 2018-04-03 RX ADMIN — INSULIN LISPRO 12 UNITS: 100 INJECTION, SOLUTION INTRAVENOUS; SUBCUTANEOUS at 13:33

## 2018-04-03 RX ADMIN — INSULIN GLARGINE 30 UNITS: 100 INJECTION, SOLUTION SUBCUTANEOUS at 11:25

## 2018-04-03 RX ADMIN — VANCOMYCIN HYDROCHLORIDE 1750 MG: 10 INJECTION, POWDER, LYOPHILIZED, FOR SOLUTION INTRAVENOUS at 00:54

## 2018-04-03 RX ADMIN — POLYETHYLENE GLYCOL 3350 17 G: 17 POWDER, FOR SOLUTION ORAL at 11:25

## 2018-04-03 NOTE — PROGRESS NOTES
Cardiology Progress Note        Patient: Roselia Brewster        Sex: male          DOA: 3/21/2018  YOB: 1945      Age:  71 y.o.        LOS:  LOS: 12 days    Patient seen and examined, chart reviewed. Assessment/Plan     Patient Active Problem List   Diagnosis Code    Diabetic foot infection ([facility].) E11.628, L08.9    HTN (hypertension) I10    Diabetic neuropathy (HCC) E11.40    Diabetic foot ulcer ([facility] E11.621, L97.509    Diabetes ([facility] E11.9    CAD (coronary artery disease) I25.10    Cellulitis L03.90    Osteomyelitis of right foot ([facility] M86.9      Chest pain   Abnormal troponin  Abnormal stress test    Patient has lot of stress at home. Plan:    Continue aspirin, beta blocker, nitrates, statin and Lovenox. Increase Isosorbide dinitrate 20 mg TID. Patient mentioned that he has this type of chest pain for past few months. In view of chest pain, known CAD and abnormal stress test advised about LHC and coronary angiogram +/- PCI. All risks, benefits and alternatives explained to patient and he verbally understood. Patient is not sure about procedure. Continue management as per hospital medicine                    Subjective:    cc: Had episode of chest pain last night      REVIEW OF SYSTEMS:     General: No fevers or chills. Cardiovascular: Positive chest pain,No palpitations, No orthopnea, No PND, No leg swelling, No claudication  Pulmonary: No dyspnea. Gastrointestinal: No nausea, vomiting, bleeding  Neurology: No Dizziness    Objective:      Visit Vitals    /62 (BP 1 Location: Left arm, BP Patient Position: Sitting)    Pulse 89    Temp 98.3 °F (36.8 °C)    Resp 18    Ht 6' 1\" (1.854 m)    Wt 123.6 kg (272 lb 6.4 oz)    SpO2 96%    BMI 35.94 kg/m2     Body mass index is 35.94 kg/(m^2). Physical Exam:  General Appearance: Comfortable, not using accessory muscles of respiration. HEENT: SORIN.    HEAD: Atraumatic  NECK: No JVD, no thyroidomeglay. CAROTIDS: No bruit  LUNGS: Clear bilaterally. HEART: S1+S2 audible, no murmur, no pericardial rub. ABD: Non-tender, BS Audible    PSYCHIATRIC EXAM: Mood is appropriate. MUSCULOSKELETAL: Grossly no joint deformity. NEUROLOGICAL: AAO times 3, No motor and sensory deficit.   Medication:  Current Facility-Administered Medications   Medication Dose Route Frequency    isosorbide dinitrate (ISORDIL) tablet 20 mg  20 mg Oral TID    insulin lispro (HUMALOG) injection 18 Units  18 Units SubCUTAneous TID WITH MEALS    nitroglycerin (NITROSTAT) tablet 0.4 mg  0.4 mg SubLINGual PRN    HYDROcodone-acetaminophen (NORCO) 5-325 mg per tablet 1 Tab  1 Tab Oral Q6H PRN    carvedilol (COREG) tablet 3.125 mg  3.125 mg Oral BID WITH MEALS    enoxaparin (LOVENOX) injection 120 mg  120 mg SubCUTAneous Q12H    atorvastatin (LIPITOR) tablet 40 mg  40 mg Oral QHS    clotrimazole (LOTRIMIN) 1 % cream   Topical BID    piperacillin-tazobactam (ZOSYN) 3.375 g in 0.9% sodium chloride (MBP/ADV) 100 mL MBP  3.375 g IntraVENous Q6H    vancomycin (VANCOCIN) 1750 mg in  ml infusion  1,750 mg IntraVENous Q12H    sodium hypochlorite (QUARTER STRENGTH DAKIN'S) 0.125% irrigation (bottle)   Topical Q MON, WED & SUN    guaiFENesin (ROBITUSSIN) 100 mg/5 mL oral liquid 100 mg  100 mg Oral Q4H PRN    insulin lispro (HUMALOG) injection   SubCUTAneous AC&HS    naloxone (NARCAN) injection 0.4 mg  0.4 mg IntraVENous PRN    aspirin chewable tablet 81 mg  81 mg Oral DAILY    sodium chloride (NS) flush 5-10 mL  5-10 mL IntraVENous PRN    acetaminophen (TYLENOL) tablet 650 mg  650 mg Oral Q4H PRN    glucose chewable tablet 16 g  16 g Oral PRN    glucagon (GLUCAGEN) injection 1 mg  1 mg IntraMUSCular PRN    dextrose (D50W) injection syrg 25 g  50 mL IntraVENous PRN    Vancomycin pharamcy to dose  1 Each Other Rx Dosing/Monitoring    insulin glargine (LANTUS) injection 30 Units  30 Units SubCUTAneous DAILY    polyethylene glycol (MIRALAX) packet 17 g  17 g Oral DAILY               Lab/Data Reviewed:       Recent Labs      04/02/18   0240  03/31/18   1230   WBC  7.0  5.4   HGB  9.9*  12.0*   HCT  30.8*  36.7   PLT  210  173     Recent Labs      04/02/18   0240  04/01/18   0730  03/31/18   0241   NA  143  142  148*   K  3.9  3.8  3.7   CL  109*  109*  114*   CO2  28  29  29   GLU  205*  151*  146*   BUN  12  9  10   CREA  1.04  1.02  0.79   CA  8.1*  8.0*  8.0*       Signed By: Aftab Potter MD     April 2, 2018

## 2018-04-03 NOTE — PROGRESS NOTES
Problem: Mobility Impaired (Adult and Pediatric)  Goal: *Acute Goals and Plan of Care (Insert Text)  In 1-7 days pt will be able to perform:  ST.  Bed mobility:  Rolling L to R to L modified independent for positioning. 2.  Supine to sit to supine S with HR for meals. 3.  Sit to stand to sit S with knee scooter in prep for ambulation. LT.  Gait:  Ambulate >100ft S with knee scooter NWB, for home/community mobility. 2.  Stair Negotiation:  Ascend/descend >3 steps CGA with HR NWB on R for home entry. 3.  Patient/Family Education:  Patient/family to be independent with HEP for follow-up care and safe discharge. 3.  Sit to stand to sit S with knee scooter in prep for ambulation. Goals reviewed and updated 2018. Pt will be able to perform following in 1-7 days:  ST/LT  1. Transfer supine <>sit mod I for independence. 2.  Transfer sit <>stand S adhereing to R NWB to ensure safety and independence. 3.  Gait:  Ambulate >50ft S with knee scooter/RW R NWB, for home/community mobility. 4.  Patient/Family Education:  Patient/family to be independent with HEP for follow-up care and safe discharge. Outcome: Not Progressing Towards Goal  PT session held due to:  []  Nausea/vomiting  []  RN Communication/ suggestion  []  Extreme Pain  [x]  Off unit for cardiac cath  Will f/u tomorrow. Thank you.   Mario Gandhi, PTA

## 2018-04-03 NOTE — DIABETES MGMT
GLYCEMIC CONTROL PROGRESS NOTE:    -discussed in rounds, known h/o T2DM HbA1C not within recommended range r/t non adherence to mixed insulin home regimen  -BG out of target range non-ICU:< 140 mg/dL, requiring consistent corrective coverage  -per Cardiologist pt NPO for cardiac cath  -pt reports he ate breakfast ~ 2 hours ago @ 0830; RN to hold mealtime breakfast insulin as it is too late to administer; Humalog PI states \"give within 15 minutes before a meal or immediately after a meal\"  -TDD = 81 (30 Lantus + 36 mealtime + 15 - Humalog Very Insulin Resistant Corrective Coverage)  -PPG out of target range today, monitor overnight once diet resumes and make adjustments as needed    Recent Glucose Results:   Lab Results   Component Value Date/Time     (H) 04/03/2018 03:36 AM    GLUCPOC 327 (H) 04/03/2018 12:22 PM    GLUCPOC 104 04/03/2018 06:35 AM    GLUCPOC 206 (H) 04/02/2018 10:01 PM         Aniceto Barrientos RN, MS  Glycemic Control Team  Pager [phone] (M-TH 8:30-5P)  *After Hours pager [phone]

## 2018-04-03 NOTE — PROGRESS NOTES
Hospitalist Progress Note    Patient: Teresita Dahl MRN: [id number]  CSN: [id number]    YOB: 1945  Age: 71 y.o. Sex: male    DOA: 3/21/2018 LOS:  LOS: 13 days                Assessment/Plan     Patient Active Problem List   Diagnosis Code    Diabetic foot infection ([facility] E11.628, L08.9    HTN (hypertension) I10    Diabetic neuropathy ([facility] E11.40    Diabetic foot ulcer ([facility] E11.621, L97.509    Diabetes ([facility] E11.9    CAD (coronary artery disease) I25.10    Cellulitis L03.90    Osteomyelitis of right foot ([facility] M86.9            70 yo male admitted for right foot cellulitis, foot ulcer    Cellulitis/osteomyelitis/diabetic foot infection, Right - failed outpatient antibiotics. Blood cultures with no growth  s/p I&D right foot and resection of infected bone 03/23/2018. Wound cultures with polymicrobial growth. Seen by ID  Antibiotics - vancomycin and zosyn           DM - continue home insulin and SSI.     HTN - monitor BP     CAD - continue with aspirin  Chest pain - seen by cardiology,   Stress test abnormal  For cath today     DVT prophylaxis          Disposition : TBD    Review of systems  General: No fevers or chills. Cardiovascular: No. No palpitations. See above  Pulmonary: No shortness of breath. Gastrointestinal: No nausea, vomiting. Physical Exam:  General: Awake, cooperative, no acute distress    HEENT: NC, Atraumatic. PERRLA, anicteric sclerae. Lungs: CTA Bilaterally. No Wheezing/Rhonchi/Rales. Heart:  Regular  rhythm,  No murmur, No Rubs, No Gallops  Abdomen: Soft, Non distended, Non tender.  +Bowel sounds,   Extremities: Right foot with dressing. diabetic foot ulcer right heel and bilateral MTP joint. Open wound right mid foot plantar aspect, with drainage. Psych:   Not anxious or agitated. Neurologic:  No acute neurological deficit.            Vital signs/Intake and Output:  Visit Vitals    /55 (BP 1 Location: Right arm, BP Patient Position: At rest)    Pulse 77    Temp 98 °F (36.7 °C)    Resp 18    Ht 6' 1\" (1.854 m)    Wt 122 kg (268 lb 15.4 oz)    SpO2 94%    BMI 35.49 kg/m2     Current Shift:  04/03 0701 - 04/03 1900  In: 960 [P.O.:360; I.V.:600]  Out: 1350 [Urine:1350]  Last three shifts:  04/01 1901 - 04/03 0700  In: 2183.9 [P.O.:960; I.V.:1223.9]  Out: 3875 [Urine:3875]            Labs: Results:       Chemistry Recent Labs      04/03/18 0336 04/02/18   0240 04/01/18   0730   GLU  110*  205*  151*   NA  148*  143  142   K  3.7  3.9  3.8   CL  112*  109*  109*   CO2  27  28  29   BUN  10  12  9   CREA  1.06  1.04  1.02   CA  8.0*  8.1*  8.0*   AGAP  9  6  4   BUCR  9*  12  9*   AP  72   --    --    TP  6.0*   --    --    ALB  2.4*   --    --    GLOB  3.6   --    --    AGRAT  0.7*   --    --       CBC w/Diff Recent Labs      04/03/18 0336 04/02/18   0240   WBC  6.7  7.0   RBC  3.14*  3.26*   HGB  9.4*  9.9*   HCT  29.7*  30.8*   PLT  192  210   GRANS  74*   --    LYMPH  14*   --    EOS  2   --       Cardiac Enzymes No results for input(s): CPK, CKND1, NELLA in the last 72 hours. No lab exists for component: CKRMB, TROIP   Coagulation No results for input(s): PTP, INR, APTT in the last 72 hours. No lab exists for component: INREXT, INREXT    Lipid Panel No results found for: CHOL, CHOLPOCT, CHOLX, CHLST, CHOLV, 230178, HDL, LDL, LDLC, DLDLP, 504629, VLDLC, VLDL, TGLX, TRIGL, TRIGP, TGLPOCT, CHHD, CHHDX   BNP No results for input(s): BNPP in the last 72 hours.    Liver Enzymes Recent Labs      04/03/18   0336   TP  6.0*   ALB  2.4*   AP  72   SGOT  14*      Thyroid Studies No results found for: T4, T3U, TSH, TSHEXT, TSHEXT     Procedures/imaging: see electronic medical records for all procedures/Xrays and details which were not copied into this note but were reviewed prior to creation of Plan

## 2018-04-03 NOTE — ROUTINE PROCESS
Cardiac Cath Lab:  Pre Procedure Chart Check     Patients chart was accessed and reviewed for possible and/or scheduled procedure. Creatinine Clearance:  CREATININE: 1.06 MG/DL (04/03/18 0336)  Estimated creatinine clearance: 87.4 mL/min    Total Contrast  Load:  3 x estimated clearance amount=  262ml    75% of Contrast Load:  0.75 x Total Contrast Load=    196ml    Recent Labs      04/03/18   0336   WBC  6.7   RBC  3.14*   HCT  29.7*   HGB  9.4*   PLT  192   NA  148*   K  3.7   BUN  10   CREA  1.06   GFRAA  >60   GFRNA  >60   CA  8.0*       BMI: Body mass index is 35.49 kg/(m^2).     ALLERGIES:   Allergies   Allergen Reactions    Doxycycline Anaphylaxis    Bactrim [Sulfamethoprim Ds] Diarrhea    Keflex [Cephalexin] Diarrhea       Lines:     PICC Double Lumen 36/01/75 Right;Basilic (Active)   Central Line Being Utilized Yes 4/3/2018 11:41 AM   Criteria for Appropriate Use Long term IV/antibiotic administration 4/3/2018 11:41 AM   Site Assessment Clean, dry, & intact 4/3/2018 11:41 AM   Phlebitis Assessment 0 4/3/2018 11:41 AM   Infiltration Assessment 0 4/3/2018 11:41 AM   Date of Last Dressing Change 03/30/18 4/3/2018 11:41 AM   Dressing Status Clean, dry, & intact 4/3/2018 11:41 AM   Action Taken Open ports on tubing capped 4/3/2018  3:36 AM   Dressing Type Transparent 4/3/2018 11:41 AM   Hub Color/Line Status Blue 4/3/2018 11:41 AM   Positive Blood Return (Site #1) Yes 4/3/2018 11:41 AM   Hub Color/Line Status Red 4/3/2018 11:41 AM   Positive Blood Return (Site #2) Yes 4/3/2018 11:41 AM   Alcohol Cap Used Yes 4/3/2018 11:41 AM             History:    Past Medical History:   Diagnosis Date    Asthma     CAD (coronary artery disease)     Diabetes ([address]     Di[facility] ulcer ([facility]     Diabetic neuropathy ([facility]     HTN (hypertension)      Past Surgical History:   Procedure Laterality Date    CARDIAC SURG PROCEDURE UNLIST  1998    bypass    HX ORTHOPAEDIC       Patient Active Problem List   Diagnosis Code    Diabetic foot infection ([facility] E11.628, L08.9    HTN (hypertension) I10    Diabetic neuropathy (HCC) E11.40    Diabetic foot ulcer ([facility] E11.621, L97.509    Diabetes ([facility] E11.9    CAD (coronary artery disease) I25.10    Cellulitis L03.90    Osteomyelitis of right foot ([facility] M86.9

## 2018-04-03 NOTE — PROGRESS NOTES
Assumed care of patient from Yessy Mccarty, RN    9775 Assessment completed, patient is alert and oriented x's 4, no c/o pain. NSR with AVB on the monitor with a HR in the 80's. Lung fields are clear throughout on RA, 02 sat sustained in the mid 90's with no cough noted. Patient is voiding in the urinal without any complications and tolerating nutrition without any complications. Bilateral feet dressings remain C/D/I. Scheduled medications administered as ordered without any complications. Patient is currently sitting up on side of bed, no s/s of any distress, VSS, call bell and personal belongings within reach, will continue to monitor. 1336 Scheduled medications administered as ordered without any complications. Patient is currently resting quietly in bed, no s/s of any pain or distress, will continue to monitor. 1419 TRANSFER - OUT REPORT:    Verbal report given to Azucena(name) on Alie Marinumb  being transferred to [facility](unit) for ordered procedure       Report consisted of patients Situation, Background, Assessment and   Recommendations(SBAR). Information from the following report(s) SBAR was reviewed with the receiving nurse.     Lines:   PICC Double Lumen 04/63/12 Right;Basilic (Active)   Central Line Being Utilized Yes 4/3/2018 11:41 AM   Criteria for Appropriate Use Long term IV/antibiotic administration 4/3/2018 11:41 AM   Site Assessment Clean, dry, & intact 4/3/2018 11:41 AM   Phlebitis Assessment 0 4/3/2018 11:41 AM   Infiltration Assessment 0 4/3/2018 11:41 AM   Date of Last Dressing Change 03/30/18 4/3/2018 11:41 AM   Dressing Status Clean, dry, & intact 4/3/2018 11:41 AM   Action Taken Open ports on tubing capped 4/3/2018  3:36 AM   Dressing Type Transparent 4/3/2018 11:41 AM   Hub Color/Line Status Blue 4/3/2018 11:41 AM   Positive Blood Return (Site #1) Yes 4/3/2018 11:41 AM   Hub Color/Line Status Red 4/3/2018 11:41 AM   Positive Blood Return (Site #2) Yes 4/3/2018 11:41 AM   Alcohol Cap Used Yes 4/3/2018 11:41 AM        Opportunity for questions and clarification was provided. Patient transported with:   Monitor    1722 patient was not able to complete scheduled cath procedure due to c/o pain to the right groin. Patient will return back to unit. 1740 patient returned back to unit from cath lab via bed in stable condition and no c/o pain. Scheduled medications administered without any complications. Patient is currently sitting up on side of bed anticipating delivery of dinner tray with spouse at bedside, no s/s of any distress, will continue to monitor    1934 Bedside and Verbal shift change report given to Yola Andrea RN (oncoming nurse) by Zackary Snellen, RN (offgoing nurse). Report included the following information SBAR.

## 2018-04-03 NOTE — PROGRESS NOTES
D/c plan anticipate d/c in next 24-48 hours  Plan for cardiac cath today. Met with patient and IDR team. Monica Mckeon has ordered the cam boot for patient. Patient was made aware that the cost of the knee walker is $150.00 rental states he is not going to order if it cost that much. Patient has been accepted by [facility] when he is ready for d/c  Care Management Interventions  PCP Verified by CM: Yes  Mode of Transport at Discharge:  Other (see comment) (family)  Transition of Care Consult (CM Consult): SNF  Partner SNF: Yes  Health Maintenance Reviewed: Yes  Physical Therapy Consult: Yes  Occupational Therapy Consult: Yes  Current Support Network: Lives with Spouse  Confirm Follow Up Transport: Family  Plan discussed with Pt/Family/Caregiver: Yes  Freedom of Choice Offered: Yes  Discharge Location  Discharge Placement: Skilled nursing facility

## 2018-04-03 NOTE — PROGRESS NOTES
Pt complains of sudden onset of stabbing pain in upper right thigh area radiating to above right knee area , increases with palpitation, warm blanket applied to site. no visual abnormalities noted, groin pulse and post tibial pulse palpable  Dr Michelle Estevez notified, states give pt pain med,

## 2018-04-03 NOTE — PROGRESS NOTES
0053-Resting quietly in bed. Stable. Ace wrap in place to left and right foot; intact, no drainage. Positive sensation, movement, and dorsalis pedis pulses bilaterally. Tele-box 12 in place. Call light and personal items within reach. Assessment complete. 0336-Blood drawn via PICC line. 0445-No change from initial assessment. Stable. Shift Summary:  Rested quietly. Uneventful shift.

## 2018-04-03 NOTE — ROUTINE PROCESS
Bedside and Verbal shift change report given to WHITNEY Canales RN (oncoming nurse) by POLLY Severino RN (offgoing nurse). Report included the following information SBAR, Kardex, Intake/Output, MAR and Recent Results.

## 2018-04-03 NOTE — PROGRESS NOTES
Cath lab here to take pt to lab, pain med held,   Will medicate and sedate in lab,   Pt still having pain in right upper thigh area, pt very specific on site and able to point to area, states stabbing pain that radiates behind right knee. Dr Aliza Sandoval made aware.   Kimberley Dorantes from vascular into do scan on right leg,  Cath on hold at this time due to pain of unknown origin,  Dr Carlyle Nyhan into see pt

## 2018-04-03 NOTE — ROUTINE PROCESS
Bedside and Verbal shift change report given to Isabella Badillo RN (oncoming nurse) by Daryl Rodriguez RN (offgoing nurse). Report included the following information SBAR and Kardex.

## 2018-04-03 NOTE — ROUTINE PROCESS
Bedside and Verbal shift change report given to Adelia Aguilar RN (oncoming nurse) by Renetta Connell RN (offgoing nurse). Report included the following information SBAR and Kardex.

## 2018-04-03 NOTE — PROGRESS NOTES
Cardiology Progress Note        Patient: Carolina Rowe        Sex: male          DOA: 3/21/2018  YOB: 1945      Age:  71 y.o.        LOS:  LOS: 13 days    Patient seen and examined, chart reviewed    Assessment/Plan     Patient Active Problem List   Diagnosis Code    Diabetic foot infection ([facility] E11.628, L08.9    HTN (hypertension) I10    Di[facility] neuropathy ([facility] E11.40    Diabetic foot ulcer ([facility] E11.621, L97.509    Diabetes ([facility] E11.9    CAD (coronary artery disease) I25.10    Cellulitis L03.90    Osteomyelitis of right foot ([facility] M86.9      CAD, history of CABG, history of PCI  Chest pain  Abnormal troponin  Abnormal stress test  Right thigh pain  Anemia   Plan:    Conntinue aspirin, beta blocker and statin. Patient was scheduled for LEFT heart catheterization and coronary angiogram plus or minus PCI today. In view of acute right thigh pain will hold off procedure. Patient needs further workup for acute thigh pain. Continue management as per hospital medicine  Plan discussed with patient and family member at bedside. Subjective:    cc:  My RIGHT thigh hurts, Had chest pain this AM, denies any chest pain now      REVIEW OF SYSTEMS:     General: No fevers or chills. Cardiovascular: Postive chest pain,No palpitations, No orthopnea, No PND, No leg swelling  Pulmonary: No shortness of breath. Gastrointestinal: No nausea, vomiting, bleeding  Neurology: No Dizziness    Objective:      Visit Vitals    /60    Pulse 84    Temp 97.9 °F (36.6 °C)    Resp 22    Ht 6' 1\" (1.854 m)    Wt 122 kg (268 lb 15.4 oz)    SpO2 96%    BMI 35.49 kg/m2     Body mass index is 35.49 kg/(m^2). Physical Exam:  General Appearance: Comfortable, not using accessory muscles of respiration. HEENT: SORIN. HEAD: Atraumatic  NECK: No JVD, no thyroidomeglay. CAROTIDS: No bruit  LUNGS: Clear bilaterally.    HEART: S1+S2 audible, no murmur, no pericardial rub.     ABD: Non-tender, BS Audible    EXT: No edema, and no cyanosis.   NEUROLOGICAL: AAO times 3, No motor and sensory deficit  Medication:  Current Facility-Administered Medications   Medication Dose Route Frequency    eptifibatide (INTEGRILIN) 0.75 mg/mL infusion  2 mcg/kg/min IntraVENous CONTINUOUS    0.9% sodium chloride infusion  50 mL/hr IntraVENous CONTINUOUS    isosorbide dinitrate (ISORDIL) tablet 20 mg  20 mg Oral TID    insulin lispro (HUMALOG) injection 18 Units  18 Units SubCUTAneous TID WITH MEALS    nitroglycerin (NITROSTAT) tablet 0.4 mg  0.4 mg SubLINGual PRN    HYDROcodone-acetaminophen (NORCO) 5-325 mg per tablet 1 Tab  1 Tab Oral Q6H PRN    carvedilol (COREG) tablet 3.125 mg  3.125 mg Oral BID WITH MEALS    atorvastatin (LIPITOR) tablet 40 mg  40 mg Oral QHS    clotrimazole (LOTRIMIN) 1 % cream   Topical BID    piperacillin-tazobactam (ZOSYN) 3.375 g in 0.9% sodium chloride (MBP/ADV) 100 mL MBP  3.375 g IntraVENous Q6H    vancomycin (VANCOCIN) 1750 mg in  ml infusion  1,750 mg IntraVENous Q12H    sodium hypochlorite (QUARTER STRENGTH DAKIN'S) 0.125% irrigation (bottle)   Topical Q MON, WED & SUN    guaiFENesin (ROBITUSSIN) 100 mg/5 mL oral liquid 100 mg  100 mg Oral Q4H PRN    insulin lispro (HUMALOG) injection   SubCUTAneous AC&HS    naloxone (NARCAN) injection 0.4 mg  0.4 mg IntraVENous PRN    aspirin chewable tablet 81 mg  81 mg Oral DAILY    sodium chloride (NS) flush 5-10 mL  5-10 mL IntraVENous PRN    acetaminophen (TYLENOL) tablet 650 mg  650 mg Oral Q4H PRN    glucose chewable tablet 16 g  16 g Oral PRN    glucagon (GLUCAGEN) injection 1 mg  1 mg IntraMUSCular PRN    dextrose (D50W) injection syrg 25 g  50 mL IntraVENous PRN    Vancomycin pharamcy to dose  1 Each Other Rx Dosing/Monitoring    insulin glargine (LANTUS) injection 30 Units  30 Units SubCUTAneous DAILY    polyethylene glycol (MIRALAX) packet 17 g  17 g Oral DAILY               Lab/Data Reviewed:       Recent Labs      04/03/18   0336  04/02/18   0240   WBC  6.7  7.0   HGB  9.4*  9.9*   HCT  29.7*  30.8*   PLT  192  210     Recent Labs      04/03/18 0336  04/02/18 0240  04/01/18   0730   NA  148*  143  142   K  3.7  3.9  3.8   CL  112*  109*  109*   CO2  27  28  29   GLU  110*  205*  151*   BUN  10  12  9   CREA  1.06  1.04  1.02   CA  8.0*  8.1*  8.0*       Signed By: Trinidad Cobb MD     April 3, 2018

## 2018-04-04 LAB
GLUCOSE BLD STRIP.AUTO-MCNC: 130 MG/DL (ref 70–110)
GLUCOSE BLD STRIP.AUTO-MCNC: 157 MG/DL (ref 70–110)
GLUCOSE BLD STRIP.AUTO-MCNC: 234 MG/DL (ref 70–110)
GLUCOSE BLD STRIP.AUTO-MCNC: 247 MG/DL (ref 70–110)
GLUCOSE BLD STRIP.AUTO-MCNC: 295 MG/DL (ref 70–110)

## 2018-04-04 PROCEDURE — 74011250636 HC RX REV CODE- 250/636: Performed by: INTERNAL MEDICINE

## 2018-04-04 PROCEDURE — 74011636637 HC RX REV CODE- 636/637: Performed by: INTERNAL MEDICINE

## 2018-04-04 PROCEDURE — 65660000000 HC RM CCU STEPDOWN

## 2018-04-04 PROCEDURE — 74011250637 HC RX REV CODE- 250/637: Performed by: INTERNAL MEDICINE

## 2018-04-04 PROCEDURE — 82962 GLUCOSE BLOOD TEST: CPT

## 2018-04-04 PROCEDURE — 74011636637 HC RX REV CODE- 636/637: Performed by: HOSPITALIST

## 2018-04-04 PROCEDURE — 74011000258 HC RX REV CODE- 258: Performed by: HOSPITALIST

## 2018-04-04 PROCEDURE — 74011250637 HC RX REV CODE- 250/637: Performed by: PHYSICIAN ASSISTANT

## 2018-04-04 PROCEDURE — 97530 THERAPEUTIC ACTIVITIES: CPT

## 2018-04-04 PROCEDURE — 74011250636 HC RX REV CODE- 250/636: Performed by: HOSPITALIST

## 2018-04-04 PROCEDURE — 74011250637 HC RX REV CODE- 250/637: Performed by: HOSPITALIST

## 2018-04-04 PROCEDURE — 74011636637 HC RX REV CODE- 636/637: Performed by: PHYSICIAN ASSISTANT

## 2018-04-04 RX ORDER — ENOXAPARIN SODIUM 100 MG/ML
40 INJECTION SUBCUTANEOUS EVERY 24 HOURS
Status: DISCONTINUED | OUTPATIENT
Start: 2018-04-04 | End: 2018-04-05 | Stop reason: HOSPADM

## 2018-04-04 RX ADMIN — SODIUM HYPOCHLORITE: 1.25 SOLUTION TOPICAL at 21:35

## 2018-04-04 RX ADMIN — VANCOMYCIN HYDROCHLORIDE 1750 MG: 10 INJECTION, POWDER, LYOPHILIZED, FOR SOLUTION INTRAVENOUS at 12:36

## 2018-04-04 RX ADMIN — GUAIFENESIN 100 MG: 200 SOLUTION ORAL at 18:51

## 2018-04-04 RX ADMIN — INSULIN LISPRO 18 UNITS: 100 INJECTION, SOLUTION INTRAVENOUS; SUBCUTANEOUS at 09:42

## 2018-04-04 RX ADMIN — ISOSORBIDE DINITRATE 20 MG: 20 TABLET ORAL at 21:33

## 2018-04-04 RX ADMIN — INSULIN LISPRO 18 UNITS: 100 INJECTION, SOLUTION INTRAVENOUS; SUBCUTANEOUS at 18:49

## 2018-04-04 RX ADMIN — ISOSORBIDE DINITRATE 20 MG: 20 TABLET ORAL at 18:49

## 2018-04-04 RX ADMIN — ISOSORBIDE DINITRATE 20 MG: 20 TABLET ORAL at 09:41

## 2018-04-04 RX ADMIN — INSULIN LISPRO 18 UNITS: 100 INJECTION, SOLUTION INTRAVENOUS; SUBCUTANEOUS at 12:38

## 2018-04-04 RX ADMIN — CLOTRIMAZOLE: 10 CREAM TOPICAL at 21:42

## 2018-04-04 RX ADMIN — GUAIFENESIN 100 MG: 200 SOLUTION ORAL at 06:44

## 2018-04-04 RX ADMIN — CARVEDILOL 3.12 MG: 3.12 TABLET, FILM COATED ORAL at 18:49

## 2018-04-04 RX ADMIN — ATORVASTATIN CALCIUM 40 MG: 20 TABLET, FILM COATED ORAL at 21:33

## 2018-04-04 RX ADMIN — INSULIN LISPRO 6 UNITS: 100 INJECTION, SOLUTION INTRAVENOUS; SUBCUTANEOUS at 21:33

## 2018-04-04 RX ADMIN — INSULIN GLARGINE 30 UNITS: 100 INJECTION, SOLUTION SUBCUTANEOUS at 09:43

## 2018-04-04 RX ADMIN — CARVEDILOL 3.12 MG: 3.12 TABLET, FILM COATED ORAL at 09:41

## 2018-04-04 RX ADMIN — ENOXAPARIN SODIUM 40 MG: 40 INJECTION SUBCUTANEOUS at 12:39

## 2018-04-04 RX ADMIN — PIPERACILLIN SODIUM,TAZOBACTAM SODIUM 3.38 G: 3; .375 INJECTION, POWDER, FOR SOLUTION INTRAVENOUS at 04:05

## 2018-04-04 RX ADMIN — INSULIN LISPRO 6 UNITS: 100 INJECTION, SOLUTION INTRAVENOUS; SUBCUTANEOUS at 16:30

## 2018-04-04 RX ADMIN — CLOTRIMAZOLE: 10 CREAM TOPICAL at 12:40

## 2018-04-04 RX ADMIN — ASPIRIN 81 MG 81 MG: 81 TABLET ORAL at 09:41

## 2018-04-04 RX ADMIN — PIPERACILLIN SODIUM,TAZOBACTAM SODIUM 3.38 G: 3; .375 INJECTION, POWDER, FOR SOLUTION INTRAVENOUS at 21:34

## 2018-04-04 RX ADMIN — PIPERACILLIN SODIUM,TAZOBACTAM SODIUM 3.38 G: 3; .375 INJECTION, POWDER, FOR SOLUTION INTRAVENOUS at 09:30

## 2018-04-04 RX ADMIN — POLYETHYLENE GLYCOL 3350 17 G: 17 POWDER, FOR SOLUTION ORAL at 09:44

## 2018-04-04 RX ADMIN — PIPERACILLIN SODIUM,TAZOBACTAM SODIUM 3.38 G: 3; .375 INJECTION, POWDER, FOR SOLUTION INTRAVENOUS at 18:45

## 2018-04-04 RX ADMIN — INSULIN LISPRO 9 UNITS: 100 INJECTION, SOLUTION INTRAVENOUS; SUBCUTANEOUS at 11:30

## 2018-04-04 RX ADMIN — VANCOMYCIN HYDROCHLORIDE 1750 MG: 10 INJECTION, POWDER, LYOPHILIZED, FOR SOLUTION INTRAVENOUS at 00:28

## 2018-04-04 NOTE — ROUTINE PROCESS
0800 Assumed pt care. Received bedside and verbal SBAR from [facility]. No s/s of distress noted. 0900 Pt rates pain 7/10 hip BL. Pt declines medication at this time. Pt stated \" It doesn't help anyway\", \"The strong stuff knocks me out, I don't want to be knocked out\" BLE bandages Clean, dry, and intact. Pt setting to side of bed.  1400 NPO midnight. Cardiac Cath scheduled for tomorrow morning. Pt to be ready at 0600. Bedside and Verbal shift change report given to Bailey Brown RN (oncoming nurse) by Charo Barraza RN (offgoing nurse). Report included the following information SBAR, Kardex, Intake/Output and MAR. Alarm parameters reviewed, on and audible Appropriate for patient clinical condition   Alarm parameters reviewed and opportunities for questions provided.

## 2018-04-04 NOTE — PROGRESS NOTES
Problem: Mobility Impaired (Adult and Pediatric)  Goal: *Acute Goals and Plan of Care (Insert Text)  In 1-7 days pt will be able to perform:  ST.  Bed mobility:  Rolling L to R to L modified independent for positioning. 2.  Supine to sit to supine S with HR for meals. 3.  Sit to stand to sit S with knee scooter in prep for ambulation. LT.  Gait:  Ambulate >100ft S with knee scooter NWB, for home/community mobility. 2.  Stair Negotiation:  Ascend/descend >3 steps CGA with HR NWB on R for home entry. 3.  Patient/Family Education:  Patient/family to be independent with HEP for follow-up care and safe discharge. 3.  Sit to stand to sit S with knee scooter in prep for ambulation. Goals reviewed and updated 2018. Pt will be able to perform following in 1-7 days:  ST/LT  1. Transfer supine <>sit mod I for independence. 2.  Transfer sit <>stand S adhereing to R NWB to ensure safety and independence. 3.  Gait:  Ambulate >50ft S with knee scooter/RW R NWB, for home/community mobility. 4.  Patient/Family Education:  Patient/family to be independent with HEP for follow-up care and safe discharge. Outcome: Not Progressing Towards Goal  physical Therapy TREATMENT    Patient: Dolores Gilliland (38 y.o. male)  Date: 2018  Diagnosis: Diabetic foot infection ([address]  infected right foot Diabetic foot infection ([address]  Procedure(s) (LRB):  RIGHT FOOT INCISION/DRAINAGE, BONE BIOPSY, AND RESECTION OF INFECTED BONE RIGHT FOOT (Right) 12 Days Post-Op  Precautions: Fall, NWB   Chart, physical therapy assessment, plan of care and goals were reviewed. ASSESSMENT:  Pt is resistant to any mobility, with his focus being on yesterday's events in cath Lab. Pt hold numerous circular conversations, which lead to refusal to stand or ambulate. amb to convince to perform lateral scooting without assist. Pt will be needed, but pt's participation will need to improve.    Progression toward goals:  []      Improving appropriately and progressing toward goals  [x]      Improving slowly and progressing toward goals  []      Not making progress toward goals and plan of care will be adjusted     PLAN:  Patient continues to benefit from skilled intervention to address the above impairments. Continue treatment per established plan of care. Discharge Recommendations:  [facility]  Further Equipment Recommendations for Discharge:  rolling walker and N/A     SUBJECTIVE:   Patient stated I'd rather see what the heart doctor is going to do.     OBJECTIVE DATA SUMMARY:   Critical Behavior:  Neurologic State: Alert  Orientation Level: Oriented X4  Cognition: Follows commands, Appropriate safety awareness, Appropriate for age attention/concentration, Appropriate decision making  Safety/Judgement: Awareness of environment, Fall prevention, Good awareness of safety precautions, Insight into deficits  Functional Mobility Training:  Bed Mobility:  Scooting: Supervision  Balance:  Sitting: Intact  Sitting - Static: Good (unsupported)  Sitting - Dynamic: Fair (occasional)  Pain:  Pain Scale 1: Numeric (0 - 10)  Pain Intensity 1: 6  Pain Location 1: Hip  Pain Orientation 1: Right;Left  Pain Description 1: Aching;Radiating  Pain Intervention(s) 1: Declines  Activity Tolerance:   Poor  Please refer to the flowsheet for vital signs taken during this treatment.   After treatment:   [x] Patient left in no apparent distress sitting EOB  [] Patient left in no apparent distress in bed  [x] Call bell left within reach  [x] Nursing notified  [] Caregiver present  [] Bed alarm activated      Tevin Henderson PTA   Time Calculation: 25 mins

## 2018-04-04 NOTE — PROGRESS NOTES
Hospitalist Progress Note    Patient: Ally Moe MRN: [id number]  CSN: [id number]    YOB: 1945  Age: 71 y.o. Sex: male    DOA: 3/21/2018 LOS:  LOS: 14 days          Chief Complaint:    Cellulitis/Foot Ulcer    Assessment/Plan     1. Cellulitis, Osteomyelitis, DM Foot Infection  2. DM  3. HTN  4. CAD  5. Chest Pain    1. Blood cultures remain with no growth. Wound cultures showing polymicrobial growth. Patient is s/p I&D right foot and resection of infected bone on March 23, 2018. He has been seen by ID. Continue vancomycin and zosyn. Continue to monitor renal function while on listed abx. 2. Lantus 30 units daily, Humalog 18 units qac. SSI, diabetic diet, hypoglycemia protocol. 3. Continue current regimen. BP stable. 4. Continue aspirin. 5. Cardiology on board, stress test was abnormal, and the patient will undergo cardiac cath tomorrow. DVT Prophylaxis - Lovenox  Dispo: Pending cardiac catheterization, further recommendations from cardiology    Patient Active Problem List   Diagnosis Code    Diabetic foot infection ([facility] E11.628, L08.9    HTN (hypertension) I10    Diabetic neuropathy ([address] E11.40    Diabetic foot ulcer ([address] E11.621, L97.509    Diabetes ([address] E11.9    CAD (coronary artery disease) I25.10    Cellulitis L03.90    Osteomyelitis of right foot ([facility] M86.9       Subjective:    Had some thigh pain last night  No other concerns or complaints.      Review of systems:    Constitutional: denies fevers, chills, myalgias  Respiratory: denies SOB, cough  Cardiovascular: denies chest pain, palpitations  Gastrointestinal: denies nausea, vomiting, diarrhea      Vital signs/Intake and Output:  Visit Vitals    /52    Pulse 75    Temp 98.2 °F (36.8 °C)    Resp 18    Ht 6' 1\" (1.854 m)    Wt 122 kg (268 lb 15.4 oz)    SpO2 97%    BMI 35.49 kg/m2     Current Shift:  04/04 0701 - 04/04 1900  In: 240 [P.O.:240]  Out: -   Last three shifts:  04/02 1901 - 04/04 0700  In: 2183.9 [P.O.:960; I.V.:1223.9]  Out: 3575 [Urine:3575]    Exam:    General: Elderly, chronically ill appearing male, alert, NAD, OX3  Head/Neck: NCAT, supple, No masses, No lymphadenopathy  CVS:Regular rate and rhythm, no M/R/G, S1/S2 heard, no thrill  Lungs:Clear to auscultation bilaterally, no wheezes, rhonchi, or rales  Abdomen: Soft, Nontender, No distention, Normal Bowel sounds, No hepatomegaly  Extremities: Bilateral feet wrapped. Skin:normal texture and turgor, no rashes, no lesions  Neuro:grossly normal , follows commands  Psych:appropriate                Labs: Results:       Chemistry Recent Labs      04/03/18 0336 04/02/18   0240   GLU  110*  205*   NA  148*  143   K  3.7  3.9   CL  112*  109*   CO2  27  28   BUN  10  12   CREA  1.06  1.04   CA  8.0*  8.1*   AGAP  9  6   BUCR  9*  12   AP  72   --    TP  6.0*   --    ALB  2.4*   --    GLOB  3.6   --    AGRAT  0.7*   --       CBC w/Diff Recent Labs      04/03/18 0336 04/02/18   0240   WBC  6.7  7.0   RBC  3.14*  3.26*   HGB  9.4*  9.9*   HCT  29.7*  30.8*   PLT  192  210   GRANS  74*   --    LYMPH  14*   --    EOS  2   --       Cardiac Enzymes No results for input(s): CPK, CKND1, NELLA in the last 72 hours. No lab exists for component: CKRMB, TROIP   Coagulation No results for input(s): PTP, INR, APTT in the last 72 hours. No lab exists for component: INREXT    Lipid Panel No results found for: CHOL, CHOLPOCT, CHOLX, CHLST, CHOLV, 427898, HDL, LDL, LDLC, DLDLP, 529489, VLDLC, VLDL, TGLX, TRIGL, TRIGP, TGLPOCT, CHHD, CHHDX   BNP No results for input(s): BNPP in the last 72 hours.    Liver Enzymes Recent Labs      04/03/18 0336   TP  6.0*   ALB  2.4*   AP  72   SGOT  14*      Thyroid Studies No results found for: T4, T3U, TSH, TSHEXT     Procedures/imaging: see electronic medical records for all procedures/Xrays and details which were not copied into this note but were reviewed prior to creation of [address] Lucia Garnica, PA-C

## 2018-04-04 NOTE — PROGRESS NOTES
D/c plan Rehab when cleared medically  royce is planned to have a cardiac cath. He has been accepted at / [place] [id number] at [address] [place] [address]. [phone] for report. When he is ready for d/c. Cm will continue to follow. Patent cam boot has been ordered through  via [employer]. Care Management Interventions  PCP Verified by CM: Yes  Mode of Transport at Discharge:  Other (see comment) (family)  Transition of Care Consult (CM Consult): SNF  Partner SNF: Yes  Health Maintenance Reviewed: Yes  Physical Therapy Consult: Yes  Occupational Therapy Consult: Yes  Current Support Network: Lives with Spouse  Confirm Follow Up Transport: Family  Plan discussed with Pt/Family/Caregiver: Yes  Freedom of Choice Offered: Yes  Discharge Location  Discharge Placement: Skilled nursing facility

## 2018-04-04 NOTE — ROUTINE PROCESS
Bedside and Verbal shift change report given to NAN Cardona (oncoming nurse) by POLLY Severino, RN (offgoing nurse). Report included the following information SBAR, Kardex, Intake/Output, MAR and Recent Results.

## 2018-04-05 VITALS
OXYGEN SATURATION: 96 % | HEIGHT: 73 IN | RESPIRATION RATE: 20 BRPM | SYSTOLIC BLOOD PRESSURE: 136 MMHG | HEART RATE: 68 BPM | WEIGHT: 276.24 LBS | DIASTOLIC BLOOD PRESSURE: 51 MMHG | BODY MASS INDEX: 36.61 KG/M2 | TEMPERATURE: 98.3 F

## 2018-04-05 LAB
ANION GAP SERPL CALC-SCNC: 9 MMOL/L (ref 3–18)
BASOPHILS # BLD: 0.1 K/UL (ref 0–0.06)
BASOPHILS NFR BLD: 1 % (ref 0–2)
BUN SERPL-MCNC: 13 MG/DL (ref 7–18)
BUN/CREAT SERPL: 10 (ref 12–20)
CALCIUM SERPL-MCNC: 8.3 MG/DL (ref 8.5–10.1)
CHLORIDE SERPL-SCNC: 111 MMOL/L (ref 100–108)
CO2 SERPL-SCNC: 25 MMOL/L (ref 21–32)
CREAT SERPL-MCNC: 1.27 MG/DL (ref 0.6–1.3)
DIFFERENTIAL METHOD BLD: ABNORMAL
EOSINOPHIL # BLD: 0.2 K/UL (ref 0–0.4)
EOSINOPHIL NFR BLD: 4 % (ref 0–5)
ERYTHROCYTE [DISTWIDTH] IN BLOOD BY AUTOMATED COUNT: 14.1 % (ref 11.6–14.5)
GLUCOSE BLD STRIP.AUTO-MCNC: 108 MG/DL (ref 70–110)
GLUCOSE BLD STRIP.AUTO-MCNC: 116 MG/DL (ref 70–110)
GLUCOSE BLD STRIP.AUTO-MCNC: 117 MG/DL (ref 70–110)
GLUCOSE BLD STRIP.AUTO-MCNC: 124 MG/DL (ref 70–110)
GLUCOSE SERPL-MCNC: 110 MG/DL (ref 74–99)
HCT VFR BLD AUTO: 29.6 % (ref 36–48)
HGB BLD-MCNC: 9.3 G/DL (ref 13–16)
LYMPHOCYTES # BLD: 1.1 K/UL (ref 0.9–3.6)
LYMPHOCYTES NFR BLD: 16 % (ref 21–52)
MCH RBC QN AUTO: 29.6 PG (ref 24–34)
MCHC RBC AUTO-ENTMCNC: 31.4 G/DL (ref 31–37)
MCV RBC AUTO: 94.3 FL (ref 74–97)
MONOCYTES # BLD: 0.5 K/UL (ref 0.05–1.2)
MONOCYTES NFR BLD: 8 % (ref 3–10)
NEUTS SEG # BLD: 4.8 K/UL (ref 1.8–8)
NEUTS SEG NFR BLD: 71 % (ref 40–73)
PLATELET # BLD AUTO: 195 K/UL (ref 135–420)
PMV BLD AUTO: 10.1 FL (ref 9.2–11.8)
POTASSIUM SERPL-SCNC: 3.6 MMOL/L (ref 3.5–5.5)
RBC # BLD AUTO: 3.14 M/UL (ref 4.7–5.5)
SODIUM SERPL-SCNC: 145 MMOL/L (ref 136–145)
WBC # BLD AUTO: 6.7 K/UL (ref 4.6–13.2)

## 2018-04-05 PROCEDURE — 74011000250 HC RX REV CODE- 250

## 2018-04-05 PROCEDURE — 80048 BASIC METABOLIC PNL TOTAL CA: CPT | Performed by: HOSPITALIST

## 2018-04-05 PROCEDURE — 74011250637 HC RX REV CODE- 250/637: Performed by: INTERNAL MEDICINE

## 2018-04-05 PROCEDURE — 74011250637 HC RX REV CODE- 250/637: Performed by: HOSPITALIST

## 2018-04-05 PROCEDURE — 74011636637 HC RX REV CODE- 636/637: Performed by: PHYSICIAN ASSISTANT

## 2018-04-05 PROCEDURE — B2181ZZ FLUOROSCOPY OF LEFT INTERNAL MAMMARY BYPASS GRAFT USING LOW OSMOLAR CONTRAST: ICD-10-PCS | Performed by: INTERNAL MEDICINE

## 2018-04-05 PROCEDURE — 74011250636 HC RX REV CODE- 250/636

## 2018-04-05 PROCEDURE — 74011250636 HC RX REV CODE- 250/636: Performed by: HOSPITALIST

## 2018-04-05 PROCEDURE — 82962 GLUCOSE BLOOD TEST: CPT

## 2018-04-05 PROCEDURE — 77030010221 CARDIAC CATHETERIZATION

## 2018-04-05 PROCEDURE — 74011000258 HC RX REV CODE- 258: Performed by: HOSPITALIST

## 2018-04-05 PROCEDURE — B2111ZZ FLUOROSCOPY OF MULTIPLE CORONARY ARTERIES USING LOW OSMOLAR CONTRAST: ICD-10-PCS | Performed by: INTERNAL MEDICINE

## 2018-04-05 PROCEDURE — 74011636637 HC RX REV CODE- 636/637: Performed by: INTERNAL MEDICINE

## 2018-04-05 PROCEDURE — 74011000250 HC RX REV CODE- 250: Performed by: INTERNAL MEDICINE

## 2018-04-05 PROCEDURE — 74011636320 HC RX REV CODE- 636/320: Performed by: INTERNAL MEDICINE

## 2018-04-05 PROCEDURE — 85025 COMPLETE CBC W/AUTO DIFF WBC: CPT | Performed by: HOSPITALIST

## 2018-04-05 PROCEDURE — 74011250636 HC RX REV CODE- 250/636: Performed by: INTERNAL MEDICINE

## 2018-04-05 PROCEDURE — 36415 COLL VENOUS BLD VENIPUNCTURE: CPT | Performed by: HOSPITALIST

## 2018-04-05 PROCEDURE — B2131ZZ FLUOROSCOPY OF MULTIPLE CORONARY ARTERY BYPASS GRAFTS USING LOW OSMOLAR CONTRAST: ICD-10-PCS | Performed by: INTERNAL MEDICINE

## 2018-04-05 PROCEDURE — 4A023N7 MEASUREMENT OF CARDIAC SAMPLING AND PRESSURE, LEFT HEART, PERCUTANEOUS APPROACH: ICD-10-PCS | Performed by: INTERNAL MEDICINE

## 2018-04-05 RX ORDER — METOPROLOL TARTRATE 25 MG/1
25 TABLET, FILM COATED ORAL EVERY 12 HOURS
Status: DISCONTINUED | OUTPATIENT
Start: 2018-04-05 | End: 2018-04-05 | Stop reason: HOSPADM

## 2018-04-05 RX ORDER — ISOSORBIDE MONONITRATE 60 MG/1
60 TABLET, EXTENDED RELEASE ORAL DAILY
Status: DISCONTINUED | OUTPATIENT
Start: 2018-04-05 | End: 2018-04-05 | Stop reason: HOSPADM

## 2018-04-05 RX ORDER — LIDOCAINE HYDROCHLORIDE 10 MG/ML
INJECTION, SOLUTION EPIDURAL; INFILTRATION; INTRACAUDAL; PERINEURAL
Status: DISCONTINUED
Start: 2018-04-05 | End: 2018-04-05 | Stop reason: HOSPADM

## 2018-04-05 RX ORDER — SAME BUTANEDISULFONATE/BETAINE 400-600 MG
250 POWDER IN PACKET (EA) ORAL 2 TIMES DAILY
Qty: 14 CAP | Refills: 0 | Status: SHIPPED | OUTPATIENT
Start: 2018-04-05 | End: 2018-04-12

## 2018-04-05 RX ORDER — HEPARIN SODIUM 200 [USP'U]/100ML
500 INJECTION, SOLUTION INTRAVENOUS
Status: DISCONTINUED | OUTPATIENT
Start: 2018-04-05 | End: 2018-04-05 | Stop reason: HOSPADM

## 2018-04-05 RX ORDER — FENTANYL CITRATE 50 UG/ML
25-100 INJECTION, SOLUTION INTRAMUSCULAR; INTRAVENOUS
Status: DISCONTINUED | OUTPATIENT
Start: 2018-04-05 | End: 2018-04-05 | Stop reason: HOSPADM

## 2018-04-05 RX ORDER — FENTANYL CITRATE 50 UG/ML
INJECTION, SOLUTION INTRAMUSCULAR; INTRAVENOUS
Status: COMPLETED
Start: 2018-04-05 | End: 2018-04-05

## 2018-04-05 RX ORDER — LIDOCAINE HYDROCHLORIDE 10 MG/ML
10-30 INJECTION INFILTRATION; PERINEURAL
Status: DISCONTINUED | OUTPATIENT
Start: 2018-04-05 | End: 2018-04-05 | Stop reason: HOSPADM

## 2018-04-05 RX ORDER — HEPARIN SODIUM 200 [USP'U]/100ML
INJECTION, SOLUTION INTRAVENOUS
Status: DISCONTINUED
Start: 2018-04-05 | End: 2018-04-05 | Stop reason: HOSPADM

## 2018-04-05 RX ORDER — HEPARIN SODIUM 1000 [USP'U]/ML
4000 INJECTION, SOLUTION INTRAVENOUS; SUBCUTANEOUS
Status: DISCONTINUED | OUTPATIENT
Start: 2018-04-05 | End: 2018-04-05 | Stop reason: HOSPADM

## 2018-04-05 RX ORDER — SODIUM CHLORIDE 9 MG/ML
50 INJECTION, SOLUTION INTRAVENOUS CONTINUOUS
Status: DISPENSED | OUTPATIENT
Start: 2018-04-05 | End: 2018-04-05

## 2018-04-05 RX ORDER — ISOSORBIDE MONONITRATE 60 MG/1
60 TABLET, EXTENDED RELEASE ORAL DAILY
Qty: 30 TAB | Refills: 0 | Status: SHIPPED
Start: 2018-04-06

## 2018-04-05 RX ORDER — MIDAZOLAM HYDROCHLORIDE 1 MG/ML
INJECTION, SOLUTION INTRAMUSCULAR; INTRAVENOUS
Status: COMPLETED
Start: 2018-04-05 | End: 2018-04-05

## 2018-04-05 RX ORDER — ATORVASTATIN CALCIUM 40 MG/1
40 TABLET, FILM COATED ORAL
Qty: 30 TAB | Refills: 0 | Status: SHIPPED
Start: 2018-04-05 | End: 2021-07-07

## 2018-04-05 RX ORDER — FUROSEMIDE 40 MG/1
40 TABLET ORAL DAILY
Qty: 30 TAB | Refills: 0 | Status: SHIPPED
Start: 2018-04-05 | End: 2018-04-13

## 2018-04-05 RX ORDER — FUROSEMIDE 40 MG/1
40 TABLET ORAL DAILY
Status: DISCONTINUED | OUTPATIENT
Start: 2018-04-05 | End: 2018-04-05 | Stop reason: HOSPADM

## 2018-04-05 RX ORDER — SODIUM CHLORIDE 0.9 % (FLUSH) 0.9 %
5-10 SYRINGE (ML) INJECTION AS NEEDED
Status: CANCELLED | OUTPATIENT
Start: 2018-04-05

## 2018-04-05 RX ORDER — NITROGLYCERIN 5 MG/ML
INJECTION, SOLUTION INTRAVENOUS
Status: DISCONTINUED
Start: 2018-04-05 | End: 2018-04-05 | Stop reason: HOSPADM

## 2018-04-05 RX ORDER — METOPROLOL TARTRATE 25 MG/1
25 TABLET, FILM COATED ORAL EVERY 12 HOURS
Qty: 60 TAB | Refills: 0 | Status: SHIPPED
Start: 2018-04-05 | End: 2021-07-07

## 2018-04-05 RX ORDER — LIDOCAINE HYDROCHLORIDE 10 MG/ML
3-20 INJECTION, SOLUTION EPIDURAL; INFILTRATION; INTRACAUDAL; PERINEURAL
Status: DISCONTINUED | OUTPATIENT
Start: 2018-04-05 | End: 2018-04-05 | Stop reason: HOSPADM

## 2018-04-05 RX ORDER — EPTIFIBATIDE 2 MG/ML
180 INJECTION, SOLUTION INTRAVENOUS
Status: DISCONTINUED | OUTPATIENT
Start: 2018-04-05 | End: 2018-04-05 | Stop reason: HOSPADM

## 2018-04-05 RX ORDER — VERAPAMIL HYDROCHLORIDE 2.5 MG/ML
INJECTION, SOLUTION INTRAVENOUS
Status: DISCONTINUED
Start: 2018-04-05 | End: 2018-04-05 | Stop reason: HOSPADM

## 2018-04-05 RX ORDER — EPTIFIBATIDE 0.75 MG/ML
2 INJECTION, SOLUTION INTRAVENOUS CONTINUOUS
Status: DISCONTINUED | OUTPATIENT
Start: 2018-04-05 | End: 2018-04-05

## 2018-04-05 RX ORDER — VANCOMYCIN 1.75 GRAM/500 ML IN 0.9 % SODIUM CHLORIDE INTRAVENOUS
1750 EVERY 12 HOURS
Qty: 500 ML | Refills: 0 | Status: SHIPPED | OUTPATIENT
Start: 2018-04-05 | End: 2018-05-04

## 2018-04-05 RX ORDER — SODIUM CHLORIDE 0.9 % (FLUSH) 0.9 %
5-10 SYRINGE (ML) INJECTION EVERY 8 HOURS
Status: CANCELLED | OUTPATIENT
Start: 2018-04-05

## 2018-04-05 RX ORDER — HEPARIN SODIUM 1000 [USP'U]/ML
INJECTION, SOLUTION INTRAVENOUS; SUBCUTANEOUS
Status: COMPLETED
Start: 2018-04-05 | End: 2018-04-05

## 2018-04-05 RX ORDER — LIDOCAINE HYDROCHLORIDE 10 MG/ML
3-10 INJECTION, SOLUTION EPIDURAL; INFILTRATION; INTRACAUDAL; PERINEURAL ONCE
Status: COMPLETED | OUTPATIENT
Start: 2018-04-05 | End: 2018-04-05

## 2018-04-05 RX ORDER — MIDAZOLAM HYDROCHLORIDE 1 MG/ML
.5-2 INJECTION, SOLUTION INTRAMUSCULAR; INTRAVENOUS
Status: DISCONTINUED | OUTPATIENT
Start: 2018-04-05 | End: 2018-04-05 | Stop reason: HOSPADM

## 2018-04-05 RX ADMIN — SODIUM CHLORIDE 50 ML/HR: 900 INJECTION, SOLUTION INTRAVENOUS at 08:43

## 2018-04-05 RX ADMIN — IOPAMIDOL 95 ML: 510 INJECTION, SOLUTION INTRAVASCULAR at 07:57

## 2018-04-05 RX ADMIN — LIDOCAINE HYDROCHLORIDE 3 ML: 10 INJECTION, SOLUTION EPIDURAL; INFILTRATION; INTRACAUDAL; PERINEURAL at 07:34

## 2018-04-05 RX ADMIN — VERAPAMIL HYDROCHLORIDE 3 ML: 2.5 INJECTION INTRAVENOUS at 07:34

## 2018-04-05 RX ADMIN — CLOTRIMAZOLE: 10 CREAM TOPICAL at 12:00

## 2018-04-05 RX ADMIN — MIDAZOLAM HYDROCHLORIDE 1 MG: 1 INJECTION, SOLUTION INTRAMUSCULAR; INTRAVENOUS at 07:40

## 2018-04-05 RX ADMIN — Medication 1000 UNITS: at 07:42

## 2018-04-05 RX ADMIN — MIDAZOLAM HYDROCHLORIDE 1 MG: 1 INJECTION, SOLUTION INTRAMUSCULAR; INTRAVENOUS at 07:30

## 2018-04-05 RX ADMIN — VANCOMYCIN HYDROCHLORIDE 1750 MG: 10 INJECTION, POWDER, LYOPHILIZED, FOR SOLUTION INTRAVENOUS at 12:17

## 2018-04-05 RX ADMIN — Medication 1000 UNITS: at 07:28

## 2018-04-05 RX ADMIN — FENTANYL CITRATE 50 MCG: 50 INJECTION, SOLUTION INTRAMUSCULAR; INTRAVENOUS at 07:30

## 2018-04-05 RX ADMIN — FENTANYL CITRATE 50 MCG: 50 INJECTION, SOLUTION INTRAMUSCULAR; INTRAVENOUS at 07:40

## 2018-04-05 RX ADMIN — METOPROLOL TARTRATE 25 MG: 25 TABLET, FILM COATED ORAL at 08:37

## 2018-04-05 RX ADMIN — FENTANYL CITRATE 50 MCG: 50 INJECTION, SOLUTION INTRAMUSCULAR; INTRAVENOUS at 07:59

## 2018-04-05 RX ADMIN — HEPARIN SODIUM 4000 UNITS: 1000 INJECTION, SOLUTION INTRAVENOUS; SUBCUTANEOUS at 07:34

## 2018-04-05 RX ADMIN — Medication 1000 UNITS: at 07:26

## 2018-04-05 RX ADMIN — FUROSEMIDE 40 MG: 40 TABLET ORAL at 12:25

## 2018-04-05 RX ADMIN — GUAIFENESIN 100 MG: 200 SOLUTION ORAL at 10:01

## 2018-04-05 RX ADMIN — ENOXAPARIN SODIUM 40 MG: 40 INJECTION SUBCUTANEOUS at 12:23

## 2018-04-05 RX ADMIN — INSULIN LISPRO 18 UNITS: 100 INJECTION, SOLUTION INTRAVENOUS; SUBCUTANEOUS at 12:25

## 2018-04-05 RX ADMIN — VANCOMYCIN HYDROCHLORIDE 1750 MG: 10 INJECTION, POWDER, LYOPHILIZED, FOR SOLUTION INTRAVENOUS at 01:44

## 2018-04-05 RX ADMIN — ISOSORBIDE MONONITRATE 60 MG: 60 TABLET, EXTENDED RELEASE ORAL at 08:37

## 2018-04-05 RX ADMIN — INSULIN GLARGINE 30 UNITS: 100 INJECTION, SOLUTION SUBCUTANEOUS at 08:38

## 2018-04-05 RX ADMIN — PIPERACILLIN SODIUM,TAZOBACTAM SODIUM 3.38 G: 3; .375 INJECTION, POWDER, FOR SOLUTION INTRAVENOUS at 03:19

## 2018-04-05 RX ADMIN — ASPIRIN 81 MG 81 MG: 81 TABLET ORAL at 08:38

## 2018-04-05 RX ADMIN — GUAIFENESIN 100 MG: 200 SOLUTION ORAL at 00:04

## 2018-04-05 NOTE — PROGRESS NOTES
Cardiology Progress Note        Patient: Quynh Jo        Sex: male          DOA: 3/21/2018  YOB: 1945      Age:  71 y.o.        LOS:  LOS: 14 days    Patient seen and examined, chart reviewed. Assessment/Plan     Patient Active Problem List   Diagnosis Code    Diabetic foot infection ([facility] E11.628, L08.9    HTN (hypertension) I10    Diabetic neuropathy (HCC) E11.40    Diabetic foot ulcer ([facility] E11.621, L97.509    Diabetes ([facility] E11.9    CAD (coronary artery disease) I25.10    Cellulitis L03.90    Osteomyelitis of right foot (HCC) M86.9      CAD, history of CABG, history of PCI  Chest pain  Abnormal troponin  Abnormal stress test  Right thigh pain - resolved   Anemia   Plan:     Conntinue aspirin, beta blocker and statin. Patient was scheduled for LEFT heart catheterization and coronary angiogram plus or minus PCI yesterday and he developed acute right thigh pain and procedure was canceled. He denies any further episode of right thigh pain and CT pelvis and thigh was negative for hematoma. NPO after midnight  Schedule for LEFT heart catheterization, coronary angiogram plus or minus PCI tomorrow. Plan discussed with Dr. Rosia Leventhal discussed with patient and he agree for procedure. Continue management as per hospital medicine                    Subjective:    cc:  Denies any chest pain episode in last 24 hours, denies any thigh pain     REVIEW OF SYSTEMS:     General: No fevers or chills.   Cardiovascular: No chest pain,No palpitations, No orthopnea, No PND, No leg swelling, No claudication  Pulmonary: No dyspnea  Gastrointestinal: No nausea, vomiting, bleeding  Neurology: No Dizziness    Objective:      Visit Vitals    /56 (BP 1 Location: Left arm, BP Patient Position: At rest)    Pulse 77    Temp 98.5 °F (36.9 °C)    Resp 20    Ht 6' 1\" (1.854 m)    Wt 122 kg (268 lb 15.4 oz)    SpO2 98%    BMI 35.49 kg/m2     Body mass index is 35.49 kg/(m^2). Physical Exam:  General Appearance: Comfortable,obese, not using accessory muscles of respiration. HEENT: SORIN. HEAD: Atraumatic  NECK: No JVD, no thyroidomeglay. CAROTIDS: no bruit  LUNGS: Clear bilaterally. HEART: S1+S2 audible, no murmur, no pericardial rub. ABD: Non-tender, BS Audible    NEUROLOGICAL: AAO times 3, No motor and sensory deficit.   Medication:  Current Facility-Administered Medications   Medication Dose Route Frequency    enoxaparin (LOVENOX) injection 40 mg  40 mg SubCUTAneous Q24H    eptifibatide (INTEGRILIN) 0.75 mg/mL infusion  2 mcg/kg/min IntraVENous CONTINUOUS    0.9% sodium chloride infusion  50 mL/hr IntraVENous CONTINUOUS    isosorbide dinitrate (ISORDIL) tablet 20 mg  20 mg Oral TID    insulin lispro (HUMALOG) injection 18 Units  18 Units SubCUTAneous TID WITH MEALS    nitroglycerin (NITROSTAT) tablet 0.4 mg  0.4 mg SubLINGual PRN    HYDROcodone-acetaminophen (NORCO) 5-325 mg per tablet 1 Tab  1 Tab Oral Q6H PRN    carvedilol (COREG) tablet 3.125 mg  3.125 mg Oral BID WITH MEALS    atorvastatin (LIPITOR) tablet 40 mg  40 mg Oral QHS    clotrimazole (LOTRIMIN) 1 % cream   Topical BID    piperacillin-tazobactam (ZOSYN) 3.375 g in 0.9% sodium chloride (MBP/ADV) 100 mL MBP  3.375 g IntraVENous Q6H    vancomycin (VANCOCIN) 1750 mg in  ml infusion  1,750 mg IntraVENous Q12H    sodium hypochlorite (QUARTER STRENGTH DAKIN'S) 0.125% irrigation (bottle)   Topical Q MON, WED & SUN    guaiFENesin (ROBITUSSIN) 100 mg/5 mL oral liquid 100 mg  100 mg Oral Q4H PRN    insulin lispro (HUMALOG) injection   SubCUTAneous AC&HS    naloxone (NARCAN) injection 0.4 mg  0.4 mg IntraVENous PRN    aspirin chewable tablet 81 mg  81 mg Oral DAILY    sodium chloride (NS) flush 5-10 mL  5-10 mL IntraVENous PRN    acetaminophen (TYLENOL) tablet 650 mg  650 mg Oral Q4H PRN    glucose chewable tablet 16 g  16 g Oral PRN    glucagon (GLUCAGEN) injection 1 mg  1 mg IntraMUSCular PRN    dextrose (D50W) injection syrg 25 g  50 mL IntraVENous PRN    Vancomycin pharamcy to dose  1 Each Other Rx Dosing/Monitoring    insulin glargine (LANTUS) injection 30 Units  30 Units SubCUTAneous DAILY    polyethylene glycol (MIRALAX) packet 17 g  17 g Oral DAILY               Lab/Data Reviewed:       Recent Labs      04/03/18   0336  04/02/18   0240   WBC  6.7  7.0   HGB  9.4*  9.9*   HCT  29.7*  30.8*   PLT  192  210     Recent Labs      04/03/18   0336  04/02/18   0240   NA  148*  143   K  3.7  3.9   CL  112*  109*   CO2  27  28   GLU  110*  205*   BUN  10  12   CREA  1.06  1.04   CA  8.0*  8.1*       Signed By: Danya Fontanez MD     April 4, 2018

## 2018-04-05 NOTE — DISCHARGE SUMMARY
Discharge Summary    Patient: Paola Manzano MRN: [id number]  CSN: [id number]    YOB: 1945  Age: 71 y.o. Sex: male    DOA: 3/21/2018 LOS:  LOS: 15 days   Discharge Date: 4/5/2018     Primary Care Provider:  Patrick Yanez MD    Admission Diagnoses: Diabetic foot infection [facility])  infected right foot    Discharge Diagnoses:    Problem List as of 4/5/2018  Date Reviewed: 3/23/2018          Codes Class Noted - Resolved    Osteomyelitis of right foot [facility]) ICD-10-CM: M86.9  ICD-9-CM: 730.27  3/23/2018 - Present        HTN (hypertension) ICD-10-CM: I10  ICD-9-CM: 401.9  Unknown - Present        Diabetic neuropathy (HCC) ICD-10-CM: E11.40  ICD-9-CM: 250.60, 357.2  Unknown - Present        Diabetic foot ulcer ([facility] ICD-10-CM: E11.621, L97.509  ICD-9-CM: 250.80, 707.15  Unknown - Present        Diabetes ([facility] ICD-10-CM: E11.9  ICD-9-CM: 250.00  Unknown - Present        CAD (coronary artery disease) ICD-10-CM: I25.10  ICD-9-CM: 414.00  Unknown - Present        Cellulitis ICD-10-CM: L03.90  ICD-9-CM: 682.9  3/22/2018 - Present        * (Principal)Diabetic foot infection ([facility] ICD-10-CM: E11.628, L08.9  ICD-9-CM: 250.80, 686.9  3/21/2018 - Present        RESOLVED: Abscess of right foot ICD-10-CM: L02.611  ICD-9-CM: 682.7  3/23/2018 - 3/23/2018              Discharge Medications:     Discharge Medication List as of 4/5/2018  1:42 PM      START taking these medications    Details   atorvastatin (LIPITOR) 40 mg tablet Take 1 Tab by mouth nightly., No Print, Disp-30 Tab, R-0      furosemide (LASIX) 40 mg tablet Take 1 Tab by mouth daily. , No Print, Disp-30 Tab, R-0      metoprolol tartrate (LOPRESSOR) 25 mg tablet Take 1 Tab by mouth every twelve (12) hours. , No Print, Disp-60 Tab, R-0      isosorbide mononitrate ER (IMDUR) 60 mg CR tablet Take 1 Tab by mouth daily. , No Print, Disp-30 Tab, R-0      insulin lispro (HUMALOG) 100 unit/mL injection Normal Insulin Sensitivity   For Blood Sugar (mg/dL) of:     Less than 150 =   0 units           150 -199 =   2 units  200 -249 =   4 units  250 -299 =   6 units  300 -349 =   8 units  350 and above =   10 units  If 2 glucose readings are above 200 mg/dL  within a 24 hr period, proceed to \"Very Insul, No Print, Disp-1 Vial, R-0      Florastor 250mg                     Take 1 capsule twice daily for 7 days. CONTINUE these medications which have CHANGED    Details   vancomycin in 0.9% Sodium Cl (VANCOCIN) 1.75 gram/500 mL soln 500 mL by IntraVENous route every twelve (12) hours every twelve (12) hours for 29 days. , Print, Disp-500 mL, R-0      piperacillin-tazobactam 3.375 gram 3.375 g IVPB 3.375 g by IntraVENous route every six (6) hours for 29 days. , No Print, Disp-1 Dose, R-0         CONTINUE these medications which have NOT CHANGED    Details   aspirin 81 mg chewable tablet Take 81 mg by mouth daily. , Historical Med      insulin lispro protamine/insulin lispro (HUMALOG MIX 75-25) 100 unit/mL (75-25) injection 50 Units by SubCUTAneous route two (2) times a day., Historical Med         STOP taking these medications       guaiFENesin (ROBITUSSIN) 100 mg/5 mL liquid Comments:   Reason for Stopping:               Discharge Condition: Stable    Procedures : I&D right foot, Resection of infected bone. Coronary artery angiogram with left heart cath. Consults: Cardiology, Infectious Disease and Podiatry      PHYSICAL EXAM    Visit Vitals    /51 (BP 1 Location: Right arm, BP Patient Position: At rest)    Pulse 68    Temp 98.3 °F (36.8 °C)    Resp 20    Ht 6' 1\" (1.854 m)    Wt 125.3 kg (276 lb 3.8 oz)    SpO2 96%    BMI 36.44 kg/m2     General: Awake, cooperative, no acute distress    HEENT: NC, Atraumatic. PERRLA, EOMI. Anicteric sclerae. Lungs:  CTA Bilaterally. No Wheezing/Rhonchi/Rales. Heart:  Regular  rhythm,  No murmur, No Rubs, No Gallops  Abdomen: Soft, Non distended, Non tender.  +Bowel sounds,   Extremities: Bilateral feet wrapped without drainage. Psych:   Not anxious or agitated. Neurologic:  No acute neurological deficits. Admission HPI : Kiera Patricia is a 71 y.o. male who has past history of CAD, DM, HTN, diabetic neuropathy, diabetic foot ulcer is sent to ER by his podiatrist concerning cellulitis and diabetes foot ulcer. Patient reports that it started with small wound in the plantar aspect of his right mid foot. It gradually started getting worse. He was seen by his podiatrist and was started on amoxacillin, however his wound continued to get worse. He has now developed redness and drainage for the site. He denies any fever/chills. He has diabetic foot ulcer on his right heel, right and left first metatarsal for over 2 years. He called his podiatrist Dr. Lyudmila Cooper and he advised to go to ER. In ER his wbc in normal range and vitals stable    Hospital Course : This patient was admitted to medical services on March 21, 2018 for cellulitis and a diabetic foot infection. Podiatry was consulted and Dr Lyudmila Cooper saw this patient. The patient was initiated on broad spectrum IV abx and ultimately went to the OR for an I&D and resection of infected bone of his right foot. The patient was seen by ID specialists, who recommended that the patient continue on Vancomycin and Zosyn upon discharge until May 4, unless there were margins sent from surgery, which there were not. The patient complained of chest pain, was worked up, had an abnormal stress test, and was then sent for coronary angiogram. The patient had significant coronary disease, but will be managed with aggressive medical management. The patient had a PICC line placed, and will use this for IV access to complete his course of IV abx. The patient will be sent to SNF for further rehab and completion of abx.  The patient is currently in an immobilizer applied to his wrist after the catheterization, which may be removed at approximately 1800 on April 5, 2018 - approximately 24 hours after the procedure. Wound care orders are as follows per Dr Rosado Mealing:  Daily . 5\"iodoform bertha soaked in dakin's solution and packed into plantar foot wounds. Left foot wet to dry betadine solution. Nurses to do both dressings daily. Activity: PT/OT Eval and Treat    Diet: Diabetic Diet    Follow-up: This patient was instructed to follow up with his PCP, his podiatrist, as well as Dr Rachel Agrawal in regards to further needs. Disposition: This patient will be discharged to a SNF in stable condition. Minutes spent on discharge: 45       Labs: Results:       Chemistry Recent Labs      04/05/18 0453 04/03/18   0336   GLU  110*  110*   NA  145  148*   K  3.6  3.7   CL  111*  112*   CO2  25  27   BUN  13  10   CREA  1.27  1.06   CA  8.3*  8.0*   AGAP  9  9   BUCR  10*  9*   AP   --   72   TP   --   6.0*   ALB   --   2.4*   GLOB   --   3.6   AGRAT   --   0.7*      CBC w/Diff Recent Labs      04/05/18 0453 04/03/18 0336   WBC  6.7  6.7   RBC  3.14*  3.14*   HGB  9.3*  9.4*   HCT  29.6*  29.7*   PLT  195  192   GRANS  71  74*   LYMPH  16*  14*   EOS  4  2      Cardiac Enzymes No results for input(s): CPK, CKND1, NELLA in the last 72 hours. No lab exists for component: CKRMB, TROIP   Coagulation No results for input(s): PTP, INR, APTT in the last 72 hours. No lab exists for component: INREXT, INREXT    Lipid Panel No results found for: CHOL, CHOLPOCT, CHOLX, CHLST, CHOLV, 615368, HDL, LDL, LDLC, DLDLP, 537850, VLDLC, VLDL, TGLX, TRIGL, TRIGP, TGLPOCT, CHHD, CHHDX   BNP No results for input(s): BNPP in the last 72 hours. Liver Enzymes Recent Labs      04/03/18   0336   TP  6.0*   ALB  2.4*   AP  72   SGOT  14*      Thyroid Studies No results found for: T4, T3U, TSH, TSHEXT, TSHEXT         Significant Diagnostic Studies: Xr Foot Rt Ap/lat    Result Date: 3/24/2018  Left foot x-ray HISTORY: Postoperative exam. Diabetic abscess/inflammation.  COMPARISON: No prior study available for comparison. FINDINGS: 2 views of the foot were performed. There are postsurgical changes of partial first ray amputation including distal first metatarsal and partial proximal phalangeal resection. Expected postoperative subcutaneous emphysema at the site of surgery. Remaining osseous structures of the foot are intact. IMPRESSION: Expected postoperative subcutaneous emphysema with postsurgical changes of partial first metatarsal and partial first proximal phalangeal amputation. Xr Foot Rt Min 3 V    Result Date: 3/22/2018  Examination: Right foot 3 views. HISTORY: Right foot osteomyelitis. FINDINGS: Dorsal, oblique, and lateral projections of the right foot performed and interpreted without comparison. Plantar ulceration is noted at the level of the first metatarsal head, with joint centered destruction of the great toe proximal phalanx and first metatarsal head. No retained radiopaque foreign object. Heterotopic ossification inferior to the lateral malleolus noted in keeping with sequela of prior sprain. Diffuse soft tissue swelling present. Prominent os trigonum. Achilles insertional enthesopathy noted. Atherosclerotic vascular calcifications are present diffusely. IMPRESSION: 1. Soft tissue ulceration adjacent to the right foot great toe, with associated joint centered destructive changes at the first MTP joint. Findings are in keeping with septic arthritis of the great toe MTP joint and osteomyelitis of the great toe proximal phalanx and distal first metatarsal head. Mri Foot Rt Wo Cont    Result Date: 3/28/2018  Procedure: MRI of the right forefoot, without contrast. Indications: Suspected osteomyelitis. The patient is status post recent incision and drainage with resection of approximately one third of the metatarsal and the entire metatarsophalangeal joint.  Comparisons: Right foot radiographs dated 8/23/2018 Technique:  Coronal long axis T1 and T2 with fat saturation; sagittal T1 and STIR; axial short axis T1 and STIR sequences of the forefoot were obtained. Additional small field-of-view long axis TI and T2 fat sat and sagittal T1 and STIR sequences were also acquired. Findings: There are postsurgical changes in keeping with patient history of partial bowel resection with a surgical margin about the mid first metatarsal diaphysis and resection of the distal half. There is also resection of the base of the first proximal phalanx to include the entire first metatarsophalangeal joint. There is fairly extensive scattered foci of signal void and surrounding indurated low T1 and increased T2/STIR signal in the surgical bed, compatible with residual gas and granulation tissue/fluid. There is edema within the residual first proximal phalanx however, no corresponding low T1 signal. This is nonspecific, likely reactive. Similarly, there is a lesser degree of mild patchy edema along the distal aspect of the residual first metatarsal stump adjacent to the surgical bed. The margin of the first metatarsal stump is slightly more irregular than the first proximal phalanx. No corresponding low T1 signal is seen in the first metatarsal stump. The mild edema is favored to represent reactive change related to recent surgery. The remaining marrow signal is within normal limits. The first flexor and extensor tendons remain intact although the flexor digitorum is moderately thickened with diffuse intermediate signal and poorly visualized on T1 and also demonstrates some patchy increased T2/STIR signal, compatible with tendinosis and/or postsurgical change. The extensor tendon is intact. There is diffuse soft tissue edema throughout the forefoot. Ulcerative lesion containing a small collection of fluid along the plantar surface of the midfoot arch is noted. The fluid collection measures approximately 1.1 x 1.5 x 1.5 cm in size.  There is diffuse edema throughout the intrinsic foot musculature with fatty replacement, most likely chronic changes of diabetic neuropathic myopathy. IMPRESSION: 1. Postsurgical changes in keeping with resection of the base of the first proximal phalanx, first MTP, and distal half of the first metatarsal. There is mild marrow edema along the margins of the first metatarsal stump as well as the surgical margin of the first digit which is nonspecific,  and likely resents reactive postsurgical change without specific MRI evidence of residual osteomyelitis. There is a significant amount of residual gas and/or susceptibility artifact from surgery in the surgical bed with presumed granulation tissue. Residual infection is not entirely excluded, however. 2. Smaller ulcerative lesion along the plantar arch of the midfoot containing a small amount of subcutaneous fluid, potentially abscess. 3. Generalized soft tissue swelling and skin thickening. Recommend correlation for cellulitis. Mri Foot Rt W  Wo Cont    Result Date: 3/22/2018  Examination: MRI right forefoot without and with contrast. HISTORY: Osteomyelitis of the medial right forefoot. TECHNIQUE: MR examination of the right forefoot was performed utilizing a local coil. Coronal and sagittal STIR and T1 images as well as axial T1 and T2 fat-suppressed imaging was obtained before the uneventful intravenous administration of gadolinium contrast (15 mL MultiHance). Postcontrast imaging was performed utilizing T1 fat-suppressed techniques and axial, coronal, and sagittal planes. COMPARISON: Right foot radiographs 3/22/2018. FINDINGS: There is a soft tissue ulcer involving the plantar medial aspect of the right forefoot, adjacent to the first MTP joint. In continuity with this skin ulceration, T1 marrow hypointensity is noted involving the distal aspect of the first metatarsal shaft, sesamoid, as well as the base of the great toe proximal phalanx.  Osseous resorption of the first metatarsal head is noted, in keeping with radiographic findings. On postcontrast imaging, there is abnormal intramedullary enhancement of the distal aspect of the first metatarsal, base of the great toe proximal phalanx, as well as bilateral sesamoids, and keeping with underlying osteomyelitis. There is no evidence of necrotic or nonenhancing bone demonstrated. Small amount of fluid is noted at the residual first MTP joint space with postcontrast enhancement, in keeping with septic arthritis. Skin thickening and enhancement is noted involving the great toe soft tissues, without associated organized or drainable fluid collection. Within the limitations of motion, the tarsometatarsal alignment appears within normal limits. The tarsometatarsal joint spaces appear normal. MTP articulations of the second-fourth toes as well as the interphalangeal joints of the lesser toes are within normal limits. With the exception of the flexor tendons in the region of the plantar plate of the great toe, remaining flexor and extensor tendons appear within normal limits. . There is diffuse intrinsic muscular atrophy, with similarly diffuse abnormal intramuscular edema signal. Slightly more focal atrophy is noted involving the abductor digit minimi muscle belly. No abnormal intramuscular enhancement. Dorsal midfoot and forefoot edema is present. IMPRESSION: 1. Medial right forefoot soft tissue ulcer with associated osteomyelitis of the distal first metatarsal, sesamoids, and great toe proximal phalanx. No evidence of necrotic or nonenhancing bone. Small amount of fluid at the residual joint space is noted to demonstrate mild enhancement, in keeping with associated septic arthritis of the first MTP joint. 2. Skin thickening and enhancement involving the great toe, greatest medially in keeping with associated cellulitis. 3. No organized or drainable fluid collection. 4. Diffusely abnormal intrinsic muscle bulk and signal in keeping with sequela of subacute denervation.      Ct Hip Rt Wo Cont    Result Date: 4/4/2018  Examination: CT right hip without contrast. HISTORY: 71year-old patient with right thigh pain. Evaluation for potential hematoma requested. TECHNIQUE: CT imaging of the pelvis was performed in the axial plane utilizing both bone and soft tissue reconstruction algorithms without intravenous contrast. Additional coronal and sagittal reformatted images were performed by the technologist and are included in interpretation. One or more dose reduction techniques were used on this CT: automated exposure control, adjustment of the mAs and/or kVp according to patient's size, and iterative reconstruction techniques. The specific techniques utilized on this CT exam have been documented in the patient's electronic medical record. . Included portions of the lower lumbar spine demonstrate mild spondylosis and lower lumbar facet joint osteoarthrosis with maintained vertebral body heights. Osseous alignment of the sacroiliac joints is within normal limits bilaterally. There is mild bilateral sacroiliac joint osteoarthritis present. Osseous alignment of the right hip joint is within normal limits. Mild degenerative changes noted. No joint effusion. No fracture. Included evaluation of the left hip joint on the initial volumetric acquisition demonstrates similarly mild degenerative change without acute osseous abnormality or joint effusion. Soft tissues of the upper thighs and lower abdomen/pelvis unremarkable for mild circumferential subcutaneous edema noted. Several prominent right inguinal lymph nodes are noted; however, they maintain normal fatty tricia, and are most in keeping with reactive change given the patient's ongoing right lower extremity infection. Atherosclerotic vascular calcification of the aorta noted. Additional reactive enlarged iliac chain lymph nodes noted on the right. No free pelvic fluid. Normal caliber of the included small and large intestine. IMPRESSION: 1.  No acute osseous or soft tissue abnormality involving the right hip or right thigh. Specifically, no evidence of hematoma formation. Xr Chest Port    Result Date: 3/25/2018  Portable chest xray Reason for exam:SOB Images: 1 Comparison:  None. Findings: The cardiomediastinal contours are accentuated by AP technique. Sternal wires are present. There is moderate elevation of right hemidiaphragm. There is a questionable focus of density at the right upper lobe just inferior to the right hemidiaphragm. Suspect this is superimposition of shadows from the adjacent rib end. Attention to this on follow-up x-rays is recommended. No pneumothorax. No free air. Trachea is midline. Cortical margins are intact. Impression: No infiltrates, CHF or pneumothorax. Moderate elevation of right hemidiaphragm. Irregular focus at right upper lobe is thought to be projectional from adjacent bone. Attention to this on follow-up x-rays is suggested. Nm Cardiac Spect W Strs/rest Mult    Result Date: 4/2/2018  Procedure: Pharmacologic stress testing was performed with Regadenoson . The heart rate was 81 bpm at baseline and jeremy to 91 beats per minute during the Regadenoson infusion. The rest blood pressure was 142/67mm/Hg and increased to 144/67mm/Hg, which is a normal. The patient did not developed significant symptoms. The resting electrocardiogram demonstrated rhythm, first-degree AV block, PVC, nonspecific ST T changes and did not show ST-segment changes consistent with myocardial ischemia. Myocardial perfusion imaging was performed at rest (45 minutes following the injection of 10.5 mCi of MIBI). At peak pharmacologic effect, the patient was injected with 36 mCi of MIBI. Findings: The overall quality of the study is fair. Attenuation corrected images are not available Left ventricular cavity is noted to be normal on the rest and stress studies.  SPECT images demonstrate small perfusion abnormality of moderate severity is present in the apical lateral wall and moderate perfusion abnormality of moderate severity in basal inferior and basal inferoseptal region on the stress images. The rest images reveal_moderate perfusion abnormality of moderate severity in mid inferior region. Gated SPECT imaging reveals normal myocardial thickening and wall motion. The left ventricular ejection fraction was 55%. Impression: Myocardial perfusion imaging is abnormal. There is a small ischemia in apical lateral and moderate ischemia in basal inferior and inferoseptal region. Overall left ventricular systolic function was normal without wall motion abnormality. Estimated LVEF 55%. Cannot comment on exercise capacity due to pharmacological study. Attenuation corrected images are not available. No previous study to compare. Ir Fluoro Guide Picc Insertion    Result Date: 3/30/2018  PICC placement. CLINICAL: IV access for long-term intravenous antibiotics. The procedure was performed following standard maximal barrier technique which includes cap, mask, sterile gown, sterile gloves, sterile full body drape, hand hygiene with alcohol foam, and 2% chlorhexidine for cutaneous antisepsis. Sterile ultrasound gel and a sterile cover for the ultrasound probe was used. Procedure, risks, benefits, and alternatives were discussed with the patient and written, informed consent was obtained. Patient's right arm was prepped. Timeout was observed at 11:25 hours. Lidocaine was used for local anesthesia. Using direct ultrasound visualization a 21 gauge needle was inserted into the right basilic vein which was patent. Permanent ultrasound recording placed in patient's chart. The guide wire was inserted and advanced under fluoroscopic guidance to the caval atrial junction. The distance between the caval atrial junction and skin puncture site was measured and a PICC line was cut to the appropriate length.   The [facility] double lumen PICC was inserted under fluoroscopic guidance. A frontal image was obtained to document catheter position. The catheter was flushed with heparinized saline and a sterile dressing applied. GUIDANCE:  Ultrasound and fluoroscopy guidance was used to position (and confirm the position of) the needle / catheter. Image(s) saved in PACS: Ultrasound and fluoroscopy Fluoro dose (reference air kerma):  8 mGy. Findings: The right basilic vein was patent demonstrating normal grayscale appearance and compression. The tip of the PICC line is in good position in the SVC. Both lumens flushed easily and there is good blood return. Catheter length is 40 cm. IMPRESSION: Successful ultrasound and fluoroscopic guided insertion of a non-tunneled double lumen power PICC line via the right arm. PICC is ready for immediate use. Schuyler Benton Technologist Service    Result Date: 3/26/2018  See impression. Impression:  Fluoroscopy was provided for this procedure under the supervision and/or direction of the attending provider. ? For further information regarding this procedure, see patient medical record. No results found for this or any previous visit.         CC: Sonya Salinas MD

## 2018-04-05 NOTE — DISCHARGE INSTRUCTIONS
Learning About Coronary Artery Disease (CAD)  What is coronary artery disease? Coronary artery disease (CAD) occurs when plaque builds up in the arteries that bring oxygen-rich blood to your heart. Plaque is a fatty substance made of cholesterol, calcium, and other substances in the blood. This process is called hardening of the arteries, or atherosclerosis. What happens when you have coronary artery disease? · Plaque may narrow the coronary arteries. Narrowed arteries cause poor blood flow. This can lead to angina symptoms such as chest pain or discomfort. If blood flow is completely blocked, you could have a heart attack. · You can slow CAD and reduce the risk of future problems by making changes in your lifestyle. These include quitting smoking and eating heart-healthy foods. · Treatments for CAD, along with changes in your lifestyle, can help you live a longer and healthier life. How can you prevent coronary artery disease? · Do not smoke. It may be the best thing you can do to prevent heart disease. If you need help quitting, talk to your doctor about stop-smoking programs and medicines. These can increase your chances of quitting for good. · Be active. Get at least 30 minutes of exercise on most days of the week. Walking is a good choice. You also may want to do other activities, such as running, swimming, cycling, or playing tennis or team sports. · Eat heart-healthy foods. Eat more fruits and vegetables and less foods that contain saturated and trans fats. Limit alcohol, sodium, and sweets. · Stay at a healthy weight. Lose weight if you need to. · Manage other health problems such as diabetes, high blood pressure, and high cholesterol. · Manage stress. Stress can hurt your heart. To keep stress low, talk about your problems and feelings. Don't keep your feelings hidden. · If you have talked about it with your doctor, take a low-dose aspirin every day.  Aspirin can help certain people lower their risk of a heart attack or stroke. But taking aspirin isn't right for everyone, because it can cause serious bleeding. Do not start taking daily aspirin unless your doctor knows about it. How is coronary artery disease treated? · Your doctor will suggest that you make lifestyle changes. For example, your doctor may ask you to eat healthy foods, quit smoking, lose extra weight, and be more active. · You will have to take medicines. · Your doctor may suggest a procedure to open narrowed or blocked arteries. This is called angioplasty. Or your doctor may suggest using healthy blood vessels to create detours around narrowed or blocked arteries. This is called bypass surgery. Follow-up care is a key part of your treatment and safety. Be sure to make and go to all appointments, and call your doctor if you are having problems. It's also a good idea to know your test results and keep a list of the medicines you take. Where can you learn more? Go to http://zuriIdeal Powerjesus.info/. Enter [id number] in the search box to learn more about \"Learning About Coronary Artery Disease (CAD). \"  Current as of: September 21, 2016  Content Version: 11.4  © 1132-4051 [employer]. Care instructions adapted under license by [employer] (which disclaims liability or warranty for this information). If you have questions about a medical condition or this instruction, always ask your healthcare professional. [place] [address] any warranty or liability for your use of this information. Skin Abscess: Care Instructions  Your Care Instructions    A skin abscess is a bacterial infection that forms a pocket of pus. A boil is a kind of skin abscess. The doctor may have cut an opening in the abscess so that the pus can drain out. You may have gauze in the cut so that the abscess will stay open and keep draining. You may need antibiotics.  You will need to follow up with your doctor to make sure the infection has gone away. The doctor has checked you carefully, but problems can develop later. If you notice any problems or new symptoms, get medical treatment right away. Follow-up care is a key part of your treatment and safety. Be sure to make and go to all appointments, and call your doctor if you are having problems. It's also a good idea to know your test results and keep a list of the medicines you take. How can you care for yourself at home? · Apply warm and dry compresses, a heating pad set on low, or a hot water bottle 3 or 4 times a day for pain. Keep a cloth between the heat source and your skin. · If your doctor prescribed antibiotics, take them as directed. Do not stop taking them just because you feel better. You need to take the full course of antibiotics. · Take pain medicines exactly as directed. ¨ If the doctor gave you a prescription medicine for pain, take it as prescribed. ¨ If you are not taking a prescription pain medicine, ask your doctor if you can take an over-the-counter medicine. · Keep your bandage clean and dry. Change the bandage whenever it gets wet or dirty, or at least one time a day. · If the abscess was packed with gauze:  ¨ Keep follow-up appointments to have the gauze changed or removed. If the doctor instructed you to remove the gauze, gently pull out all of the gauze when your doctor tells you to. ¨ After the gauze is removed, soak the area in warm water for 15 to 20 minutes 2 times a day, until the wound closes. When should you call for help? Call your doctor now or seek immediate medical care if:  ? · You have signs of worsening infection, such as:  ¨ Increased pain, swelling, warmth, or redness. ¨ Red streaks leading from the infected skin. ¨ Pus draining from the wound. ¨ A fever. ? Watch closely for changes in your health, and be sure to contact your doctor if:  ? · You do not get better as expected.    Where can you learn more?  Go to http://zuri-jesus.info/. Enter M283 in the search box to learn more about \"Skin Abscess: Care Instructions. \"  Current as of: October 13, 2016  Content Version: 11.4  © 8525-2476 [employer]. Care instructions adapted under license by [employer] (which disclaims liability or warranty for this information). If you have questions about a medical condition or this instruction, always ask your healthcare professional. [address] any warranty or liability for your use of this information. Cellulitis: Care Instructions  Your Care Instructions    Cellulitis is a skin infection. It often occurs after a break in the skin from a scrape, cut, bite, or puncture, or after a rash. The doctor has checked you carefully, but problems can develop later. If you notice any problems or new symptoms, get medical treatment right away. Follow-up care is a key part of your treatment and safety. Be sure to make and go to all appointments, and call your doctor if you are having problems. It's also a good idea to know your test results and keep a list of the medicines you take. How can you care for yourself at home? · Take your antibiotics as directed. Do not stop taking them just because you feel better. You need to take the full course of antibiotics. · Prop up the infected area on pillows to reduce pain and swelling. Try to keep the area above the level of your heart as often as you can. · If your doctor told you how to care for your wound, follow your doctor's instructions. If you did not get instructions, follow this general advice:  ¨ Wash the wound with clean water 2 times a day. Don't use hydrogen peroxide or alcohol, which can slow healing. ¨ You may cover the wound with a thin layer of petroleum jelly, such as Vaseline, and a nonstick bandage. ¨ Apply more petroleum jelly and replace the bandage as needed. · Be safe with medicines. Take pain medicines exactly as directed. ¨ If the doctor gave you a prescription medicine for pain, take it as prescribed. ¨ If you are not taking a prescription pain medicine, ask your doctor if you can take an over-the-counter medicine. To prevent cellulitis in the future  · Try to prevent cuts, scrapes, or other injuries to your skin. Cellulitis most often occurs where there is a break in the skin. · If you get a scrape, cut, mild burn, or bite, wash the wound with clean water as soon as you can to help avoid infection. Don't use hydrogen peroxide or alcohol, which can slow healing. · If you have swelling in your legs (edema), support stockings and good skin care may help prevent leg sores and cellulitis. · Take care of your feet, especially if you have diabetes or other conditions that increase the risk of infection. Wear shoes and socks. Do not go barefoot. If you have athlete's foot or other skin problems on your feet, talk to your doctor about how to treat them. When should you call for help? Call your doctor now or seek immediate medical care if:  ? · You have signs that your infection is getting worse, such as:  ¨ Increased pain, swelling, warmth, or redness. ¨ Red streaks leading from the area. ¨ Pus draining from the area. ¨ A fever. ? · You get a rash. ? Watch closely for changes in your health, and be sure to contact your doctor if:  ? · You are not getting better after 1 day (24 hours). ? · You do not get better as expected. Where can you learn more? Go to http://zuri-jesus.info/. Jarrett Campos in the search box to learn more about \"Cellulitis: Care Instructions. \"  Current as of: October 13, 2016  Content Version: 11.4  © 8126-2982 [employer]. Care instructions adapted under license by [employer] (which disclaims liability or warranty for this information).  If you have questions about a medical condition or this instruction, always ask your healthcare professional. [place] [address] any warranty or liability for your use of this information. Diabetes Foot Health: Care Instructions  Your Care Instructions    When you have diabetes, your feet need extra care and attention. Diabetes can damage the nerve endings and blood vessels in your feet, making you less likely to notice when your feet are injured. Diabetes also limits your body's ability to fight infection and get blood to areas that need it. If you get a minor foot injury, it could become an ulcer or a serious infection. With good foot care, you can prevent most of these problems. Caring for your feet can be quick and easy. Most of the care can be done when you are bathing or getting ready for bed. Follow-up care is a key part of your treatment and safety. Be sure to make and go to all appointments, and call your doctor if you are having problems. It's also a good idea to know your test results and keep a list of the medicines you take. How can you care for yourself at home? · Keep your blood sugar close to normal by watching what and how much you eat, monitoring blood sugar, taking medicines if prescribed, and getting regular exercise. · Do not smoke. Smoking affects blood flow and can make foot problems worse. If you need help quitting, talk to your doctor about stop-smoking programs and medicines. These can increase your chances of quitting for good. · Eat a diet that is low in fats. High fat intake can cause fat to build up in your blood vessels and decrease blood flow. · Inspect your feet daily for blisters, cuts, cracks, or sores. If you cannot see well, use a mirror or have someone help you. · Take care of your feet:  [facility] [place] [place] [employer] your feet every day. Use warm (not hot) water. Check the water temperature with your wrists or other part of your body, not your feet. ¨ Dry your feet well. Pat them dry. Do not rub the skin on your feet too hard.  Dry well between your toes. If the skin on your feet stays moist, bacteria or a fungus can grow, which can lead to infection. ¨ Keep your skin soft. Use moisturizing skin cream to keep the skin on your feet soft and prevent calluses and cracks. But do not put the cream between your toes, and stop using any cream that causes a rash. ¨ Clean underneath your toenails carefully. Do not use a sharp object to clean underneath your toenails. Use the blunt end of a nail file or other rounded tool. ¨ Trim and file your toenails straight across to prevent ingrown toenails. Use a nail clipper, not scissors. Use an emery board to smooth the edges. · Change socks daily. Socks without seams are best, because seams often rub the feet. You can find socks for people with diabetes from specialty catalogs. · Look inside your shoes every day for things like gravel or torn linings, which could cause blisters or sores. · Buy shoes that fit well:  ¨ Look for shoes that have plenty of space around the toes. This helps prevent bunions and blisters. ¨ Try on shoes while wearing the kind of socks you will usually wear with the shoes. ¨ Avoid plastic shoes. They may rub your feet and cause blisters. Good shoes should be made of materials that are flexible and breathable, such as leather or cloth. ¨ Break in new shoes slowly by wearing them for no more than an hour a day for several days. Take extra time to check your feet for red areas, blisters, or other problems after you wear new shoes. · Do not go barefoot. Do not wear sandals, and do not wear shoes with very thin soles. Thin soles are easy to puncture. They also do not protect your feet from hot pavement or cold weather. · Have your doctor check your feet during each visit. If you have a foot problem, see your doctor. Do not try to treat an early foot problem at home. Home remedies or treatments that you can buy without a prescription (such as corn removers) can be harmful.   · Always get early treatment for foot problems. A minor irritation can lead to a major problem if not properly cared for early. When should you call for help? Call your doctor now or seek immediate medical care if:  ? · You have a foot sore, an ulcer or break in the skin that is not healing after 4 days, bleeding corns or calluses, or an ingrown toenail. ? · You have blue or black areas, which can mean bruising or blood flow problems. ? · You have peeling skin or tiny blisters between your toes or cracking or oozing of the skin. ? · You have a fever for more than 24 hours and a foot sore. ? · You have new numbness or tingling in your feet that does not go away after you move your feet or change positions. ? · You have unexplained or unusual swelling of the foot or ankle. ? Watch closely for changes in your health, and be sure to contact your doctor if:  ? · You cannot do proper foot care. Where can you learn more? Go to http://zrui-jesus.info/. Enter A739 in the search box to learn more about \"Diabetes Foot Health: Care Instructions. \"  Current as of: March 13, 2017  Content Version: 11.4  © 0366-6184 [employer]. Care instructions adapted under license by [employer] (which disclaims liability or warranty for this information). If you have questions about a medical condition or this instruction, always ask your healthcare professional. [place] [address] any warranty or liability for your use of this information. HEART CATHETERIZATION/ANGIOGRAPHY DISCHARGE INSTRUCTIONS    1. Check puncture site frequently for swelling or bleeding. If there is any bleeding, lie down and apply pressure over the area with a clean towel or washcloth. Notify your doctor for any redness, swelling, drainage, or oozing from the puncture site. Notify your doctor for any fever or chills. 2. If the extremity becomes cold, numb, or painful go to the emergency room  3.  Activity should be limited for the next 48 hours. Climb stairs as little as possible and avoid any stooping, bending, or strenuous activity for 48 hours. No heavy lifting (anything over 10 pounds) for 3 days. 4. You may resume your usual diet. Drink more fluids than usual.  5. Have a responsible person drive you home and stay with you for at least 24 hours after your heart catheterization/angiography. 6. You may remove bandage from your Left and Arm in 24 hours. You may shower in 24 hours. No tub baths, hot tubs, or swimming for 1 week. Do not place any lotions, creams, powders, or ointments over puncture site for 1 week. You may place a clean band-aid over the puncture site each day for 5 days. Change daily. I have read the above instructions and have had the opportunity to ask questions. Patient: ________________________   Date: 4/3/2018    Witness: _______________________   Date: 4/3/2018      Lab Results   Component Value Date/Time    Hemoglobin A1c 11.3 (H) 03/22/2018 11:02 AM      An A1C of 5.7-6.4% meets the criteria for pre-diabetes; an A1C of 6.5% or higher meets the criteria for diabetes. This lab test reflects that your blood sugar averaged 278 mg/dL over the past 3 months. It is important to follow up with your provider on a routine basis to continue to evaluate your blood sugar and discuss any necessary changes in treatment.

## 2018-04-05 NOTE — ROUTINE PROCESS
0630: TRANSFER - OUT REPORT:    Verbal report given to Fransico on Renaldo Garcia  being transferred to CARE (unit) for ordered procedure       Report consisted of patients Situation, Background, Assessment and   Recommendations(SBAR). Information from the following report(s) SBAR, Kardex, Procedure Summary, Intake/Output, MAR and Recent Results was reviewed with the receiving nurse. Lines:   PICC Double Lumen 29/28/60 Right;Basilic (Active)   Central Line Being Utilized Yes 4/5/2018  4:00 AM   Criteria for Appropriate Use Long term IV/antibiotic administration 4/5/2018  4:00 AM   Site Assessment Clean, dry, & intact 4/5/2018  4:00 AM   Phlebitis Assessment 0 4/5/2018  4:00 AM   Infiltration Assessment 0 4/5/2018  4:00 AM   Date of Last Dressing Change 03/30/18 4/5/2018  4:00 AM   Dressing Status Clean, dry, & intact 4/5/2018  4:00 AM   Action Taken Open ports on tubing capped 4/5/2018  4:00 AM   Dressing Type Disk with Chlorhexadine gluconate (CHG) 4/5/2018  4:00 AM   Hub Color/Line Status Blue 4/5/2018  4:00 AM   Positive Blood Return (Site #1) Yes 4/5/2018  4:00 AM   Hub Color/Line Status Red 4/5/2018  4:00 AM   Positive Blood Return (Site #2) Yes 4/5/2018  4:00 AM   Alcohol Cap Used Yes 4/5/2018  4:00 AM        Opportunity for questions and clarification was provided.       Patient transported with:   Registered Nurse

## 2018-04-05 NOTE — ROUTINE PROCESS
Cardiac Cath Lab:  Pre Procedure Chart Check     Patients chart was accessed and reviewed for possible and/or scheduled procedure. Creatinine Clearance:  CREATININE: 1.27 MG/DL (04/05/18 0453)  Estimated creatinine clearance: 74 mL/min    Total Contrast  Load:  3 x estimated clearance amount=  222ml    75% of Contrast Load:  0.75 x Total Contrast Load=    166.5ml    Recent Labs      04/05/18   0453   WBC  6.7   RBC  3.14*   HCT  29.6*   HGB  9.3*   PLT  195   NA  145   K  3.6   BUN  13   CREA  1.27   GFRAA  >60   GFRNA  56*   CA  8.3*       BMI: Body mass index is 36.44 kg/(m^2).     ALLERGIES:   Allergies   Allergen Reactions    Doxycycline Anaphylaxis    Bactrim [Sulfamethoprim Ds] Diarrhea    Keflex [Cephalexin] Diarrhea       Lines:     PICC Double Lumen 19/91/22 Right;Basilic (Active)   Central Line Being Utilized Yes 4/5/2018  4:00 AM   Criteria for Appropriate Use Long term IV/antibiotic administration 4/5/2018  4:00 AM   Site Assessment Clean, dry, & intact 4/5/2018  4:00 AM   Phlebitis Assessment 0 4/5/2018  4:00 AM   Infiltration Assessment 0 4/5/2018  4:00 AM   Date of Last Dressing Change 03/30/18 4/5/2018  4:00 AM   Dressing Status Clean, dry, & intact 4/5/2018  4:00 AM   Action Taken Open ports on tubing capped 4/5/2018  4:00 AM   Dressing Type Disk with Chlorhexadine gluconate (CHG) 4/5/2018  4:00 AM   Hub Color/Line Status Blue 4/5/2018  4:00 AM   Positive Blood Return (Site #1) Yes 4/5/2018  4:00 AM   Hub Color/Line Status Red 4/5/2018  4:00 AM   Positive Blood Return (Site #2) Yes 4/5/2018  4:00 AM   Alcohol Cap Used Yes 4/5/2018  4:00 AM             History:    Past Medical History:   Diagnosis Date    Asthma     CAD (coronary artery disease)     Diabetes ([address]     Diabetic foot ulcer ([address]     Diabetic neuropathy ([address]     HTN (hypertension)      Past Surgical History:   Procedure Laterality Date   [place]    bypass    HX ORTHOPAEDIC       Patient Active Problem List   Diagnosis Code    Diabetic foot infection ([facility] E11.628, L08.9    HTN (hypertension) I10    Diabetic neuropathy (HCC) E11.40    Diabetic foot ulcer ([facility] E11.621, L97.509    Diabetes ([facility] E11.9    CAD (coronary artery disease) I25.10    Cellulitis L03.90    Osteomyelitis of right foot ([facility] M86.9

## 2018-04-05 NOTE — PROGRESS NOTES
1900: Received report from [facility]. Pt is alert and oriented x4. White board updated. No reports of pain. Pt does not have any questions or concerns at this time. Will continue to monitor. 2100: Dr. Eileen Protillo called, cardiac cath procedure rescheduled for 0700 instead of 0600.    2355: Reassessment completed, no changes compared to initial assessment, pt sleeping. Bed in lowest position, and call bell within reach. Will continue to monitor. 0000: PRN Robitussin given for cough. 0400: Reassessment completed, no changes. Pt does not report any pain . Bed in lowest position, and call bell within reach. 0530: Patient prepped for cardiac cath procedure, CHF wipes used, linen changed, consent form signed and in chart.

## 2018-04-05 NOTE — PROGRESS NOTES
TRANSFER - OUT REPORT:  Verbal report given to JUDY Mena(name) on Liana Fairchild being transferred to care(unit) for routine post - op   Report consisted of patients Situation, Background, Assessment and   Recommendations(SBAR). Information from the following report(s) SBAR, Kardex, Procedure Summary, MAR, Recent Results and Cardiac Rhythm NSR 1avb 76bpm was reviewed with the receiving nurse. Cath Lab Report:    Procedure:  [x ] LHC  [ ] RHC  [ ] PTCA   [ ] Peripheral   [ ] Pacemaker [ ] ELLY  [ ] [facility]    Access site:   [ ] Right [x ] Left  [ x] Radial [ ] Brachial [ ] Femoral [ ] Jugular [ ] Chest Wall      Sheath:           x[ ] Pulled in Cath Lab   [ ] In place   [ ] To be pulled after:         Closure:          [ x] Radial Band  [ ] Manual Pressure     [ ] Enma Matters     [ ] Star Close    [ ] Per Close    [ ] Safe Guard    Site Assessment:   [x ] Clean, Dry, No bleeding    [ ] Minor oozing          [ ] Hematoma: Description:    Stents(s) Placement:  [ ] Left Main:                 [ ] LAD:                [ ] Circ:                [ ] RCA:                [ ] EF:     [ ] Peripheral:      [x ] N/A    Infusion [ ]Angiomax [ ] Integrelin [ ] Heparin d/c'd:      Intra procedure Medications:    Fentanyl:150mcg IV  Versed:2mg IV  Heparin:4000Units @0734  Antiplatelet:  Other:    Lines:                Patient Vitals for the past 4 hrs:   Temp Pulse Resp BP SpO2   04/05/18 0758 98 °F (36.7 °C) 80 21 162/79 97 %         Extended / Orthostatic Vitals:    Vital Signs  Level of Consciousness: Alert (04/05/18 0758)  Temp: 98 °F (36.7 °C) (04/05/18 0758)  Temp Source: Oral (04/05/18 0758)  Pulse (Heart Rate): 80 (04/05/18 0758)  Heart Rate Source: Monitor (04/05/18 0758)  Cardiac Rhythm: Normal sinus rhythm;AVB- 1st degree (04/05/18 0758)  Resp Rate: 21 (04/05/18 0758)  BP: 162/79 (04/05/18 0758)  MAP (Monitor): 84 (04/03/18 1600)  MAP (Calculated): 107 (04/05/18 0758)  BP 1 Location: Right arm (04/05/18 0758)  BP 1 Method: Automatic (04/05/18 0758)  BP Patient Position: At rest;Supine (04/05/18 0758)  MEWS Score: 2 (04/05/18 0758)         Oxygen Therapy  O2 Sat (%): 97 % (04/05/18 0758)  Pulse via Oximetry: 76 beats per minute (04/03/18 1600)  O2 Device: Room air (04/04/18 1900)  O2 Flow Rate (L/min): 2 l/min (03/30/18 2000)          Opportunity for questions and clarification was provided.

## 2018-04-05 NOTE — H&P
Date of Surgery Update:  Keira Patricia was seen and examined. History and physical has been reviewed. The patient has been examined.  There have been no significant clinical changes since the completion of the originally dated History and Physical.    Signed By: Francie Summers MD     April 5, 2018 7:29 AM

## 2018-04-05 NOTE — ROUTINE PROCESS
TRANSFER - OUT REPORT:    Verbal report given to Kristi KIM(name) on Kain Bourne  being transferred to [address] [place] [address] (unit) for routine progression of care       Report consisted of patients Situation, Background, Assessment and   Recommendations(SBAR). Information from the following report(s) SBAR, Kardex, Intake/Output, MAR, Med Rec Status and Cardiac Rhythm SR was reviewed with the receiving nurse. Lines:   PICC Double Lumen 86/90/69 Right;Basilic (Active)   Central Line Being Utilized Yes 4/5/2018  4:00 AM   Criteria for Appropriate Use Long term IV/antibiotic administration 4/5/2018  4:00 AM   Site Assessment Clean, dry, & intact 4/5/2018  4:00 AM   Phlebitis Assessment 0 4/5/2018  4:00 AM   Infiltration Assessment 0 4/5/2018  4:00 AM   Date of Last Dressing Change 03/30/18 4/5/2018  4:00 AM   Dressing Status Clean, dry, & intact 4/5/2018  4:00 AM   Action Taken Open ports on tubing capped 4/5/2018  4:00 AM   Dressing Type Disk with Chlorhexadine gluconate (CHG) 4/5/2018  4:00 AM   Hub Color/Line Status Blue 4/5/2018  4:00 AM   Positive Blood Return (Site #1) Yes 4/5/2018  4:00 AM   Hub Color/Line Status Red 4/5/2018  4:00 AM   Positive Blood Return (Site #2) Yes 4/5/2018  4:00 AM   Alcohol Cap Used Yes 4/5/2018  4:00 AM        Opportunity for questions and clarification was provided.       Patient transported with:   Monitor

## 2018-04-05 NOTE — ROUTINE PROCESS
-0800 Assumed pt care. Received bedside and verbal SBAR from [id number] [facility]. Pt taken to cath lab around [address].  -0911 Post procedure call from Vinicius Page. L wrist puncture, dressing applied during procedure, dressing C,D, and I. VS stable. Pt alert.   -Pt to be discharged to [facility]. [facility] to transport. Face sheet, SNF, Scripts, and discharge summery provided.

## 2018-04-05 NOTE — PROGRESS NOTES
D/c Rehab anticipate to Rehab  Met with patient at bedside. Patient states he is agreeing to take the cam boot that was ordered for him   Need clarification about if he will need IV antibiotics or will that change to po when he goes to Rehab  Care Management Interventions  PCP Verified by CM: Yes  Mode of Transport at Discharge:  Other (see comment) (family)  Transition of Care Consult (CM Consult): SNF  Partner SNF: Yes  Health Maintenance Reviewed: Yes  Physical Therapy Consult: Yes  Occupational Therapy Consult: Yes  Current Support Network: Lives with Spouse  Confirm Follow Up Transport: Family  Plan discussed with Pt/Family/Caregiver: Yes  Freedom of Choice Offered: Yes  Discharge Location  Discharge Placement: Skilled nursing facility

## 2018-04-05 NOTE — ROUTINE PROCESS
0700: Bedside and Verbal shift change report given to [employer] (oncoming nurse) by Waldemar Harper RN (offgoing nurse). Report included the following information SBAR, Kardex, Procedure Summary, Intake/Output, MAR and Recent Results.

## 2018-04-05 NOTE — PROGRESS NOTES
D/c rehab today  Met with patient he agrees to take cam boot that was provided. Patient states he is willing to go to [facility] today. He did have questions and Adolfo Barton answered. Patient encouraged to eat his lunch. Patient states he has no way to get to [facility]. Transportation has been arranged for patient for 1500 by [employer] Telephone call with desiree at [facility] she can accommodate patient today and will need d/c summary and wound care orders and when to remove the wrist immobilizer post cath. RN please call report to CORINE Neal at [address]. [phone] for report. Please include all hard scripts for controlled substances, med rec and dc summary in packet. Please medicate for pain prior to dc if possible and needed to help offset delay when patient first arrives to facility. Care Management Interventions  PCP Verified by CM:  Yes  Mode of Transport at Discharge: BLS  Transition of Care Consult (CM Consult): SNF  Partner SNF: Yes  Health Maintenance Reviewed: Yes  Physical Therapy Consult: Yes  Occupational Therapy Consult: Yes  Current Support Network: Lives with Spouse  Confirm Follow Up Transport: Other (see comment)  Plan discussed with Pt/Family/Caregiver: Yes  Freedom of Choice Offered: Yes  Discharge Location  Discharge Placement: Skilled nursing facility

## 2018-04-05 NOTE — PROGRESS NOTES
Problem: Falls - Risk of  Goal: *Absence of Falls  Document Lise Fall Risk and appropriate interventions in the flowsheet.    Outcome: Progressing Towards Goal  Fall Risk Interventions:  Mobility Interventions: Communicate number of staff needed for ambulation/transfer, Patient to call before getting OOB, Strengthening exercises (ROM-active/passive)         Medication Interventions: Patient to call before getting OOB, Teach patient to arise slowly    Elimination Interventions: Call light in reach, Patient to call for help with toileting needs, Toilet paper/wipes in reach, Toileting schedule/hourly rounds, Urinal in reach    History of Falls Interventions: Door open when patient unattended, Room close to nurse's station, Investigate reason for fall

## 2018-04-09 ENCOUNTER — HOSPITAL ENCOUNTER (OUTPATIENT)
Dept: LAB | Age: 73
Discharge: HOME | End: 2018-04-09

## 2018-04-09 LAB
ALBUMIN SERPL-MCNC: 2.8 G/DL (ref 3.4–5)
ALBUMIN/GLOB SERPL: 0.8 {RATIO} (ref 0.8–1.7)
ALP SERPL-CCNC: 98 U/L (ref 45–117)
ALT SERPL-CCNC: 32 U/L (ref 16–61)
ANION GAP SERPL CALC-SCNC: 7 MMOL/L (ref 3–18)
AST SERPL-CCNC: 20 U/L (ref 15–37)
BASOPHILS # BLD: 0.1 K/UL (ref 0–0.06)
BASOPHILS NFR BLD: 1 % (ref 0–2)
BILIRUB SERPL-MCNC: 0.6 MG/DL (ref 0.2–1)
BUN SERPL-MCNC: 14 MG/DL (ref 7–18)
BUN/CREAT SERPL: 12 (ref 12–20)
CALCIUM SERPL-MCNC: 8.4 MG/DL (ref 8.5–10.1)
CHLORIDE SERPL-SCNC: 111 MMOL/L (ref 100–108)
CO2 SERPL-SCNC: 30 MMOL/L (ref 21–32)
CREAT SERPL-MCNC: 1.19 MG/DL (ref 0.6–1.3)
CRP SERPL-MCNC: 0.9 MG/DL (ref 0–0.3)
DIFFERENTIAL METHOD BLD: ABNORMAL
EOSINOPHIL # BLD: 0.3 K/UL (ref 0–0.4)
EOSINOPHIL NFR BLD: 5 % (ref 0–5)
ERYTHROCYTE [DISTWIDTH] IN BLOOD BY AUTOMATED COUNT: 14.1 % (ref 11.6–14.5)
ERYTHROCYTE [SEDIMENTATION RATE] IN BLOOD: 59 MM/HR (ref 0–20)
GLOBULIN SER CALC-MCNC: 3.3 G/DL (ref 2–4)
GLUCOSE SERPL-MCNC: 129 MG/DL (ref 74–99)
HCT VFR BLD AUTO: 32.8 % (ref 36–48)
HGB BLD-MCNC: 10.2 G/DL (ref 13–16)
LYMPHOCYTES # BLD: 1 K/UL (ref 0.9–3.6)
LYMPHOCYTES NFR BLD: 15 % (ref 21–52)
MCH RBC QN AUTO: 29.7 PG (ref 24–34)
MCHC RBC AUTO-ENTMCNC: 31.1 G/DL (ref 31–37)
MCV RBC AUTO: 95.6 FL (ref 74–97)
MONOCYTES # BLD: 0.6 K/UL (ref 0.05–1.2)
MONOCYTES NFR BLD: 9 % (ref 3–10)
NEUTS SEG # BLD: 4.7 K/UL (ref 1.8–8)
NEUTS SEG NFR BLD: 70 % (ref 40–73)
PLATELET # BLD AUTO: 212 K/UL (ref 135–420)
PMV BLD AUTO: 9.9 FL (ref 9.2–11.8)
POTASSIUM SERPL-SCNC: 3.9 MMOL/L (ref 3.5–5.5)
PROT SERPL-MCNC: 6.1 G/DL (ref 6.4–8.2)
RBC # BLD AUTO: 3.43 M/UL (ref 4.7–5.5)
SODIUM SERPL-SCNC: 148 MMOL/L (ref 136–145)
WBC # BLD AUTO: 6.6 K/UL (ref 4.6–13.2)

## 2018-04-09 PROCEDURE — 86140 C-REACTIVE PROTEIN: CPT | Performed by: FAMILY MEDICINE

## 2018-04-09 PROCEDURE — 85025 COMPLETE CBC W/AUTO DIFF WBC: CPT | Performed by: FAMILY MEDICINE

## 2018-04-09 PROCEDURE — 80053 COMPREHEN METABOLIC PANEL: CPT | Performed by: FAMILY MEDICINE

## 2018-04-09 PROCEDURE — 85652 RBC SED RATE AUTOMATED: CPT | Performed by: FAMILY MEDICINE

## 2018-04-12 ENCOUNTER — HOSPITAL ENCOUNTER (OUTPATIENT)
Dept: LAB | Age: 73
Discharge: HOME | End: 2018-04-12

## 2018-04-12 LAB
DATE LAST DOSE: ABNORMAL
REPORTED DOSE,DOSE: ABNORMAL UNITS
REPORTED DOSE/TIME,TMG: 2100
VANCOMYCIN TROUGH SERPL-MCNC: 24.6 UG/ML (ref 10–20)

## 2018-04-12 PROCEDURE — 80202 ASSAY OF VANCOMYCIN: CPT | Performed by: FAMILY MEDICINE

## 2018-04-13 ENCOUNTER — APPOINTMENT (OUTPATIENT)
Dept: GENERAL RADIOLOGY | Age: 73
End: 2018-04-13
Attending: PHYSICIAN ASSISTANT
Payer: MEDICARE

## 2018-04-13 ENCOUNTER — HOSPITAL ENCOUNTER (EMERGENCY)
Age: 73
Discharge: HOME | End: 2018-04-14
Attending: INTERNAL MEDICINE | Admitting: INTERNAL MEDICINE
Payer: MEDICARE

## 2018-04-13 DIAGNOSIS — R60.9 PERIPHERAL EDEMA: ICD-10-CM

## 2018-04-13 DIAGNOSIS — I50.9 ACUTE ON CHRONIC CONGESTIVE HEART FAILURE, UNSPECIFIED HEART FAILURE TYPE (HCC): Primary | ICD-10-CM

## 2018-04-13 DIAGNOSIS — E11.621 DIABETIC ULCER OF MIDFOOT ASSOCIATED WITH TYPE 2 DIABETES MELLITUS, UNSPECIFIED LATERALITY, UNSPECIFIED ULCER STAGE (HCC): ICD-10-CM

## 2018-04-13 DIAGNOSIS — L97.409 DIABETIC ULCER OF MIDFOOT ASSOCIATED WITH TYPE 2 DIABETES MELLITUS, UNSPECIFIED LATERALITY, UNSPECIFIED ULCER STAGE (HCC): ICD-10-CM

## 2018-04-13 LAB
ANION GAP SERPL CALC-SCNC: 4 MMOL/L (ref 3–18)
BASOPHILS # BLD: 0 K/UL (ref 0–0.06)
BASOPHILS NFR BLD: 1 % (ref 0–2)
BNP SERPL-MCNC: 1435 PG/ML (ref 0–900)
BUN SERPL-MCNC: 18 MG/DL (ref 7–18)
BUN/CREAT SERPL: 15 (ref 12–20)
CALCIUM SERPL-MCNC: 8.7 MG/DL (ref 8.5–10.1)
CHLORIDE SERPL-SCNC: 110 MMOL/L (ref 100–108)
CK MB CFR SERPL CALC: ABNORMAL % (ref 0–4)
CK MB SERPL-MCNC: <1 NG/ML (ref 5–25)
CK SERPL-CCNC: 33 U/L (ref 39–308)
CO2 SERPL-SCNC: 32 MMOL/L (ref 21–32)
CREAT SERPL-MCNC: 1.2 MG/DL (ref 0.6–1.3)
DIFFERENTIAL METHOD BLD: ABNORMAL
EOSINOPHIL # BLD: 0.5 K/UL (ref 0–0.4)
EOSINOPHIL NFR BLD: 7 % (ref 0–5)
ERYTHROCYTE [DISTWIDTH] IN BLOOD BY AUTOMATED COUNT: 14.4 % (ref 11.6–14.5)
GLUCOSE SERPL-MCNC: 122 MG/DL (ref 74–99)
HCT VFR BLD AUTO: 31 % (ref 36–48)
HGB BLD-MCNC: 9.9 G/DL (ref 13–16)
INR PPP: 1.2 (ref 0.8–1.2)
LYMPHOCYTES # BLD: 1.2 K/UL (ref 0.9–3.6)
LYMPHOCYTES NFR BLD: 19 % (ref 21–52)
MCH RBC QN AUTO: 29.9 PG (ref 24–34)
MCHC RBC AUTO-ENTMCNC: 31.9 G/DL (ref 31–37)
MCV RBC AUTO: 93.7 FL (ref 74–97)
MONOCYTES # BLD: 0.5 K/UL (ref 0.05–1.2)
MONOCYTES NFR BLD: 8 % (ref 3–10)
NEUTS SEG # BLD: 4 K/UL (ref 1.8–8)
NEUTS SEG NFR BLD: 65 % (ref 40–73)
PLATELET # BLD AUTO: 171 K/UL (ref 135–420)
PMV BLD AUTO: 9.3 FL (ref 9.2–11.8)
POTASSIUM SERPL-SCNC: 3.5 MMOL/L (ref 3.5–5.5)
PROTHROMBIN TIME: 14.7 SEC (ref 11.5–15.2)
RBC # BLD AUTO: 3.31 M/UL (ref 4.7–5.5)
SODIUM SERPL-SCNC: 146 MMOL/L (ref 136–145)
TROPONIN I SERPL-MCNC: <0.02 NG/ML (ref 0–0.06)
WBC # BLD AUTO: 6.1 K/UL (ref 4.6–13.2)

## 2018-04-13 PROCEDURE — 83880 ASSAY OF NATRIURETIC PEPTIDE: CPT | Performed by: PHYSICIAN ASSISTANT

## 2018-04-13 PROCEDURE — 99285 EMERGENCY DEPT VISIT HI MDM: CPT

## 2018-04-13 PROCEDURE — 85025 COMPLETE CBC W/AUTO DIFF WBC: CPT | Performed by: PHYSICIAN ASSISTANT

## 2018-04-13 PROCEDURE — 74011250636 HC RX REV CODE- 250/636: Performed by: PHYSICIAN ASSISTANT

## 2018-04-13 PROCEDURE — 93970 EXTREMITY STUDY: CPT

## 2018-04-13 PROCEDURE — 85610 PROTHROMBIN TIME: CPT | Performed by: PHYSICIAN ASSISTANT

## 2018-04-13 PROCEDURE — 82550 ASSAY OF CK (CPK): CPT | Performed by: PHYSICIAN ASSISTANT

## 2018-04-13 PROCEDURE — 93005 ELECTROCARDIOGRAM TRACING: CPT

## 2018-04-13 PROCEDURE — 71045 X-RAY EXAM CHEST 1 VIEW: CPT

## 2018-04-13 PROCEDURE — 80048 BASIC METABOLIC PNL TOTAL CA: CPT | Performed by: PHYSICIAN ASSISTANT

## 2018-04-13 PROCEDURE — 96374 THER/PROPH/DIAG INJ IV PUSH: CPT

## 2018-04-13 RX ORDER — FUROSEMIDE 10 MG/ML
60 INJECTION INTRAMUSCULAR; INTRAVENOUS
Status: DISCONTINUED | OUTPATIENT
Start: 2018-04-13 | End: 2018-04-13

## 2018-04-13 RX ORDER — FUROSEMIDE 40 MG/1
60 TABLET ORAL DAILY
Qty: 10 TAB | Refills: 0 | Status: SHIPPED | OUTPATIENT
Start: 2018-04-13 | End: 2021-07-07

## 2018-04-13 RX ORDER — POLYETHYLENE GLYCOL 3350 17 G/17G
17 POWDER, FOR SOLUTION ORAL 3 TIMES DAILY
COMMUNITY
End: 2021-07-07

## 2018-04-13 RX ORDER — ACETAMINOPHEN 500 MG
500 TABLET ORAL
COMMUNITY

## 2018-04-13 RX ORDER — ADHESIVE BANDAGE
30 BANDAGE TOPICAL DAILY
COMMUNITY
End: 2021-07-07

## 2018-04-13 RX ORDER — FACIAL-BODY WIPES
10 EACH TOPICAL DAILY
COMMUNITY
End: 2021-07-07

## 2018-04-13 RX ORDER — MAG HYDROX/ALUMINUM HYD/SIMETH 200-200-20
30 SUSPENSION, ORAL (FINAL DOSE FORM) ORAL
COMMUNITY
End: 2021-07-07

## 2018-04-13 RX ORDER — GUAIFENESIN 100 MG/5ML
200 SOLUTION ORAL
COMMUNITY
End: 2021-07-07

## 2018-04-13 RX ORDER — FUROSEMIDE 10 MG/ML
40 INJECTION INTRAMUSCULAR; INTRAVENOUS
Status: COMPLETED | OUTPATIENT
Start: 2018-04-13 | End: 2018-04-13

## 2018-04-13 RX ORDER — IPRATROPIUM BROMIDE AND ALBUTEROL SULFATE 2.5; .5 MG/3ML; MG/3ML
3 SOLUTION RESPIRATORY (INHALATION)
COMMUNITY
End: 2021-07-07

## 2018-04-13 RX ORDER — AMOXICILLIN 250 MG
2 CAPSULE ORAL 2 TIMES DAILY
COMMUNITY
End: 2021-07-07

## 2018-04-13 RX ADMIN — FUROSEMIDE 40 MG: 10 INJECTION, SOLUTION INTRAMUSCULAR; INTRAVENOUS at 22:11

## 2018-04-13 NOTE — PROCEDURES
[facility]  *** FINAL REPORT ***    Name: Charissa Sylvester  MRN: [id number]    Outpatient  : 29 Oct 1945  HIS Order #: [id number]  [id number] [facility] Visit #: [id number]  Date: 2018    TYPE OF TEST: Peripheral Venous Testing    REASON FOR TEST  Pain in limb, Limb swelling    Right Leg:-  Deep venous thrombosis:           No  Superficial venous thrombosis:    No  Deep venous insufficiency:        Not examined  Superficial venous insufficiency: Not examined    Left Leg:-  Deep venous thrombosis:           No  Superficial venous thrombosis:    No  Deep venous insufficiency:        Not examined  Superficial venous insufficiency: Not examined      INTERPRETATION/FINDINGS  Duplex images were obtained using 2-D gray scale, color flow, and  spectral Doppler analysis. Right leg :  1. Deep vein(s) visualized include the common femoral, deep femoral,  proximal femoral, mid femoral, distal femoral, popliteal(above knee),  popliteal(fossa), popliteal(below knee) and posterior tibial veins. 2. No evidence of deep venous thrombosis detected in the veins  visualized. 3. Superficial vein(s) visualized include the great saphenous vein. 4. No evidence of superficial thrombosis detected. Left leg :  1. Deep vein(s) visualized include the common femoral, deep femoral,  proximal femoral, mid femoral, distal femoral, popliteal(above knee),  popliteal(fossa), popliteal(below knee) and posterior tibial veins. 2. No evidence of deep venous thrombosis detected in the veins  visualized. 3. Superficial vein(s) visualized include the great saphenous vein. 4. No evidence of superficial thrombosis detected. ADDITIONAL COMMENTS    I have personally reviewed the data relevant to the interpretation of  this  study. TECHNOLOGIST: Diego Cardenas  Signed: 2018 06:58 PM    PHYSICIAN: Mark Kraft.  Lon Estrella MD  Signed: 04/15/2018 07:59 AM

## 2018-04-13 NOTE — ED TRIAGE NOTES
Pt presents to ED via non-emergent ambulance from [address] [place] [address] for leg swelling. Pt states he has been receiving Lasix but it is not helping.

## 2018-04-13 NOTE — ED PROVIDER NOTES
EMERGENCY DEPARTMENT HISTORY AND PHYSICAL EXAM    Date: 4/13/2018  Patient Name: Angel Holguin    History of Presenting Illness     Chief Complaint   Patient presents with    Leg Swelling         History Provided By: Patient    Chief Complaint: bilateral lower leg swelling  Duration: a couple days  Timing:  Improving  Location: bilateral lower legs  Modifying Factors: Lasix without relief  Associated Symptoms: chest pain    Additional History (Context):   4:35 PM   Angel Holguin is a 71 y.o. male with PMHX of DM, diabetic foot ulcer, CHF who presents to the emergency department C/O improved bilateral ower leg swelling and erythema starting \"a couple days\". Pt has been taking Lasix without relief. Associated sxs include constant left sided chest pain starting 3 days ago. Pt was admitted to this facility 3 weeks ago for diabetic foot ulcer and cellulitis to right foot. He was started on broad spectrum IV antibiotics and ultimately went to the OR for an I&D and resection of infected bone of right foot. Pt also has a chronic wound to the left foot. Pt is on Zosyn and Vancomycin, but refused doses for yesterday and today. Followed by Podiatry, Dr. Fatmata Guaman. Pt is currently taking Tylenol and ASA for pain. Reports hx of blood clot. Pt denies fever, and any other sxs or complaints. PCP: Lavon Fernandes MD    Current Outpatient Prescriptions   Medication Sig Dispense Refill    furosemide (LASIX) 40 mg tablet Take 1.5 Tabs by mouth daily. 10 Tab 0    guaiFENesin (ROBAFEN) 100 mg/5 mL liquid Take 200 mg by mouth four (4) times daily as needed for Cough.  acetaminophen (TYLENOL EXTRA STRENGTH) 500 mg tablet Take 500 mg by mouth every six (6) hours as needed for Pain.  senna-docusate (SENNA LAXATIVE-STOOL SOFTENER) 8.6-50 mg per tablet Take 2 Tabs by mouth two (2) times a day.  polyethylene glycol (MIRALAX) 17 gram packet Take 17 g by mouth three (3) times daily.       albuterol-ipratropium (DUO-NEB) 2.5 mg-0.5 mg/3 ml nebu 3 mL by Nebulization route every four (4) hours as needed.  alum-mag hydroxide-simeth (MYLANTA) 200-200-20 mg/5 mL susp Take 30 mL by mouth every four (4) hours as needed.  bisacodyl (DULCOLAX, BISACODYL,) 10 mg suppository Insert 10 mg into rectum daily.  magnesium hydroxide (CANELA MILK OF MAGNESIA) 400 mg/5 mL suspension Take 30 mL by mouth daily.  vancomycin in 0.9% Sodium Cl (VANCOCIN) 1.75 gram/500 mL soln 500 mL by IntraVENous route every twelve (12) hours every twelve (12) hours for 29 days. (Patient taking differently: 1,500 mg by IntraVENous route every twelve (12) hours every twelve (12) hours.) 500 mL 0    atorvastatin (LIPITOR) 40 mg tablet Take 1 Tab by mouth nightly. 30 Tab 0    metoprolol tartrate (LOPRESSOR) 25 mg tablet Take 1 Tab by mouth every twelve (12) hours. 60 Tab 0    piperacillin-tazobactam 3.375 gram 3.375 g IVPB 3.375 g by IntraVENous route every six (6) hours for 29 days. 1 Dose 0    isosorbide mononitrate ER (IMDUR) 60 mg CR tablet Take 1 Tab by mouth daily. 30 Tab 0    insulin lispro (HUMALOG) 100 unit/mL injection Normal Insulin Sensitivity   For Blood Sugar (mg/dL) of:     Less than 150 =   0 units           150 -199 =   2 units  200 -249 =   4 units  250 -299 =   6 units  300 -349 =   8 units  350 and above =   10 units  If 2 glucose readings are above 200 mg/dL within a 24 hr period, proceed to \"Very Insul 1 Vial 0    aspirin 81 mg chewable tablet Take 81 mg by mouth daily.  insulin lispro protamine/insulin lispro (HUMALOG MIX 75-25) 100 unit/mL (75-25) injection 50 Units by SubCUTAneous route two (2) times a day.          Past History     Past Medical History:  Past Medical History:   Diagnosis Date    Asthma     CAD (coronary artery disease)     Diabetes ([address]     Diabetic foot ulcer ([address]     Diabetic neuropathy [facility]     HTN (hypertension)        Past Surgical History:  Past Surgical History:   Procedure Laterality Date    CARDIAC SURG PROCEDURE UNLIST  1998    bypass    HX ORTHOPAEDIC         Family History:  History reviewed. No pertinent family history. Social History:  Social History   Substance Use Topics    Smoking status: Never Smoker    Smokeless tobacco: Never Used    Alcohol use No       Allergies: Allergies   Allergen Reactions    Doxycycline Anaphylaxis    Bactrim [Sulfamethoprim Ds] Diarrhea    Keflex [Cephalexin] Diarrhea         Review of Systems   Review of Systems   Constitutional: Negative for fever. Cardiovascular: Positive for chest pain (left sided) and leg swelling (bilateral). Skin: Positive for color change (erythema to bilateral lower legs) and wound (to bilateral feet). All other systems reviewed and are negative. Physical Exam     Vitals:    04/13/18 1800 04/13/18 2211 04/13/18 2230 04/13/18 2245   BP:  162/66 162/60 148/66   Pulse: 69 78 81 81   Resp: 16  (!) 33 23   Temp:       SpO2: 96%      Weight:       Height:         Physical Exam   Nursing note and vitals reviewed. Vital signs and nursing notes reviewed. CONSTITUTIONAL: Alert. Chronically ill appearing male. Overweight. HEAD: Normocephalic; atraumatic. EYES: PERRL; EOM's intact. No nystagmus. Conjunctiva clear. ENT: TM's normal. External ear normal. Normal nose; no rhinorrhea. Normal pharynx. Tonsils not enlarged without exudate. Moist mucus membranes. NECK: Supple; FROM without difficulty, non-tender; no cervical lymphadenopathy. CV: Normal S1, S2; no murmurs, rubs, or gallops. No chest wall tenderness. RESPIRATORY: Normal chest excursion with respiration; breath sounds clear and equal bilaterally; no wheezes, rhonchi, or rales. GI: Normal bowel sounds; non-distended; non-tender; no guarding or rigidity; no palpable organomegaly. No CVA tenderness. BACK:  No evidence of trauma or deformity. Non-tender to palpation. FROM without difficulty. Negative straight leg raise bilaterally.   EXT: 2+ pitting edema to bilateral lower extremities. Dressings in place for both feet. There is an approximately 1.5 cm ulcer to the plantar aspect of his left 1st MTP; no bleeding. Faint area of erythema to his left anteromedial shin. He has 2+ DP pulses. Right foot: surgical wound with sutures in place to plantar aspect of 1st MTP. SKIN: Normal for age and race; warm; dry; good turgor; no apparent lesions or exudate. NEURO: A & O x3. Cranial nerves 2-12 intact. Motor 5/5 bilaterally. Sensation intact. PSYCH:  Mood and affect appropriate. Diagnostic Study Results     Labs -     Recent Results (from the past 12 hour(s))   EKG, 12 LEAD, INITIAL    Collection Time: 04/13/18  4:26 PM   Result Value Ref Range    Ventricular Rate 76 BPM    Atrial Rate 76 BPM    P-R Interval 202 ms    QRS Duration 90 ms    Q-T Interval 380 ms    QTC Calculation (Bezet) 427 ms    Calculated R Axis 17 degrees    Calculated T Axis -6 degrees    Diagnosis       Sinus rhythm with occasional premature ventricular complexes  Cannot rule out Inferior infarct (cited on or before 01-APR-2018)  Abnormal ECG  When compared with ECG of 01-APR-2018 23:13,  premature ventricular complexes are now present  IL interval has decreased     CBC WITH AUTOMATED DIFF    Collection Time: 04/13/18  5:10 PM   Result Value Ref Range    WBC 6.1 4.6 - 13.2 K/uL    RBC 3.31 (L) 4.70 - 5.50 M/uL    HGB 9.9 (L) 13.0 - 16.0 g/dL    HCT 31.0 (L) 36.0 - 48.0 %    MCV 93.7 74.0 - 97.0 FL    MCH 29.9 24.0 - 34.0 PG    MCHC 31.9 31.0 - 37.0 g/dL    RDW 14.4 11.6 - 14.5 %    PLATELET 930 972 - 557 K/uL    MPV 9.3 9.2 - 11.8 FL    NEUTROPHILS 65 40 - 73 %    LYMPHOCYTES 19 (L) 21 - 52 %    MONOCYTES 8 3 - 10 %    EOSINOPHILS 7 (H) 0 - 5 %    BASOPHILS 1 0 - 2 %    ABS. NEUTROPHILS 4.0 1.8 - 8.0 K/UL    ABS. LYMPHOCYTES 1.2 0.9 - 3.6 K/UL    ABS. MONOCYTES 0.5 0.05 - 1.2 K/UL    ABS. EOSINOPHILS 0.5 (H) 0.0 - 0.4 K/UL    ABS.  BASOPHILS 0.0 0.0 - 0.06 K/UL    DF AUTOMATED METABOLIC PANEL, BASIC    Collection Time: 04/13/18  5:10 PM   Result Value Ref Range    Sodium 146 (H) 136 - 145 mmol/L    Potassium 3.5 3.5 - 5.5 mmol/L    Chloride 110 (H) 100 - 108 mmol/L    CO2 32 21 - 32 mmol/L    Anion gap 4 3.0 - 18 mmol/L    Glucose 122 (H) 74 - 99 mg/dL    BUN 18 7.0 - 18 MG/DL    Creatinine 1.20 0.6 - 1.3 MG/DL    BUN/Creatinine ratio 15 12 - 20      GFR est AA >60 >60 ml/min/1.73m2    GFR est non-AA 60 (L) >60 ml/min/1.73m2    Calcium 8.7 8.5 - 10.1 MG/DL   PROTHROMBIN TIME + INR    Collection Time: 04/13/18  5:10 PM   Result Value Ref Range    Prothrombin time 14.7 11.5 - 15.2 sec    INR 1.2 0.8 - 1.2     NT-PRO BNP    Collection Time: 04/13/18  5:10 PM   Result Value Ref Range    NT pro-BNP 1435 (H) 0 - 900 PG/ML   CARDIAC PANEL,(CK, CKMB & TROPONIN)    Collection Time: 04/13/18  5:10 PM   Result Value Ref Range    CK 33 (L) 39 - 308 U/L    CK - MB <1.0 <3.6 ng/ml    CK-MB Index  0.0 - 4.0 %     CALCULATION NOT PERFORMED WHEN RESULT IS BELOW LINEAR LIMIT    Troponin-I, Qt. <0.02 0.00 - 0.06 NG/ML       Radiologic Studies -     7:30 PM  RADIOLOGY FINDINGS  Chest X-ray shows cardiomegaly, no acute process  Pending review by Radiologist  Recorded by Velma Fields ED Scribe, as dictated by Cheng Jaimes PA-C     DUPLEX LOWER EXT VENOUS BILAT   INTERPRETATION/FINDINGS  Duplex images were obtained using 2-D gray scale, color flow, and  spectral Doppler analysis. Right leg :  1. Deep vein(s) visualized include the common femoral, deep femoral,  proximal femoral, mid femoral, distal femoral, popliteal(above knee),  popliteal(fossa), popliteal(below knee) and posterior tibial veins. 2. No evidence of deep venous thrombosis detected in the veins  visualized. 3. Superficial vein(s) visualized include the great saphenous vein. 4. No evidence of superficial thrombosis detected. Left leg :  1.  Deep vein(s) visualized include the common femoral, deep femoral,  proximal femoral, mid femoral, distal femoral, popliteal(above knee),  popliteal(fossa), popliteal(below knee) and posterior tibial veins. 2. No evidence of deep venous thrombosis detected in the veins  visualized. 3. Superficial vein(s) visualized include the great saphenous vein. 4. No evidence of superficial thrombosis detected. As read by the technologist   XR CHEST PORT    (Results Pending)     CT Results  (Last 48 hours)    None        CXR Results  (Last 48 hours)    None          Medications given in the ED-  Medications   furosemide (LASIX) injection 40 mg (40 mg IntraVENous Given 4/13/18 2211)         Medical Decision Making   I am the first provider for this patient. I reviewed the vital signs, available nursing notes, past medical history, past surgical history, family history and social history. Vital Signs-Reviewed the patient's vital signs. Pulse Oximetry Analysis - 97% on room air     Cardiac Monitor:  Rate: 75 bpm  Rhythm: NSR    EKG interpretation: (Preliminary)  4:26 PM   Sinus rhythm with occasional PVC. Rate 76 bpm. No STEMI  EKG read by Gabo Bustillo MD at 4:27 PM     Records Reviewed: Nursing Notes and Old Medical Records    Provider Notes (Medical Decision Making): Impression:  ACute on chronic CHF. Patient diuresing well after lasix, no hypoxia, hypotension. Discussed with Dr. Rafal Roberts, ER attending as well as hospitalist.  We all agree patient can be discharged home. Dr. Rafal Roberts came and saw patient as well. Tresa Amador      Procedures:  Procedures    ED Course:   4:35 PM Initial assessment performed. The patients presenting problems have been discussed, and they are in agreement with the care plan formulated and outlined with them. I have encouraged them to ask questions as they arise throughout their visit. 7:27 PM Discussed patient's history, exam, treatment course, and available diagnostics results with Gabo Bustillo MD, ED Attending.  Due to comorbidities and complaint of chest pressure with known CAD with medical management, symptoms likely related to CHF exacerbation, but recommends consulting hospitalist and giving 40 mg of IV Lasix. 7:37 PM Discussed patient's history, exam, and available diagnostics results with Monse Hurley, internal medicine, who recommends diuresis and monitoring the patient for response. If the patient improves clinically, is not hypoxic or tachypneic, he may be considered for discharge. If no improvement, call her back. SIGN OUT:  7:56 PM  Patient's presentation, labs/imaging and plan of care was reviewed with Luzmaria Gandhi PA-C as part of sign out. They will evaluate the patient after diuresis and decide on disposition as part of the plan discussed with the patient. Luzmaria Gandhi PA-C's assistance in completion of this plan is greatly appreciated but it should be noted that I will be the provider of record for this patient. Flores Castanon, ED Scribe for Sylvester Wellington PA-C       9:11 PM  I discussed care plan with Dr Jamison Linker he will evaluate pt based on his hx, PE, stable vital signs and he believed that if pt can be d/c back to nursing facility with a dose of IV Lasix. 10:45 PM Discussed patient's history, exam, and available diagnostics results with [employer], MD, ED attending, who agrees with plan. Recorded by DEMETRIO Olmosibe, as dictated by Shlomo Pruitt PA-C     FACE-TO-FACE PROGRESS NOTE:  10:46 PM   Was requested to see pt by the MIAN. Evaluated pt face-to-face. Asked by 2 MIAN to see patient who was in the ED for pedal edema. He has questionable CHF which appears mild. Pt appears comfortable with my exam. Pulse ox 98% on RA. He has pedal edema 2+. This appears chronic. Lungs are clear, advised PA to diurese pt and d/c back to nursing home, also disc with Dr. Monse Hurley who agrees with plan.   Written by DEMETRIO Syibe, as dictated by [employer], MD    Diagnosis and Disposition       DISCHARGE NOTE:  10:59 PM  Janene Crawford  results have been reviewed with him. He has been counseled regarding his diagnosis, treatment, and plan. He verbally conveys understanding and agreement of the signs, symptoms, diagnosis, treatment and prognosis and additionally agrees to follow up as discussed. He also agrees with the care-plan and conveys that all of his questions have been answered. I have also provided discharge instructions for him that include: educational information regarding their diagnosis and treatment, and list of reasons why they would want to return to the ED prior to their follow-up appointment, should his condition change. He has been provided with education for proper emergency department utilization. CLINICAL IMPRESSION:    1. Acute on chronic congestive heart failure, unspecified heart failure type ([facility] [facility]    2. Peripheral edema    3. Diabetic ulcer of midfoot associated with type 2 diabetes mellitus, unspecified laterality, unspecified ulcer stage ([facility]        PLAN:  1. D/C Home  2. Current Discharge Medication List      CONTINUE these medications which have CHANGED    Details   furosemide (LASIX) 40 mg tablet Take 1.5 Tabs by mouth daily. Qty: 10 Tab, Refills: 0         CONTINUE these medications which have NOT CHANGED    Details   guaiFENesin (ROBAFEN) 100 mg/5 mL liquid Take 200 mg by mouth four (4) times daily as needed for Cough. acetaminophen (TYLENOL EXTRA STRENGTH) 500 mg tablet Take 500 mg by mouth every six (6) hours as needed for Pain. senna-docusate (SENNA LAXATIVE-STOOL SOFTENER) 8.6-50 mg per tablet Take 2 Tabs by mouth two (2) times a day. polyethylene glycol (MIRALAX) 17 gram packet Take 17 g by mouth three (3) times daily. albuterol-ipratropium (DUO-NEB) 2.5 mg-0.5 mg/3 ml nebu 3 mL by Nebulization route every four (4) hours as needed.       alum-mag hydroxide-simeth (MYLANTA) 200-200-20 mg/5 mL susp Take 30 mL by mouth every four (4) hours as needed. bisacodyl (DULCOLAX, BISACODYL,) 10 mg suppository Insert 10 mg into rectum daily. magnesium hydroxide (CANELA MILK OF MAGNESIA) 400 mg/5 mL suspension Take 30 mL by mouth daily. vancomycin in 0.9% Sodium Cl (VANCOCIN) 1.75 gram/500 mL soln 500 mL by IntraVENous route every twelve (12) hours every twelve (12) hours for 29 days. Qty: 500 mL, Refills: 0      atorvastatin (LIPITOR) 40 mg tablet Take 1 Tab by mouth nightly. Qty: 30 Tab, Refills: 0      metoprolol tartrate (LOPRESSOR) 25 mg tablet Take 1 Tab by mouth every twelve (12) hours. Qty: 60 Tab, Refills: 0      piperacillin-tazobactam 3.375 gram 3.375 g IVPB 3.375 g by IntraVENous route every six (6) hours for 29 days. Qty: 1 Dose, Refills: 0      isosorbide mononitrate ER (IMDUR) 60 mg CR tablet Take 1 Tab by mouth daily. Qty: 30 Tab, Refills: 0      insulin lispro (HUMALOG) 100 unit/mL injection Normal Insulin Sensitivity   For Blood Sugar (mg/dL) of:     Less than 150 =   0 units           150 -199 =   2 units  200 -249 =   4 units  250 -299 =   6 units  300 -349 =   8 units  350 and above =   10 units  If 2 glucose readings are above 200 mg/dL within a 24 hr period, proceed to \"Very Insul  Qty: 1 Vial, Refills: 0      aspirin 81 mg chewable tablet Take 81 mg by mouth daily. insulin lispro protamine/insulin lispro (HUMALOG MIX 75-25) 100 unit/mL (75-25) injection 50 Units by SubCUTAneous route two (2) times a day.            3.   Follow-up Information     Follow up With Details Comments Contact Info    Joseph Camacho MD Schedule an appointment as soon as possible for a visit in 2 days  [address] Mota MD Schedule an appointment as soon as possible for a visit in 2 days  [address]  [address] [id number]  [phone]      [facility] EMERGENCY DEPT  As needed, If symptoms worsen [address] [place] [id number] [phone] _______________________________    Attestations: This note is prepared by Cinthya Cotto, Chandler Melara, and Xavi Rosario, acting as Scribe for [employer], PA-C    Supr[employer], [employer]:  The scribe's documentation has been prepared under my direction and personally reviewed by me in its entirety.   I confirm that the note above accurately reflects all work, treatment, procedures, and medical decision making performed by me.  _______________________________

## 2018-04-14 ENCOUNTER — HOSPITAL ENCOUNTER (OUTPATIENT)
Dept: LAB | Age: 73
Discharge: HOME | End: 2018-04-14

## 2018-04-14 VITALS
HEART RATE: 91 BPM | RESPIRATION RATE: 15 BRPM | OXYGEN SATURATION: 98 % | DIASTOLIC BLOOD PRESSURE: 90 MMHG | BODY MASS INDEX: 35.12 KG/M2 | HEIGHT: 73 IN | SYSTOLIC BLOOD PRESSURE: 137 MMHG | WEIGHT: 265 LBS | TEMPERATURE: 97.8 F

## 2018-04-14 LAB
DATE LAST DOSE: NORMAL
REPORTED DOSE,DOSE: 1.5 UNITS
REPORTED DOSE/TIME,TMG: 900
VANCOMYCIN TROUGH SERPL-MCNC: 14.8 UG/ML (ref 10–20)

## 2018-04-14 PROCEDURE — 80202 ASSAY OF VANCOMYCIN: CPT | Performed by: FAMILY MEDICINE

## 2018-04-14 NOTE — ED NOTES
[facility] in to return patient to [facility] Magallanes Spoke with Rosie Rojas with update given.

## 2018-04-14 NOTE — ED NOTES
Spoke with Shay Estrada) with update given and discharge information. Informed that [facility] is en-route to return patient to facility.

## 2018-04-14 NOTE — DISCHARGE INSTRUCTIONS
Heart Failure: Care Instructions  Your Care Instructions    Heart failure occurs when your heart does not pump as much blood as the body needs. Failure does not mean that the heart has stopped pumping but rather that it is not pumping as well as it should. Over time, this causes fluid buildup in your lungs and other parts of your body. Fluid buildup can cause shortness of breath, fatigue, swollen ankles, and other problems. By taking medicines regularly, reducing sodium (salt) in your diet, checking your weight every day, and making lifestyle changes, you can feel better and live longer. Follow-up care is a key part of your treatment and safety. Be sure to make and go to all appointments, and call your doctor if you are having problems. It's also a good idea to know your test results and keep a list of the medicines you take. How can you care for yourself at home? Medicines  ? · Be safe with medicines. Take your medicines exactly as prescribed. Call your doctor if you think you are having a problem with your medicine. ? · Do not take any vitamins, over-the-counter medicine, or herbal products without talking to your doctor first. Corinna Jane not take ibuprofen (Advil or Motrin) and naproxen (Aleve) without talking to your doctor first. They could make your heart failure worse. ? · You may be taking some of the following medicine. ¨ Beta-blockers can slow heart rate, decrease blood pressure, and improve your condition. Taking a beta-blocker may lower your chance of needing to be hospitalized. ¨ Angiotensin-converting enzyme inhibitors (ACEIs) reduce the heart's workload, lower blood pressure, and reduce swelling. Taking an ACEI may lower your chance of needing to be hospitalized again. ¨ Angiotensin II receptor blockers (ARBs) work like ACEIs. Your doctor may prescribe them instead of ACEIs. ¨ Diuretics, also called water pills, reduce swelling.   ¨ Potassium supplements replace this important mineral, which is sometimes lost with diuretics. ¨ Aspirin and other blood thinners prevent blood clots, which can cause a stroke or heart attack. ? You will get more details on the specific medicines your doctor prescribes. Diet  ? · Your doctor may suggest that you limit sodium to 2,000 milligrams (mg) a day or less. That is less than 1 teaspoon of salt a day, including all the salt you eat in cooking or in packaged foods. People get most of their sodium from processed foods. Fast food and restaurant meals also tend to be very high in sodium. ? · Ask your doctor how much liquid you can drink each day. You may have to limit liquids. ?Weight  ? · Weigh yourself without clothing at the same time each day. Record your weight. Call your doctor if you have a sudden weight gain, such as more than 2 to 3 pounds in a day or 5 pounds in a week. (Your doctor may suggest a different range of weight gain.) A sudden weight gain may mean that your heart failure is getting worse. ? Activity level  ? · Start light exercise (if your doctor says it is okay). Even if you can only do a small amount, exercise will help you get stronger, have more energy, and manage your weight and your stress. Walking is an easy way to get exercise. Start out by walking a little more than you did before. Bit by bit, increase the amount you walk. ? · When you exercise, watch for signs that your heart is working too hard. You are pushing yourself too hard if you cannot talk while you are exercising. If you become short of breath or dizzy or have chest pain, stop, sit down, and rest.   ? · If you feel \"wiped out\" the day after you exercise, walk slower or for a shorter distance until you can work up to a better pace. ? · Get enough rest at night. Sleeping with 1 or 2 pillows under your upper body and head may help you breathe easier. ? Lifestyle changes  ? · Do not smoke. Smoking can make a heart condition worse.  If you need help quitting, talk to your doctor about stop-smoking programs and medicines. These can increase your chances of quitting for good. Quitting smoking may be the most important step you can take to protect your heart. ? · Limit alcohol to 2 drinks a day for men and 1 drink a day for women. Too much alcohol can cause health problems. ? · Avoid getting sick from colds and the flu. Get a pneumococcal vaccine shot. If you have had one before, ask your doctor whether you need another dose. Get a flu shot each year. If you must be around people with colds or the flu, wash your hands often. When should you call for help? Call 911 if you have symptoms of sudden heart failure such as:  ? · You have severe trouble breathing. ? · You cough up pink, foamy mucus. ? · You have a new irregular or rapid heartbeat. ?Call your doctor now or seek immediate medical care if:  ? · You have new or increased shortness of breath. ? · You are dizzy or lightheaded, or you feel like you may faint. ? · You have sudden weight gain, such as more than 2 to 3 pounds in a day or 5 pounds in a week. (Your doctor may suggest a different range of weight gain.)   ? · You have increased swelling in your legs, ankles, or feet. ? · You are suddenly so tired or weak that you cannot do your usual activities. ? Watch closely for changes in your health, and be sure to contact your doctor if you develop new symptoms. Where can you learn more? Go to http://zuri-jesus.info/. Enter G332 in the search box to learn more about \"Heart Failure: Care Instructions. \"  Current as of: September 21, 2016  Content Version: 11.4  © 2225-0160 [employer]. Care instructions adapted under license by [employer] (which disclaims liability or warranty for this information).  If you have questions about a medical condition or this instruction, always ask your healthcare professional. [address]  any warranty or liability for your use of this information.

## 2018-04-15 LAB
ATRIAL RATE: 76 BPM
CALCULATED R AXIS, ECG10: 17 DEGREES
CALCULATED T AXIS, ECG11: -6 DEGREES
DIAGNOSIS, 93000: NORMAL
P-R INTERVAL, ECG05: 202 MS
Q-T INTERVAL, ECG07: 380 MS
QRS DURATION, ECG06: 90 MS
QTC CALCULATION (BEZET), ECG08: 427 MS
VENTRICULAR RATE, ECG03: 76 BPM

## 2018-04-16 ENCOUNTER — HOSPITAL ENCOUNTER (OUTPATIENT)
Dept: LAB | Age: 73
Discharge: HOME | End: 2018-04-16

## 2018-04-16 ENCOUNTER — HOSPITAL ENCOUNTER (OUTPATIENT)
Dept: LAB | Age: 73
Discharge: HOME | End: 2018-04-16
Payer: MEDICARE

## 2018-04-16 LAB
ALBUMIN SERPL-MCNC: 3.2 G/DL (ref 3.4–5)
ALBUMIN/GLOB SERPL: 1 {RATIO} (ref 0.8–1.7)
ALP SERPL-CCNC: 75 U/L (ref 45–117)
ALT SERPL-CCNC: 19 U/L (ref 16–61)
ANION GAP SERPL CALC-SCNC: 5 MMOL/L (ref 3–18)
AST SERPL-CCNC: 14 U/L (ref 15–37)
BASOPHILS # BLD: 0.1 K/UL (ref 0–0.06)
BASOPHILS NFR BLD: 1 % (ref 0–2)
BILIRUB SERPL-MCNC: 0.4 MG/DL (ref 0.2–1)
BUN SERPL-MCNC: 16 MG/DL (ref 7–18)
BUN/CREAT SERPL: 13 (ref 12–20)
CALCIUM SERPL-MCNC: 8.7 MG/DL (ref 8.5–10.1)
CHLORIDE SERPL-SCNC: 108 MMOL/L (ref 100–108)
CO2 SERPL-SCNC: 34 MMOL/L (ref 21–32)
CREAT SERPL-MCNC: 1.21 MG/DL (ref 0.6–1.3)
CRP SERPL-MCNC: 0.6 MG/DL (ref 0–0.3)
DATE LAST DOSE: NORMAL
DIFFERENTIAL METHOD BLD: ABNORMAL
EOSINOPHIL # BLD: 0.6 K/UL (ref 0–0.4)
EOSINOPHIL NFR BLD: 9 % (ref 0–5)
ERYTHROCYTE [DISTWIDTH] IN BLOOD BY AUTOMATED COUNT: 14.4 % (ref 11.6–14.5)
ERYTHROCYTE [SEDIMENTATION RATE] IN BLOOD: 37 MM/HR (ref 0–20)
GLOBULIN SER CALC-MCNC: 3.3 G/DL (ref 2–4)
GLUCOSE SERPL-MCNC: 167 MG/DL (ref 74–99)
HCT VFR BLD AUTO: 33.6 % (ref 36–48)
HGB BLD-MCNC: 10.6 G/DL (ref 13–16)
LYMPHOCYTES # BLD: 1.2 K/UL (ref 0.9–3.6)
LYMPHOCYTES NFR BLD: 17 % (ref 21–52)
MCH RBC QN AUTO: 29.8 PG (ref 24–34)
MCHC RBC AUTO-ENTMCNC: 31.5 G/DL (ref 31–37)
MCV RBC AUTO: 94.4 FL (ref 74–97)
MONOCYTES # BLD: 0.4 K/UL (ref 0.05–1.2)
MONOCYTES NFR BLD: 6 % (ref 3–10)
NEUTS SEG # BLD: 4.8 K/UL (ref 1.8–8)
NEUTS SEG NFR BLD: 67 % (ref 40–73)
PLATELET # BLD AUTO: 172 K/UL (ref 135–420)
PMV BLD AUTO: 9.9 FL (ref 9.2–11.8)
POTASSIUM SERPL-SCNC: 3.8 MMOL/L (ref 3.5–5.5)
PROT SERPL-MCNC: 6.5 G/DL (ref 6.4–8.2)
RBC # BLD AUTO: 3.56 M/UL (ref 4.7–5.5)
REPORTED DOSE,DOSE: NORMAL UNITS
REPORTED DOSE/TIME,TMG: 900
SODIUM SERPL-SCNC: 147 MMOL/L (ref 136–145)
VANCOMYCIN TROUGH SERPL-MCNC: 17 UG/ML (ref 10–20)
WBC # BLD AUTO: 7.2 K/UL (ref 4.6–13.2)

## 2018-04-16 PROCEDURE — 85652 RBC SED RATE AUTOMATED: CPT | Performed by: FAMILY MEDICINE

## 2018-04-16 PROCEDURE — 85025 COMPLETE CBC W/AUTO DIFF WBC: CPT | Performed by: FAMILY MEDICINE

## 2018-04-16 PROCEDURE — 86140 C-REACTIVE PROTEIN: CPT | Performed by: FAMILY MEDICINE

## 2018-04-16 PROCEDURE — 80202 ASSAY OF VANCOMYCIN: CPT | Performed by: FAMILY MEDICINE

## 2018-04-16 PROCEDURE — 80053 COMPREHEN METABOLIC PANEL: CPT | Performed by: FAMILY MEDICINE

## 2018-04-18 ENCOUNTER — HOSPITAL ENCOUNTER (OUTPATIENT)
Dept: LAB | Age: 73
Discharge: HOME | End: 2018-04-18

## 2018-04-18 LAB
DATE LAST DOSE: NORMAL
REPORTED DOSE,DOSE: NORMAL UNITS
REPORTED DOSE/TIME,TMG: 2100
VANCOMYCIN TROUGH SERPL-MCNC: 14.6 UG/ML (ref 10–20)

## 2018-04-18 PROCEDURE — 80202 ASSAY OF VANCOMYCIN: CPT | Performed by: FAMILY MEDICINE

## 2018-04-20 ENCOUNTER — HOSPITAL ENCOUNTER (OUTPATIENT)
Dept: LAB | Age: 73
Discharge: HOME | End: 2018-04-20

## 2018-04-20 LAB
DATE LAST DOSE: NORMAL
REPORTED DOSE,DOSE: NORMAL UNITS
REPORTED DOSE/TIME,TMG: 2100
VANCOMYCIN TROUGH SERPL-MCNC: 15.2 UG/ML (ref 10–20)

## 2018-04-20 PROCEDURE — 80202 ASSAY OF VANCOMYCIN: CPT | Performed by: FAMILY MEDICINE

## 2018-04-22 ENCOUNTER — HOSPITAL ENCOUNTER (OUTPATIENT)
Dept: LAB | Age: 73
Discharge: HOME | End: 2018-04-22

## 2018-04-22 LAB
DATE LAST DOSE: NORMAL
REPORTED DOSE,DOSE: NORMAL UNITS
REPORTED DOSE/TIME,TMG: NORMAL
VANCOMYCIN TROUGH SERPL-MCNC: 13.8 UG/ML (ref 10–20)

## 2018-04-22 PROCEDURE — 80202 ASSAY OF VANCOMYCIN: CPT | Performed by: FAMILY MEDICINE

## 2018-04-23 ENCOUNTER — HOSPITAL ENCOUNTER (OUTPATIENT)
Dept: LAB | Age: 73
Discharge: HOME | End: 2018-04-23

## 2018-04-23 LAB
ANION GAP SERPL CALC-SCNC: 6 MMOL/L (ref 3–18)
BUN SERPL-MCNC: 18 MG/DL (ref 7–18)
BUN/CREAT SERPL: 16 (ref 12–20)
CALCIUM SERPL-MCNC: 8.5 MG/DL (ref 8.5–10.1)
CHLORIDE SERPL-SCNC: 111 MMOL/L (ref 100–108)
CO2 SERPL-SCNC: 31 MMOL/L (ref 21–32)
CREAT SERPL-MCNC: 1.14 MG/DL (ref 0.6–1.3)
GLUCOSE SERPL-MCNC: 136 MG/DL (ref 74–99)
POTASSIUM SERPL-SCNC: 4.4 MMOL/L (ref 3.5–5.5)
SODIUM SERPL-SCNC: 148 MMOL/L (ref 136–145)

## 2018-04-23 PROCEDURE — 80048 BASIC METABOLIC PNL TOTAL CA: CPT | Performed by: FAMILY MEDICINE

## 2018-04-24 ENCOUNTER — HOSPITAL ENCOUNTER (OUTPATIENT)
Dept: LAB | Age: 73
Discharge: HOME | End: 2018-04-24

## 2018-04-24 LAB
DATE LAST DOSE: NORMAL
REPORTED DOSE,DOSE: NORMAL UNITS
REPORTED DOSE/TIME,TMG: 900
VANCOMYCIN TROUGH SERPL-MCNC: 19 UG/ML (ref 10–20)

## 2018-04-24 PROCEDURE — 80202 ASSAY OF VANCOMYCIN: CPT | Performed by: FAMILY MEDICINE

## 2018-04-26 ENCOUNTER — HOSPITAL ENCOUNTER (OUTPATIENT)
Dept: LAB | Age: 73
Discharge: HOME | End: 2018-04-26

## 2018-04-26 LAB
DATE LAST DOSE: NORMAL
REPORTED DOSE,DOSE: NORMAL UNITS
REPORTED DOSE/TIME,TMG: 2100
VANCOMYCIN TROUGH SERPL-MCNC: 18 UG/ML (ref 10–20)

## 2018-04-26 PROCEDURE — 80202 ASSAY OF VANCOMYCIN: CPT | Performed by: FAMILY MEDICINE

## 2018-04-27 ENCOUNTER — HOSPITAL ENCOUNTER (OUTPATIENT)
Dept: LAB | Age: 73
Discharge: HOME | End: 2018-04-27

## 2018-04-27 LAB
DATE LAST DOSE: NORMAL
REPORTED DOSE,DOSE: NORMAL UNITS
REPORTED DOSE/TIME,TMG: 900
VANCOMYCIN TROUGH SERPL-MCNC: 19.6 UG/ML (ref 10–20)

## 2018-04-27 PROCEDURE — 80202 ASSAY OF VANCOMYCIN: CPT | Performed by: FAMILY MEDICINE

## 2018-04-29 ENCOUNTER — HOSPITAL ENCOUNTER (OUTPATIENT)
Dept: LAB | Age: 73
Discharge: HOME | End: 2018-04-29
Payer: MEDICARE

## 2018-04-29 LAB
FAX TO INFO,FAXT: NORMAL
FAX TO NUMBER,FAXN: NORMAL

## 2018-04-29 PROCEDURE — 80202 ASSAY OF VANCOMYCIN: CPT | Performed by: FAMILY MEDICINE

## 2018-04-30 ENCOUNTER — HOSPITAL ENCOUNTER (OUTPATIENT)
Dept: LAB | Age: 73
Discharge: HOME | End: 2018-04-30

## 2018-04-30 LAB
ALBUMIN SERPL-MCNC: 3.1 G/DL (ref 3.4–5)
ALBUMIN/GLOB SERPL: 1.1 {RATIO} (ref 0.8–1.7)
ALP SERPL-CCNC: 68 U/L (ref 45–117)
ALT SERPL-CCNC: 17 U/L (ref 16–61)
ANION GAP SERPL CALC-SCNC: 7 MMOL/L (ref 3–18)
AST SERPL-CCNC: 13 U/L (ref 15–37)
BASOPHILS # BLD: 0.1 K/UL (ref 0–0.06)
BASOPHILS NFR BLD: 1 % (ref 0–2)
BILIRUB SERPL-MCNC: 0.5 MG/DL (ref 0.2–1)
BUN SERPL-MCNC: 15 MG/DL (ref 7–18)
BUN/CREAT SERPL: 12 (ref 12–20)
CALCIUM SERPL-MCNC: 8.3 MG/DL (ref 8.5–10.1)
CHLORIDE SERPL-SCNC: 109 MMOL/L (ref 100–108)
CO2 SERPL-SCNC: 30 MMOL/L (ref 21–32)
CREAT SERPL-MCNC: 1.26 MG/DL (ref 0.6–1.3)
CRP SERPL-MCNC: 0.4 MG/DL (ref 0–0.3)
DATE LAST DOSE: ABNORMAL
DIFFERENTIAL METHOD BLD: ABNORMAL
EOSINOPHIL # BLD: 0.4 K/UL (ref 0–0.4)
EOSINOPHIL NFR BLD: 7 % (ref 0–5)
ERYTHROCYTE [DISTWIDTH] IN BLOOD BY AUTOMATED COUNT: 14.4 % (ref 11.6–14.5)
ERYTHROCYTE [SEDIMENTATION RATE] IN BLOOD: 3 MM/HR (ref 0–20)
GLOBULIN SER CALC-MCNC: 2.9 G/DL (ref 2–4)
GLUCOSE SERPL-MCNC: 212 MG/DL (ref 74–99)
HCT VFR BLD AUTO: 32.4 % (ref 36–48)
HGB BLD-MCNC: 10.4 G/DL (ref 13–16)
LYMPHOCYTES # BLD: 1.1 K/UL (ref 0.9–3.6)
LYMPHOCYTES NFR BLD: 19 % (ref 21–52)
MCH RBC QN AUTO: 29.9 PG (ref 24–34)
MCHC RBC AUTO-ENTMCNC: 32.1 G/DL (ref 31–37)
MCV RBC AUTO: 93.1 FL (ref 74–97)
MONOCYTES # BLD: 0.5 K/UL (ref 0.05–1.2)
MONOCYTES NFR BLD: 8 % (ref 3–10)
NEUTS SEG # BLD: 3.9 K/UL (ref 1.8–8)
NEUTS SEG NFR BLD: 65 % (ref 40–73)
PLATELET # BLD AUTO: 138 K/UL (ref 135–420)
PMV BLD AUTO: 10.6 FL (ref 9.2–11.8)
POTASSIUM SERPL-SCNC: 3.9 MMOL/L (ref 3.5–5.5)
PROT SERPL-MCNC: 6 G/DL (ref 6.4–8.2)
RBC # BLD AUTO: 3.48 M/UL (ref 4.7–5.5)
REPORTED DOSE,DOSE: ABNORMAL UNITS
REPORTED DOSE/TIME,TMG: 900
SODIUM SERPL-SCNC: 146 MMOL/L (ref 136–145)
VANCOMYCIN TROUGH SERPL-MCNC: 20.5 UG/ML (ref 10–20)
WBC # BLD AUTO: 6 K/UL (ref 4.6–13.2)

## 2018-04-30 PROCEDURE — 86140 C-REACTIVE PROTEIN: CPT | Performed by: FAMILY MEDICINE

## 2018-04-30 PROCEDURE — 80053 COMPREHEN METABOLIC PANEL: CPT | Performed by: FAMILY MEDICINE

## 2018-04-30 PROCEDURE — 85652 RBC SED RATE AUTOMATED: CPT | Performed by: FAMILY MEDICINE

## 2018-04-30 PROCEDURE — 85025 COMPLETE CBC W/AUTO DIFF WBC: CPT | Performed by: FAMILY MEDICINE

## 2018-05-01 ENCOUNTER — HOSPITAL ENCOUNTER (OUTPATIENT)
Dept: LAB | Age: 73
Discharge: HOME | End: 2018-05-01

## 2018-05-01 LAB
DATE LAST DOSE: NORMAL
FAX TO INFO,FAXT: NORMAL
FAX TO NUMBER,FAXN: NORMAL
REPORTED DOSE,DOSE: NORMAL UNITS
REPORTED DOSE/TIME,TMG: 2100
VANCOMYCIN TROUGH SERPL-MCNC: 15.7 UG/ML (ref 10–20)

## 2018-05-01 PROCEDURE — 80202 ASSAY OF VANCOMYCIN: CPT | Performed by: FAMILY MEDICINE

## 2018-05-04 ENCOUNTER — HOSPITAL ENCOUNTER (OUTPATIENT)
Dept: LAB | Age: 73
Discharge: HOME | End: 2018-05-04

## 2018-05-04 LAB
DATE LAST DOSE: NORMAL
REPORTED DOSE,DOSE: NORMAL UNITS
REPORTED DOSE/TIME,TMG: 2100
VANCOMYCIN TROUGH SERPL-MCNC: 15.3 UG/ML (ref 10–20)

## 2018-05-04 PROCEDURE — 80202 ASSAY OF VANCOMYCIN: CPT | Performed by: INTERNAL MEDICINE

## 2018-05-06 ENCOUNTER — HOME HEALTH ADMISSION (OUTPATIENT)
Dept: HOME HEALTH SERVICES | Facility: HOME HEALTH | Age: 73
End: 2018-05-06

## 2018-05-07 ENCOUNTER — HOME CARE VISIT (OUTPATIENT)
Dept: HOME HEALTH SERVICES | Facility: HOME HEALTH | Age: 73
End: 2018-05-07

## 2019-01-01 NOTE — PROCEDURES
LHC and coronary angiogram done via left radial artery. Left main has luminal irregularities. Ostial % occluded. Ostial LCx has patent stent, stent extends up to mid Left main. OM1 has patent stent in ostial-proximal part. Ramus has segmental 95% stenosis in ostial-proximal portion. The ostium of ramus is jailed by left main-LCx stent. RCA Proximal RCA is 100% occluded. ()  SVG to RCA known to occluded 100% from previous study. LIMA to LAD is patent. PLUNKETT has 2 loops prior to tough down. Native LAD has 60% diffuse stenosis and has focal 75% stenosis in mid to distal LAD. The distal LAD is small caliber vessel. Left to right collaterals seen. The native LAD lesion in unchanged since 2012. LVEDP was 22 mm hg.  He needs aggressive medical management. Aspirin, beta blocker, Nitrates and statin. Start Lasix 40 mg PO once a day. Good BP and DM control. Discontinue coreg and start Metoprolol tartrate 25 mg PO BID and titrate as per HR/BP. Plan discussed with patient and he verbally understood.
[facility]  *** FINAL REPORT ***    Name: Megan Gutierrez  MRN: [id number]    Inpatient  : 29 Oct 1945  HIS Order #: [id number]  [place] Visit #: [id number]  Date: 2018    TYPE OF TEST: Peripheral Venous Testing    REASON FOR TEST  Pain in limb, Limb swelling    Right Leg:-  Deep venous thrombosis:           No  Superficial venous thrombosis:    No  Deep venous insufficiency:        Not examined  Superficial venous insufficiency: Not examined      INTERPRETATION/FINDINGS  Duplex images were obtained using 2-D gray scale, color flow, and  spectral Doppler analysis. Right leg :  1. Deep vein(s) visualized include the common femoral, deep femoral,  proximal femoral, mid femoral, distal femoral, popliteal(above knee),  popliteal(fossa), popliteal(below knee) and posterior tibial veins. 2. No evidence of deep venous thrombosis detected in the veins  visualized. 3. No evidence of deep vein thrombosis in the contralateral common  femoral vein. 4. Superficial vein(s) visualized include the great saphenous vein. 5. No evidence of superficial thrombosis detected. ADDITIONAL COMMENTS    I have personally reviewed the data relevant to the interpretation of  this  study.     TECHNOLOGIST: Cesilia Rosa  Signed: 2018 06:54 PM    PHYSICIAN: Jimbo Calixto MD  Signed: 2018 01:25 PM
[facility]  *** FINAL REPORT ***    Name: Roel Becker  MRN: [id number]    Inpatient  : 29 Oct 1945  HIS Order #: [id number]  [address] [facility] Visit #: [id number]  Date: 2018    TYPE OF TEST: Peripheral Venous Testing    REASON FOR TEST  Pain in limb    Right Leg:-  Deep venous thrombosis:           No  Superficial venous thrombosis:    No  Deep venous insufficiency:        Not examined  Superficial venous insufficiency: Not examined      INTERPRETATION/FINDINGS  Duplex images were obtained using 2-D gray scale, color flow, and  spectral Doppler analysis. Right leg :  1. Deep vein(s) visualized include the common femoral, deep femoral,  proximal femoral, mid femoral, distal femoral, popliteal(above knee),  popliteal(fossa) and popliteal(below knee) veins. 2. No evidence of deep venous thrombosis detected in the veins  visualized. 3. No evidence of deep vein thrombosis in the contralateral common  femoral vein. 4. Superficial vein(s) visualized include the great saphenous vein. 5. No evidence of superficial thrombosis detected. ADDITIONAL COMMENTS  ADDITIONAL COMMENTS:  1. The right calf veins were not well seen due to lower extremity  edema. I have personally reviewed the data relevant to the interpretation of  this  study.     TECHNOLOGIST: Eladio Peters RDCS, RVT  Signed: 2018 02:18 PM    PHYSICIAN: Eliz Cr MD  Signed: 2018 12:00 PM
[facility] [employer]

## 2021-04-13 NOTE — PROGRESS NOTES
No not yet,  [place] been [place] of [place] and just got back in     Is aware of it though Pharmacy Dosing Services:  Vancomycin   Indication: Cellulitis/osteomyelitis/diabetic foot infection  Scr = 0.94   CrCl  = 99 ml/min     Current dose:  Vancomycin 1750 mg IV every 12 hours. Most recent Vancomycin Trough: 15.8 (3/27/18 at 21:55)    Original Vancomycin Consult ordered for 7 days. Day of therapy: 7 of 7     Please note:  Based on original consult, therapy will end after evening dose on 3/28/18.    If Vancomycin dosing is to continue beyond 3/28/18, a new Vancomycin Consult extending therapy will need to be ordered by MD.     Verlyn Ahumada, PharmD    [phone]

## 2021-07-14 ENCOUNTER — TRANSCRIBE ORDER (OUTPATIENT)
Dept: REGISTRATION | Age: 76
End: 2021-07-14

## 2021-07-14 ENCOUNTER — HOSPITAL ENCOUNTER (OUTPATIENT)
Dept: PREADMISSION TESTING | Age: 76
Discharge: HOME | End: 2021-07-14
Payer: MEDICARE

## 2021-07-14 DIAGNOSIS — Z01.818 PREOPERATIVE EXAMINATION, UNSPECIFIED: ICD-10-CM

## 2021-07-14 DIAGNOSIS — Z01.818 PREOPERATIVE EXAMINATION, UNSPECIFIED: Primary | ICD-10-CM

## 2021-07-14 LAB
ALBUMIN SERPL-MCNC: 3.5 G/DL (ref 3.4–5)
ALBUMIN/GLOB SERPL: 1.1 {RATIO} (ref 0.8–1.7)
ALP SERPL-CCNC: 72 U/L (ref 45–117)
ALT SERPL-CCNC: 34 U/L (ref 16–61)
ANION GAP SERPL CALC-SCNC: 7 MMOL/L (ref 3–18)
AST SERPL-CCNC: 19 U/L (ref 10–38)
ATRIAL RATE: 77 BPM
BILIRUB SERPL-MCNC: 0.3 MG/DL (ref 0.2–1)
BUN SERPL-MCNC: 32 MG/DL (ref 7–18)
BUN/CREAT SERPL: 30 (ref 12–20)
CALCIUM SERPL-MCNC: 9 MG/DL (ref 8.5–10.1)
CALCULATED P AXIS, ECG09: 65 DEGREES
CALCULATED R AXIS, ECG10: 57 DEGREES
CALCULATED T AXIS, ECG11: 13 DEGREES
CHLORIDE SERPL-SCNC: 106 MMOL/L (ref 100–111)
CO2 SERPL-SCNC: 29 MMOL/L (ref 21–32)
CREAT SERPL-MCNC: 1.08 MG/DL (ref 0.6–1.3)
DIAGNOSIS, 93000: NORMAL
ERYTHROCYTE [DISTWIDTH] IN BLOOD BY AUTOMATED COUNT: 15.4 % (ref 11.6–14.5)
GLOBULIN SER CALC-MCNC: 3.1 G/DL (ref 2–4)
GLUCOSE SERPL-MCNC: 186 MG/DL (ref 74–99)
HBA1C MFR BLD: 6.3 % (ref 4.2–5.6)
HCT VFR BLD AUTO: 32.8 % (ref 36–48)
HGB BLD-MCNC: 10.3 G/DL (ref 13–16)
MCH RBC QN AUTO: 30.9 PG (ref 24–34)
MCHC RBC AUTO-ENTMCNC: 31.4 G/DL (ref 31–37)
MCV RBC AUTO: 98.5 FL (ref 74–97)
P-R INTERVAL, ECG05: 246 MS
PLATELET # BLD AUTO: 187 K/UL (ref 135–420)
PMV BLD AUTO: 10.5 FL (ref 9.2–11.8)
POTASSIUM SERPL-SCNC: 4.8 MMOL/L (ref 3.5–5.5)
PROT SERPL-MCNC: 6.6 G/DL (ref 6.4–8.2)
Q-T INTERVAL, ECG07: 386 MS
QRS DURATION, ECG06: 106 MS
QTC CALCULATION (BEZET), ECG08: 436 MS
RBC # BLD AUTO: 3.33 M/UL (ref 4.35–5.65)
SODIUM SERPL-SCNC: 142 MMOL/L (ref 136–145)
VENTRICULAR RATE, ECG03: 77 BPM
WBC # BLD AUTO: 5.2 K/UL (ref 4.6–13.2)

## 2021-07-14 PROCEDURE — 80053 COMPREHEN METABOLIC PANEL: CPT

## 2021-07-14 PROCEDURE — 36415 COLL VENOUS BLD VENIPUNCTURE: CPT

## 2021-07-14 PROCEDURE — 85027 COMPLETE CBC AUTOMATED: CPT

## 2021-07-14 PROCEDURE — 83036 HEMOGLOBIN GLYCOSYLATED A1C: CPT

## 2021-07-14 PROCEDURE — 93005 ELECTROCARDIOGRAM TRACING: CPT

## 2021-07-20 ENCOUNTER — ANESTHESIA EVENT (OUTPATIENT)
Dept: SURGERY | Age: 76
End: 2021-07-20
Payer: MEDICARE

## 2021-07-20 ENCOUNTER — HOSPITAL ENCOUNTER (OUTPATIENT)
Age: 76
Setting detail: OUTPATIENT SURGERY
Discharge: HOME | End: 2021-07-20
Attending: UROLOGY | Admitting: UROLOGY
Payer: MEDICARE

## 2021-07-20 ENCOUNTER — ANESTHESIA (OUTPATIENT)
Dept: SURGERY | Age: 76
End: 2021-07-20
Payer: MEDICARE

## 2021-07-20 ENCOUNTER — APPOINTMENT (OUTPATIENT)
Dept: GENERAL RADIOLOGY | Age: 76
End: 2021-07-20
Attending: UROLOGY
Payer: MEDICARE

## 2021-07-20 VITALS
BODY MASS INDEX: 29.91 KG/M2 | HEIGHT: 73 IN | HEART RATE: 75 BPM | WEIGHT: 225.7 LBS | RESPIRATION RATE: 16 BRPM | SYSTOLIC BLOOD PRESSURE: 114 MMHG | TEMPERATURE: 97.2 F | DIASTOLIC BLOOD PRESSURE: 69 MMHG | OXYGEN SATURATION: 99 %

## 2021-07-20 DIAGNOSIS — N20.1 CALCULUS OF URETER: Primary | ICD-10-CM

## 2021-07-20 LAB
GLUCOSE BLD STRIP.AUTO-MCNC: 149 MG/DL (ref 70–110)
GLUCOSE BLD STRIP.AUTO-MCNC: 173 MG/DL (ref 70–110)

## 2021-07-20 PROCEDURE — 74011250636 HC RX REV CODE- 250/636: Performed by: UROLOGY

## 2021-07-20 PROCEDURE — 74011250636 HC RX REV CODE- 250/636: Performed by: SPECIALIST

## 2021-07-20 PROCEDURE — C1769 GUIDE WIRE: HCPCS | Performed by: UROLOGY

## 2021-07-20 PROCEDURE — 77030040361 HC SLV COMPR DVT MDII -B: Performed by: UROLOGY

## 2021-07-20 PROCEDURE — 76060000032 HC ANESTHESIA 0.5 TO 1 HR: Performed by: UROLOGY

## 2021-07-20 PROCEDURE — 77030012508 HC MSK AIRWY LMA AMBU -A: Performed by: SPECIALIST

## 2021-07-20 PROCEDURE — 76010000138 HC OR TIME 0.5 TO 1 HR: Performed by: UROLOGY

## 2021-07-20 PROCEDURE — 77030010103 HC SEAL PRT ENDOSC OCOA -B: Performed by: UROLOGY

## 2021-07-20 PROCEDURE — C2617 STENT, NON-COR, TEM W/O DEL: HCPCS | Performed by: UROLOGY

## 2021-07-20 PROCEDURE — 82962 GLUCOSE BLOOD TEST: CPT

## 2021-07-20 PROCEDURE — 2709999900 HC NON-CHARGEABLE SUPPLY: Performed by: UROLOGY

## 2021-07-20 PROCEDURE — 77030006974 HC BSKT URET RTVR BSC -C: Performed by: UROLOGY

## 2021-07-20 PROCEDURE — 74011000250 HC RX REV CODE- 250: Performed by: NURSE ANESTHETIST, CERTIFIED REGISTERED

## 2021-07-20 PROCEDURE — 74011000636 HC RX REV CODE- 636: Performed by: UROLOGY

## 2021-07-20 PROCEDURE — 76210000016 HC OR PH I REC 1 TO 1.5 HR: Performed by: UROLOGY

## 2021-07-20 PROCEDURE — 82360 CALCULUS ASSAY QUANT: CPT

## 2021-07-20 PROCEDURE — 74420 UROGRAPHY RTRGR +-KUB: CPT

## 2021-07-20 PROCEDURE — 76210000021 HC REC RM PH II 0.5 TO 1 HR: Performed by: UROLOGY

## 2021-07-20 PROCEDURE — C1758 CATHETER, URETERAL: HCPCS | Performed by: UROLOGY

## 2021-07-20 PROCEDURE — 74011636637 HC RX REV CODE- 636/637: Performed by: UROLOGY

## 2021-07-20 PROCEDURE — 74011250636 HC RX REV CODE- 250/636: Performed by: NURSE ANESTHETIST, CERTIFIED REGISTERED

## 2021-07-20 PROCEDURE — 77030020782 HC GWN BAIR PAWS FLX 3M -B: Performed by: UROLOGY

## 2021-07-20 DEVICE — VARIABLE LENGTH URETERAL STENT
Type: IMPLANTABLE DEVICE | Site: URETER | Status: FUNCTIONAL
Brand: CONTOUR VL™

## 2021-07-20 RX ORDER — SODIUM CHLORIDE, SODIUM LACTATE, POTASSIUM CHLORIDE, CALCIUM CHLORIDE 600; 310; 30; 20 MG/100ML; MG/100ML; MG/100ML; MG/100ML
125 INJECTION, SOLUTION INTRAVENOUS CONTINUOUS
Status: DISCONTINUED | OUTPATIENT
Start: 2021-07-20 | End: 2021-07-20 | Stop reason: HOSPADM

## 2021-07-20 RX ORDER — LEVOFLOXACIN 250 MG/1
250 TABLET ORAL DAILY
Qty: 3 TABLET | Refills: 0 | Status: SHIPPED | OUTPATIENT
Start: 2021-07-21 | End: 2021-07-24

## 2021-07-20 RX ORDER — NALOXONE HYDROCHLORIDE 0.4 MG/ML
0.1 INJECTION, SOLUTION INTRAMUSCULAR; INTRAVENOUS; SUBCUTANEOUS
Status: DISCONTINUED | OUTPATIENT
Start: 2021-07-20 | End: 2021-07-20 | Stop reason: HOSPADM

## 2021-07-20 RX ORDER — SODIUM CHLORIDE, SODIUM LACTATE, POTASSIUM CHLORIDE, CALCIUM CHLORIDE 600; 310; 30; 20 MG/100ML; MG/100ML; MG/100ML; MG/100ML
50 INJECTION, SOLUTION INTRAVENOUS CONTINUOUS
Status: DISCONTINUED | OUTPATIENT
Start: 2021-07-20 | End: 2021-07-20 | Stop reason: HOSPADM

## 2021-07-20 RX ORDER — ONDANSETRON 2 MG/ML
INJECTION INTRAMUSCULAR; INTRAVENOUS AS NEEDED
Status: DISCONTINUED | OUTPATIENT
Start: 2021-07-20 | End: 2021-07-20 | Stop reason: HOSPADM

## 2021-07-20 RX ORDER — MAGNESIUM SULFATE 100 %
4 CRYSTALS MISCELLANEOUS AS NEEDED
Status: DISCONTINUED | OUTPATIENT
Start: 2021-07-20 | End: 2021-07-20 | Stop reason: HOSPADM

## 2021-07-20 RX ORDER — OXYCODONE AND ACETAMINOPHEN 5; 325 MG/1; MG/1
1 TABLET ORAL AS NEEDED
Status: DISCONTINUED | OUTPATIENT
Start: 2021-07-20 | End: 2021-07-20 | Stop reason: HOSPADM

## 2021-07-20 RX ORDER — LIDOCAINE HYDROCHLORIDE 20 MG/ML
INJECTION, SOLUTION EPIDURAL; INFILTRATION; INTRACAUDAL; PERINEURAL AS NEEDED
Status: DISCONTINUED | OUTPATIENT
Start: 2021-07-20 | End: 2021-07-20 | Stop reason: HOSPADM

## 2021-07-20 RX ORDER — FENTANYL CITRATE 50 UG/ML
25 INJECTION, SOLUTION INTRAMUSCULAR; INTRAVENOUS
Status: DISCONTINUED | OUTPATIENT
Start: 2021-07-20 | End: 2021-07-20 | Stop reason: HOSPADM

## 2021-07-20 RX ORDER — HYDROCODONE BITARTRATE AND ACETAMINOPHEN 5; 325 MG/1; MG/1
1 TABLET ORAL
Qty: 20 TABLET | Refills: 0 | Status: SHIPPED | OUTPATIENT
Start: 2021-07-20 | End: 2021-07-25

## 2021-07-20 RX ORDER — HYDROMORPHONE HYDROCHLORIDE 1 MG/ML
0.5 INJECTION, SOLUTION INTRAMUSCULAR; INTRAVENOUS; SUBCUTANEOUS
Status: DISCONTINUED | OUTPATIENT
Start: 2021-07-20 | End: 2021-07-20 | Stop reason: HOSPADM

## 2021-07-20 RX ORDER — LEVOFLOXACIN 5 MG/ML
500 INJECTION, SOLUTION INTRAVENOUS ONCE
Status: COMPLETED | OUTPATIENT
Start: 2021-07-20 | End: 2021-07-20

## 2021-07-20 RX ORDER — INSULIN LISPRO 100 [IU]/ML
INJECTION, SOLUTION INTRAVENOUS; SUBCUTANEOUS ONCE
Status: COMPLETED | OUTPATIENT
Start: 2021-07-20 | End: 2021-07-20

## 2021-07-20 RX ORDER — FENTANYL CITRATE 50 UG/ML
INJECTION, SOLUTION INTRAMUSCULAR; INTRAVENOUS AS NEEDED
Status: DISCONTINUED | OUTPATIENT
Start: 2021-07-20 | End: 2021-07-20 | Stop reason: HOSPADM

## 2021-07-20 RX ORDER — ONDANSETRON 4 MG/1
4 TABLET, ORALLY DISINTEGRATING ORAL
Qty: 10 TABLET | Refills: 0 | Status: SHIPPED | OUTPATIENT
Start: 2021-07-20

## 2021-07-20 RX ORDER — PROPOFOL 10 MG/ML
INJECTION, EMULSION INTRAVENOUS AS NEEDED
Status: DISCONTINUED | OUTPATIENT
Start: 2021-07-20 | End: 2021-07-20 | Stop reason: HOSPADM

## 2021-07-20 RX ORDER — INSULIN LISPRO 100 [IU]/ML
INJECTION, SOLUTION INTRAVENOUS; SUBCUTANEOUS ONCE
Status: DISCONTINUED | OUTPATIENT
Start: 2021-07-20 | End: 2021-07-20 | Stop reason: HOSPADM

## 2021-07-20 RX ORDER — DEXTROSE 50 % IN WATER (D50W) INTRAVENOUS SYRINGE
25-50 AS NEEDED
Status: DISCONTINUED | OUTPATIENT
Start: 2021-07-20 | End: 2021-07-20 | Stop reason: HOSPADM

## 2021-07-20 RX ORDER — ONDANSETRON 2 MG/ML
4 INJECTION INTRAMUSCULAR; INTRAVENOUS ONCE
Status: DISCONTINUED | OUTPATIENT
Start: 2021-07-20 | End: 2021-07-20 | Stop reason: HOSPADM

## 2021-07-20 RX ORDER — MIDAZOLAM HYDROCHLORIDE 1 MG/ML
INJECTION, SOLUTION INTRAMUSCULAR; INTRAVENOUS AS NEEDED
Status: DISCONTINUED | OUTPATIENT
Start: 2021-07-20 | End: 2021-07-20 | Stop reason: HOSPADM

## 2021-07-20 RX ADMIN — SODIUM CHLORIDE, SODIUM LACTATE, POTASSIUM CHLORIDE, AND CALCIUM CHLORIDE 125 ML/HR: 600; 310; 30; 20 INJECTION, SOLUTION INTRAVENOUS at 11:15

## 2021-07-20 RX ADMIN — MIDAZOLAM 1 MG: 1 INJECTION INTRAMUSCULAR; INTRAVENOUS at 12:17

## 2021-07-20 RX ADMIN — PHENYLEPHRINE HYDROCHLORIDE 100 MCG: 10 INJECTION INTRAVENOUS at 12:42

## 2021-07-20 RX ADMIN — FENTANYL CITRATE 50 MCG: 50 INJECTION, SOLUTION INTRAMUSCULAR; INTRAVENOUS at 12:18

## 2021-07-20 RX ADMIN — PHENYLEPHRINE HYDROCHLORIDE 100 MCG: 10 INJECTION INTRAVENOUS at 12:39

## 2021-07-20 RX ADMIN — PHENYLEPHRINE HYDROCHLORIDE 100 MCG: 10 INJECTION INTRAVENOUS at 12:32

## 2021-07-20 RX ADMIN — LIDOCAINE HYDROCHLORIDE 80 MG: 20 INJECTION, SOLUTION INTRAVENOUS at 12:23

## 2021-07-20 RX ADMIN — LEVOFLOXACIN 500 MG: 5 INJECTION, SOLUTION INTRAVENOUS at 12:17

## 2021-07-20 RX ADMIN — FENTANYL CITRATE 25 MCG: 50 INJECTION, SOLUTION INTRAMUSCULAR; INTRAVENOUS at 13:57

## 2021-07-20 RX ADMIN — ONDANSETRON HYDROCHLORIDE 4 MG: 2 INJECTION INTRAMUSCULAR; INTRAVENOUS at 13:09

## 2021-07-20 RX ADMIN — SODIUM CHLORIDE, SODIUM LACTATE, POTASSIUM CHLORIDE, AND CALCIUM CHLORIDE 125 ML/HR: 600; 310; 30; 20 INJECTION, SOLUTION INTRAVENOUS at 14:30

## 2021-07-20 RX ADMIN — SODIUM CHLORIDE, SODIUM LACTATE, POTASSIUM CHLORIDE, AND CALCIUM CHLORIDE: 600; 310; 30; 20 INJECTION, SOLUTION INTRAVENOUS at 13:08

## 2021-07-20 RX ADMIN — INSULIN LISPRO 3 UNITS: 100 INJECTION, SOLUTION INTRAVENOUS; SUBCUTANEOUS at 11:10

## 2021-07-20 RX ADMIN — PROPOFOL 130 MG: 10 INJECTION, EMULSION INTRAVENOUS at 12:23

## 2021-07-20 RX ADMIN — PHENYLEPHRINE HYDROCHLORIDE 100 MCG: 10 INJECTION INTRAVENOUS at 12:23

## 2021-07-20 NOTE — PERIOP NOTES
Reviewed PTA medication list with patient/caregiver and patient/caregiver denies any additional medications. Patient admits to having a responsible adult care for them at home for at least 24 hours after surgery. Patient encouraged to use gown warming system and informed that using said warming gown to regulate body temperature prior to a procedure has been shown to help reduce the risks of blood clots and infection. Patient's pharmacy of choice verified and documented in PTA medication section. Dual skin assessment & fall risk band verification completed with Yumi Humphries.

## 2021-07-20 NOTE — PERIOP NOTES
Voided 290 ml pink urine without difficulty -   Discharge instructions reviewed with patient and family. Opportunity for questions and answers given. No further questions at this time.    [facility] list reviewed for duplicates

## 2021-07-20 NOTE — ANESTHESIA PREPROCEDURE EVALUATION
Relevant Problems   CARDIOVASCULAR   (+) CAD (coronary artery disease)   (+) HTN (hypertension)      ENDOCRINE   (+) Diabetes (HCC)      HEMATOLOGY   (+) Osteomyelitis of right foot (HCC)       Anesthetic History   No history of anesthetic complications            Review of Systems / Medical History  Patient summary reviewed, nursing notes reviewed and pertinent labs reviewed    Pulmonary    COPD: mild    Sleep apnea: No treatment      Pertinent negatives: No asthma     Neuro/Psych   Within defined limits           Cardiovascular    Hypertension    Angina (stable)  CHF    CAD, cardiac stents, CABG and hyperlipidemia    Exercise tolerance: <4 METS  Comments: Cardiac clearance on chart - high risk but optimized   GI/Hepatic/Renal     GERD    Renal disease: CRI       Endo/Other    Diabetes: using insulin         Other Findings              Physical Exam    Airway  Mallampati: II  TM Distance: 4 - 6 cm  Neck ROM: normal range of motion   Mouth opening: Normal     Cardiovascular               Dental    Dentition: Caps/crowns and Poor dentition  Comments: Cracked molar   Pulmonary                 Abdominal         Other Findings            Anesthetic Plan    ASA: 3  Anesthesia type: general          Induction: Intravenous  Anesthetic plan and risks discussed with: Patient and Spouse      High risk but optimized and patient/wife accept increased risk. Cardiac and pulmonary clearances in chart.

## 2021-07-20 NOTE — H&P
[facility]  [address] [address]  Tel: [phone]  Fax: [phone]    Patient : Ene Salter   YOB: 1945         Assessment/Plan  # Detail Type Description    Assessment Calculus of kidney (N20.0). Patient Plan He has an increase in his creatinine level from a few months ago. He has a 6m5k32us stone in distal right ureter. He will proceed with right ureteroscopy and laser lithotripsy and stent. Risks of bleeding, infection, injury, and possible need for future procedures was discussed. Plan Orders UA In Office No Micro to be performed. Obtained on 06/23/2021.         2. Assessment Acute renal insufficiency (N28.9). Patient Plan His baseline creatinine is 1.1. He is now up to 1.88 this wk. We will check the CT stone protocol to make sure there's no hydro or stone that's the cause of this              Additional Visit Information      This 77year old male presents for Kidney and/or Ureteral Stone. History of Present Illness  1. Kidney and/or Ureteral Stone   Onset was 5 months ago. Severity level is severe. It occurs intermittently. The problem is improving. Location of pain is right flank. The pain radiates to the groin and testicle. There is no prior history of stones. No prior pertinent history. Pertinent negatives include chills, constipation, diarrhea, fever, nausea and vomiting. Additional information: CT (sophieara personally reviewed, 2/7/2021) -- 5mm right distal urteral stone with hydro and a small 1mm right renal stone. Fior Elam - 2/10/2021 - 1.18  creat  - 2/16/2021 = 1.10  KUB = negative on 2/10/2020, and 2/17/2020 = negative (personally reviewed)    2/17/2021 -- He feels well. Better than he did 10 days ago, but he has some RLQ discomfort still - but now it is mild. no hematuria or dysuria. 6/23/2021 -- No flank pain.   He ahs had multiple medical issues lately including  recent LE infectin and required a right partial gfoot amputation for infection. He is now on a diuretic.  no gross hematuria. However creat = 1.88 (6/20/21)      Past Medical/Surgical History   (Reviewed, updated)  Disease/disorder Onset Date Management Date Comments   Arthritis       Asthma       Diabetes       Hypertension       Renal disease       heart disease       heart failure       lung disease       foot ulcer         heart double bypass [id number]       2 Stents 2019       foot ulcer surgery         Problem List  Problem List reviewed.      Medications (active prior to today)  Medication Instructions Start Date Stop Date Refilled Elsewhere   Humalog Mix 75-25 KwikPen U-100 insulin 100 unit/mL subcutaneous pen inject 50 units SC BID 04/06/2018   N   Flomax 0.4 mg capsule take 1 capsule by oral route  every day 1/2 hour following the same meal each day 02/10/2021 06/23/2021  N   tramadol 50 mg tablet take 1 tablet by oral route  every 6 hours as needed 02/10/2021 06/23/2021  N   Xarelto 20 mg tablet take 1 tablet by oral route  every day with the evening meal //   Y   aspirin 81 mg tablet,delayed release take 1 tablet by oral route  every day //   Y   bumetanide 1 mg tablet take 1 tablet by oral route  every day //   Y   Calcium 500  500 mg calcium (1,250 mg) tablet take 1 Tablet by oral route  every day //   Y   nitroglycerin 0.4 mg sublingual tablet place 1 tablet by sublingual route at the 1st sign of attack; may repeat every 5 min until relief; if pain persists after 3 tablets in 15 min, prompt medical attention is recommended //   Y   Novolog Mix 70-30 U-100 Insulin 100 unit/mL subcutaneous solution inject by subcutaneous route as per insulin protocol //   Y   oxycodone 10 mg tablet take 1 tablet by oral route  every 4 - 6 hours //   Y   pregabalin 25 mg capsule take 2 capsule by oral route 3 times every day //   Y   rosuvastatin 5 mg tablet take 1 tablet by ORAL route  every day //   Y   Tylenol 325 mg capsule  //   Y   Vitamin C 100 mg tablet  //   Y   Vitamin B-1 50 mg tablet  //   Y   Vitamin D3 10 mcg (400 unit) capsule  //   Y       Medication Reconciliation  Medications reconciled today. Allergies  Ingredient Reaction (Severity) Medication Name Comment   CEPHALEXIN MONOHYDRATE diarrhea Keflex    DOXYCYCLINE Anaphylaxis     SULFAMETHOXAZOLE diarrhea Bactrim    TRIMETHOPRIM diarrhea Bactrim      Reviewed, no changes. Family History   (Reviewed, updated)    Relationship Family Member Name  Age at Death Condition Onset Age Cause of Death   Father    Cancer - lung     Mother    Cancer -stomach 76 (onset)      Social History  (Reviewed, updated)  Tobacco use reviewed. Preferred language is [place]. Marital Status/Family/Social Support  Marital status:      Tobacco use status: Current non-smoker. Smoking status: Never smoker. Tobacco Screening  Patient has never used tobacco. Patient has not used tobacco in the last 30 days. Patient has not used smokeless tobacco in the last 30 days. Smoking Status  Type Smoking Status Usage Per Day Years Used Pack Years Total Pack Years    Never smoker         Alcohol  There is no history of alcohol use. Caffeine  The patient uses caffeine: tea. Review of Systems  System Neg/Pos Details   Constitutional Negative Chills and Fever. ENMT Negative Ear infections and Sore throat. Eyes Negative Blurred vision, Double vision and Eye pain. Respiratory Negative Asthma, Chronic cough, Dyspnea and Wheezing. Cardio Negative Chest pain. GI Negative Constipation, Decreased appetite, Diarrhea, Nausea and Vomiting. Endocrine Negative Cold intolerance, Heat intolerance, Increased thirst and Weight loss. Neuro Negative Headache and Tremors. Psych Negative Anxiety and Depression. Integumentary Negative Itching skin and Rash. MS Negative Back pain and Joint pain. Hema/Lymph Negative Easy bleeding. Reproductive Negative Sexual dysfunction.        Vital Signs Height  Time ft in cm Last Measured Height Position   9:30 AM 6.0 0.00 182.88 04/11/2018 0     Weight/BSA/BMI  Time lb oz kg Context BMI kg/m2 BSA m2   9:30 .00  113.398  33.91      Measured by  Time Measured by   9:30 AM Willie Nielson     Physical Exam  Exam Findings Details   Constitutional Normal Well developed. Neck Exam Normal Inspection - Normal.   Respiratory Normal Inspection - Normal.   Abdomen Normal No abdominal tenderness. Genitourinary * CVA Tenderness - LT. Genitourinary Normal No suprapubic tenderness. Extremity Normal No edema. Psychiatric Normal Orientation - Oriented to time, place, person & situation. Appropriate mood and affect.      Medications (added, continued, or stopped today)  Start Date Medication Directions PRN Status PRN Reason Instruction Stop Date    aspirin 81 mg tablet,delayed release take 1 tablet by oral route  every day N       bumetanide 1 mg tablet take 1 tablet by oral route  every day N       Calcium 500  500 mg calcium (1,250 mg) tablet take 1 Tablet by oral route  every day N      02/10/2021 Flomax 0.4 mg capsule take 1 capsule by oral route  every day 1/2 hour following the same meal each day N   06/23/2021 04/06/2018 Humalog Mix 75-25 KwikPen U-100 insulin 100 unit/mL subcutaneous pen inject 50 units SC BID N       nitroglycerin 0.4 mg sublingual tablet place 1 tablet by sublingual route at the 1st sign of attack; may repeat every 5 min until relief; if pain persists after 3 tablets in 15 min, prompt medical attention is recommended N       Novolog Mix 70-30 U-100 Insulin 100 unit/mL subcutaneous solution inject by subcutaneous route as per insulin protocol N       oxycodone 10 mg tablet take 1 tablet by oral route  every 4 - 6 hours N       pregabalin 25 mg capsule take 2 capsule by oral route 3 times every day N       rosuvastatin 5 mg tablet take 1 tablet by ORAL route  every day N      02/10/2021 tramadol 50 mg tablet take 1 tablet by oral route every 6 hours as needed N   06/23/2021    Tylenol 325 mg capsule  N       Vitamin B-1 50 mg tablet  N       Vitamin C 100 mg tablet  N       Vitamin D3 10 mcg (400 unit) capsule  N       Xarelto 20 mg tablet take 1 tablet by oral route  every day with the evening meal N          Active Patient Care Team Members  Name Contact Agency Type Support Role Relationship Active Date Inactive Date Specialty   Wesley [place]   Emergency Contact Spouse      Stephanie Ochoaricardochris   Patient provider PCP          Provider:        Dewayne Rider MD

## 2021-07-20 NOTE — BRIEF OP NOTE
Brief Postoperative Note    Patient: Paris Rdz. YOB: 1945  MRN: [id number]    Date of Procedure: 7/20/2021     Pre-Op Diagnosis: RIGHT URETERAL STONE, ACUTE RENAL FAILURE    Post-Op Diagnosis: Same as preoperative diagnosis. Procedure(s):  CYSTOSCOPY,RIGHT RETROGRADE PYELOGRAM WITH INTERPRETATION,RIGHT  URETEROSCOPY,LASER LITHOTRIPSY WITH HOLMIUM,STENT PLACEMENT     Surgeon(s):  Pedro Pablo Collins MD    Surgical Assistant: None    Anesthesia: General     Estimated Blood Loss (mL): Minimal    Complications: None    Specimens:   ID Type Source Tests Collected by Time Destination   1 : RIGHT KIDNEY STONES Fresh Kidney  Pedro Pablo Collins MD 7/20/2021 1305 Pathology        Implants:   Implant Name Type Inv. Item Serial No.  Lot No. LRB No. Used Action   Pola Leif [id number] -- [facility] - [id number]  [employer] [facility] [place] CONT[employer] [id number] -- [address] [facility] UROLOGY_ [id number] Right 1 Implanted       Drains: * No LDAs found *    Findings: TIGHT RIGHT UO. LARGE RADIOLUCENT STONE IN DISTAL RIGHT URETER WAS LASERED AND LARGEST FRAGMENTS EXTRACTED.   JUST DUST LEFT BEHIND    Electronically Signed by Gloria Araujo MD on 7/20/2021 at 1:22 PM

## 2021-07-20 NOTE — DISCHARGE INSTRUCTIONS
Resume pre hospital diet  Drink plenty of fluids  Ambulate in house  Shower tomorrow  Take prescriptions as directed  Dr. Lidya West office will call with follow up appointment        DISCHARGE SUMMARY from Nurse    PATIENT INSTRUCTIONS:    After general anesthesia or intravenous sedation, for 24 hours or while taking prescription Narcotics:  · Limit your activities  · Do not drive and operate hazardous machinery  · Do not make important personal or business decisions  · Do  not drink alcoholic beverages  · If you have not urinated within 8 hours after discharge, please contact your surgeon on call. Report the following to your surgeon:  · Excessive pain, swelling, redness or odor of or around the surgical area  · Temperature over 100.5  · Nausea and vomiting lasting longer than 4 hours or if unable to take medications  · Any signs of decreased circulation or nerve impairment to extremity: change in color, persistent  numbness, tingling, coldness or increase pain  · Any questions    What to do at Home:  Recommended activity: Ambulate in house and No driving while on analgesics,     If you experience any of the following symptoms fever, chills, uncontrollable pain, active bleeding ( thick bloody urine ), difficulty urinating, please follow up with Dr. Jaci Eisenberg. *  Please give a list of your current medications to your Primary Care Provider. *  Please update this list whenever your medications are discontinued, doses are      changed, or new medications (including over-the-counter products) are added. *  Please carry medication information at all times in case of emergency situations. These are general instructions for a healthy lifestyle:    No smoking/ No tobacco products/ Avoid exposure to second hand smoke  Surgeon General's Warning:  Quitting smoking now greatly reduces serious risk to your health.     Obesity, smoking, and sedentary lifestyle greatly increases your risk for illness    A healthy diet, regular physical exercise & weight monitoring are important for maintaining a healthy lifestyle    You may be retaining fluid if you have a history of heart failure or if you experience any of the following symptoms:  Weight gain of 3 pounds or more overnight or 5 pounds in a week, increased swelling in our hands or feet or shortness of breath while lying flat in bed. Please call your doctor as soon as you notice any of these symptoms; do not wait until your next office visit. Patient armband removed and shredded    The discharge information has been reviewed with the patient and caregiver. The patient and caregiver verbalized understanding. Discharge medications reviewed with the patient and caregiver and appropriate educational materials and side effects teaching were provided. Learning About COVID-19 and Social Distancing  What is it? Social distancing means putting space between yourself and other people. The recommended distance is 6 feet, or about 2 meters. This also means staying away from any place where people may gather, such as brito or other public gathering places. Why is it important? Social distancing is the best way to reduce the spread of COVID-19. This virus seems to spread from person to person through droplets from coughing and sneezing. So if you keep your distance from others, you're less likely to get it or spread it. And social distancing is important for everyone, not just those who are at high risk of infection, like older people. You might have the virus but not have symptoms. You could then give the infection to someone you come into contact with. How is it done? Putting 6 feet, or about 2 meters, between you and other people is the recommended distance. Also stay away from any place where people may gather, such as brito or other public gathering places. So if possible:  · Work from home, and keep your kids at home. · Don't travel if you don't have to.  And avoid public transportation, ride-shares, and taxis unless you have no choice. · Limit shopping to essentials, like food and medicines. · Wear a cloth face cover if you have to go to a public place like the grocery store or pharmacy. · Don't eat in restaurants. (You can still get takeout or food deliveries.)  · Avoid crowds and busy places. Follow stay-at-home orders or other directions for your area. Where can you learn more? Go to http://www.gray.com/  Enter A133 in the search box to learn more about \"Learning About COVID-19 and Social Distancing. \"  Current as of: December 18, 2020               Content Version: 12.8  © 1367-5333 [employer]. Care instructions adapted under license by "[employer] (which disclaims liability or warranty for this information). If you have questions about a medical condition or this instruction, always ask your healthcare professional. [place] [address] any warranty or liability for your use of this information.     ___________________________________________________________________________________________________________________________________

## 2021-07-20 NOTE — ANESTHESIA POSTPROCEDURE EVALUATION
Post-Anesthesia Evaluation and Assessment    Cardiovascular Function/Vital Signs  Visit Vitals  BP (!) 143/59   Pulse 73   Temp 36.3 °C (97.4 °F)   Resp 12   Ht 6' 1\" (1.854 m)   Wt 102.4 kg (225 lb 11.2 oz)   SpO2 100%   BMI 29.78 kg/m²       Patient is status post Procedure(s):  CYSTOSCOPY,RIGHT RETROGRADE PYELOGRAM WITH INTERPRETATION,RIGHT  URETEROSCOPY,LASER LITHOTRIPSY WITH HOLMIUM,STENT PLACEMENT W/C-ARM (AGILITY). Nausea/Vomiting: Controlled. Postoperative hydration reviewed and adequate. Pain:  Pain Scale 1: FLACC (07/20/21 1417)  Pain Intensity 1: 1 (07/20/21 1417)   Managed. Neurological Status:   Neuro (WDL): Exceptions to WDL (07/20/21 1405)   At baseline. Mental Status and Level of Consciousness: Baseline and appropriate for discharge. Pulmonary Status:   O2 Device: Nasal cannula (07/20/21 1341)   Adequate oxygenation and airway patent. Complications related to anesthesia: None    Post-anesthesia assessment completed. No concerns. Patient has met all discharge requirements.     Signed By: Natacha Diaz MD    July 20, 2021

## 2021-07-21 NOTE — OP NOTES
[facility]  OPERATIVE REPORT    Name:  Eddy Swenson  MR#:   [id number]  :  1945  ACCOUNT #:  [de-identified]  DATE OF SERVICE:  2021    PREOPERATIVE DIAGNOSIS:  Ureteral stone. POSTOPERATIVE DIAGNOSIS:  Ureteral stone. PROCEDURE PERFORMED:  Cystoscopy, right retrograde pyelogram with interpretation, right ureteroscopy with holmium laser lithotripsy and stent placement. SURGEON:  Kinza Kennedy MD    ASSISTANT:  None. ANESTHESIA:  General.    ESTIMATED BLOOD LOSS:  Minimal.    COMPLICATIONS:  None. SPECIMENS REMOVED:  Right kidney stone. IMPLANT:  A 6-[place]izen [place] variable-length stent. DRAINS:  None. FINDINGS:  The patient had a tight right UO. He had a large radiolucent stone in the distal right ureter that was lasered with the largest fragments extracted, just dust was left behind. INDICATIONS:  The patient is a 71-year-old gentleman with a history of stones and a recent finding of increase in his creatinine level. He is not really having any significant pain. He was found on CT to have a 1.4-cm stone in the distal right ureter. He presents for ureteroscopy. PROCEDURE:  Preoperatively, the risks and benefits of surgery were described to the patient. The risks included, but were not limited to bleeding, infection, injury to the bladder, injury to the ureter, injury to the kidney, and possible need for future procedure to be made stone-free. The patient understood the risks and signed the informed consent. The patient was taken to the operating room and placed on the OR table in supine position. He was administered general anesthetic. He was administered intravenous antibiotics. He was placed in the dorsal lithotomy position and prepped and draped in the usual sterile manner. A 22-[place]izen [place] cystoscope and sheath were then inserted transurethrally atraumatically under direct vision using a 30-degree lens. Panendoscopy was done.   The urethra was normal.  The prostatic urethra showed mild-to-moderate enlargement. There were no stones, no tumors, no areas of concern within the bladder. There were grade II bladder trabeculations. Bilateral ureteral orifices were in normal anatomic position. I then cannulated the right ureteral orifice with a 5-Maori open-ended ureteral catheter and retrograde pyelogram was done in the usual manner using 8 mL of Visipaque dye. There was noted to be a filling defect in the distal ureter consistent with a stone. This was totally radiolucent. Proximal to this, there was no hydronephrosis or hydroureter. There was no tortuosity of the ureter. Up in the kidney, there were no other filling defects seen. Next, I passed a sensor wire through the open-ended catheter up to the kidney. The open-ended catheter was removed. The scope was removed. I then used that wire as a safety wire. I then passed a semi-rigid ureteroscope transurethrally atraumatically under direct vision through the urethra into the bladder very gently. I then eased the scope into the right ureteral orifice and gradually advanced it up the ureter until I got into the area where the filling defect was and immediately came upon a very bright yellow stone. Using a 365 micron holmium laser fiber, I broke up the stone in small fragments. With a ZeroTip basket, I extracted out the largest fragments. Only dust and debris were left behind. I advanced the scope a little bit further into the distal ureter and saw no significant fragments. I slowly backed the scope out of the ureter. I removed the scope completely. I then reinserted the cystoscope, this was done under direct vision. I, over the safety wire passed a 6-Maori variable-length stent. The wire was removed. Fluoroscopy confirmed the proximal coil within the kidney. The distal coil was noted to be in the bladder by direct vision.   At this point, I emptied all of the urine out of the bladder and emptied all the stone fragments that had been extracted out. The stone was then sent off for stone analysis. At this point, the patient was then taken out of lithotomy position after the scope was removed and the patient was then taken to Recovery in stable condition after he was revived from anesthesia.       Neena Marsh MD      [id number]  D:  07/20/2021 13:28  T:  07/20/2021 21:14  JOB #:  [id number]

## 2021-07-29 LAB
COLOR STONE: NORMAL
COMMENT, 519994: NORMAL
COMPOSITION, 120440: NORMAL
DISCLAIMER, STO32L: NORMAL
NIDUS STONE QL: NORMAL
PHOTO, 120675: NORMAL
PLEASE NOTE, 130422: NORMAL
SHELL, 120438: NORMAL
SIZE STONE: NORMAL MM
SPECIMEN SOURCE: NORMAL
SURFACE CRYSTALS, 120439: NORMAL
URATE MFR STONE: 100 %
WT STONE: 24 MG

## 2022-03-18 PROBLEM — L08.9 DIABETIC FOOT INFECTION (HCC): Status: ACTIVE | Noted: 2018-03-21

## 2022-03-18 PROBLEM — E11.628 DIABETIC FOOT INFECTION (HCC): Status: ACTIVE | Noted: 2018-03-21

## 2022-03-19 PROBLEM — L03.90 CELLULITIS: Status: ACTIVE | Noted: 2018-03-22

## 2022-03-19 PROBLEM — N20.1 CALCULUS OF URETER: Status: ACTIVE | Noted: 2021-07-20

## 2022-03-20 PROBLEM — M86.9 OSTEOMYELITIS OF RIGHT FOOT (HCC): Status: ACTIVE | Noted: 2018-03-23

## 2022-10-20 NOTE — PROGRESS NOTES
Hospitalist Progress Note    Patient: Carolina Rowe MRN: [id number]  CSN: [id number]    YOB: 1945  Age: 71 y.o. Sex: male    DOA: 3/21/2018 LOS:  LOS: 11 days          Chief Complaint: dm foot infection          Assessment/Plan     Disposition :  Patient Active Problem List   Diagnosis Code    Diabetic foot infection ([facility] [address] E11.628, L08.9    HTN (hypertension) I10    Diabetic neuropathy ([facility] [address] E11.40    Diabetic foot ulcer ([facility] [address] E11.621, L97.509    Diabetes ([facility] [address] E11.9    CAD (coronary artery disease) I25.10    Cellulitis L03.90    Osteomyelitis of right foot ([facility] [address] M86.9        -DM foot infection / osteomyelitis. -Chest pain with abnml CE yesterday. -RLE swelling. Duplex pending. -DM with neuropathy.  -HTN. -Hx CAD. Stents x 2 2012.  -Peripheral Vascular disease.  -Hyperlipidema. -Morbidly obese.       Check duplex. R/O DVT. Plan is for cardiac stress testing. Abx continue. Wound care. Dispo - SNF if stress test is ok,. Subjective:    Feeling well. Awaiting stress test tomorrow. Review of systems:    Constitutional: denies fevers, chills, myalgias  Respiratory: denies SOB, cough  Cardiovascular: denies chest pain, palpitations  Gastrointestinal: denies nausea, vomiting, diarrhea      Vital signs/Intake and Output:  Visit Vitals    /62    Pulse 77    Temp 98.5 °F (36.9 °C)    Resp 18    Ht 6' 1\" (1.854 m)    Wt 123.9 kg (273 lb 2.4 oz)    SpO2 98%    BMI 36.04 kg/m2     Current Shift:  04/01 0701 - 04/01 1900  In: 740 [P.O.:740]  Out: -   Last three shifts:  03/30 1901 - 04/01 0700  In: 1180 [P.O.:1180]  Out: 2900 [Urine:2900]    Exam:    General: Well developed, alert, NAD, OX3  Head/Neck: NCAT, supple, No masses, No lymphadenopathy  CVS:Regular rate and rhythm, no M/R/G, S1/S2 heard, no thrill  Lungs:Clear to auscultation bilaterally, no wheezes, rhonchi, or rales  Abdomen: Soft, bs+  Extremities: Improved RLE edema.                 Labs: Results: Chemistry Recent Labs      04/01/18   0730  03/31/18   0241   GLU  151*  146*   NA  142  148*   K  3.8  3.7   CL  109*  114*   CO2  29  29   BUN  9  10   CREA  1.02  0.79   CA  8.0*  8.0*   AGAP  4  5   BUCR  9*  13   AP   --   70   TP   --   6.0*   ALB   --   2.2*   GLOB   --   3.8   AGRAT   --   0.6*      CBC w/Diff Recent Labs      03/31/18   1230   WBC  5.4   RBC  3.90*   HGB  12.0*   HCT  36.7   PLT  173   GRANS  75*   LYMPH  16*   EOS  3      Cardiac Enzymes Recent Labs      03/31/18   0241  03/30/18   1900   CPK  20*  30*   CKND1  CALCULATION NOT PERFORMED WHEN RESULT IS BELOW LINEAR LIMIT  4.0      Coagulation Recent Labs      03/30/18   0310   PTP  13.9   INR  1.1       Lipid Panel No results found for: CHOL, CHOLPOCT, CHOLX, CHLST, CHOLV, 104461, HDL, LDL, LDLC, DLDLP, 598979, VLDLC, VLDL, TGLX, TRIGL, TRIGP, TGLPOCT, CHHD, CHHDX   BNP No results for input(s): BNPP in the last 72 hours.    Liver Enzymes Recent Labs      03/31/18   0241   TP  6.0*   ALB  2.2*   AP  70   SGOT  17      Thyroid Studies No results found for: T4, T3U, TSH, TSHEXT     Procedures/imaging: see electronic medical records for all procedures/Xrays and details which were not copied into this note but were reviewed prior to creation of Luis Bellamy MD Spironolactone Counseling: Patient advised regarding risks of diarrhea, abdominal pain, hyperkalemia, birth defects (for female patients), liver toxicity and renal toxicity. The patient may need blood work to monitor liver and kidney function and potassium levels while on therapy. The patient verbalized understanding of the proper use and possible adverse effects of spironolactone.  All of the patient's questions and concerns were addressed.

## 2023-06-27 ENCOUNTER — APPOINTMENT (OUTPATIENT)
Facility: HOSPITAL | Age: 78
End: 2023-06-27
Attending: EMERGENCY MEDICINE
Payer: MEDICARE

## 2023-06-27 ENCOUNTER — APPOINTMENT (OUTPATIENT)
Facility: HOSPITAL | Age: 78
End: 2023-06-27
Payer: MEDICARE

## 2023-06-27 ENCOUNTER — HOSPITAL ENCOUNTER (EMERGENCY)
Facility: HOSPITAL | Age: 78
Discharge: NURSING, REHAB OR LONG-TERM CARE | End: 2023-06-28
Attending: EMERGENCY MEDICINE
Payer: MEDICARE

## 2023-06-27 VITALS
DIASTOLIC BLOOD PRESSURE: 67 MMHG | WEIGHT: 250 LBS | OXYGEN SATURATION: 98 % | HEIGHT: 73 IN | TEMPERATURE: 97.9 F | RESPIRATION RATE: 22 BRPM | SYSTOLIC BLOOD PRESSURE: 161 MMHG | HEART RATE: 91 BPM | BODY MASS INDEX: 33.13 KG/M2

## 2023-06-27 DIAGNOSIS — R60.0 PEDAL EDEMA: Primary | ICD-10-CM

## 2023-06-27 DIAGNOSIS — N18.9 CHRONIC RENAL IMPAIRMENT, UNSPECIFIED CKD STAGE: ICD-10-CM

## 2023-06-27 LAB
ALBUMIN SERPL-MCNC: 3.6 G/DL (ref 3.4–5)
ALBUMIN/GLOB SERPL: 0.9 (ref 0.8–1.7)
ALP SERPL-CCNC: 77 U/L (ref 45–117)
ALT SERPL-CCNC: 23 U/L (ref 16–61)
ANION GAP SERPL CALC-SCNC: 3 MMOL/L (ref 3–18)
AST SERPL-CCNC: 19 U/L (ref 10–38)
BASOPHILS # BLD: 0.1 K/UL (ref 0–0.1)
BASOPHILS NFR BLD: 1 % (ref 0–2)
BILIRUB SERPL-MCNC: 0.3 MG/DL (ref 0.2–1)
BUN SERPL-MCNC: 44 MG/DL (ref 7–18)
BUN/CREAT SERPL: 23 (ref 12–20)
CALCIUM SERPL-MCNC: 8.9 MG/DL (ref 8.5–10.1)
CHLORIDE SERPL-SCNC: 103 MMOL/L (ref 100–111)
CO2 SERPL-SCNC: 33 MMOL/L (ref 21–32)
CREAT SERPL-MCNC: 1.9 MG/DL (ref 0.6–1.3)
DIFFERENTIAL METHOD BLD: ABNORMAL
ECHO BSA: 2.42 M2
EOSINOPHIL # BLD: 0.5 K/UL (ref 0–0.4)
EOSINOPHIL NFR BLD: 8 % (ref 0–5)
ERYTHROCYTE [DISTWIDTH] IN BLOOD BY AUTOMATED COUNT: 14 % (ref 11.6–14.5)
GLOBULIN SER CALC-MCNC: 3.9 G/DL (ref 2–4)
GLUCOSE SERPL-MCNC: 208 MG/DL (ref 74–99)
HCT VFR BLD AUTO: 32.3 % (ref 36–48)
HGB BLD-MCNC: 10.4 G/DL (ref 13–16)
IMM GRANULOCYTES # BLD AUTO: 0 K/UL (ref 0–0.04)
IMM GRANULOCYTES NFR BLD AUTO: 0 % (ref 0–0.5)
LYMPHOCYTES # BLD: 1.5 K/UL (ref 0.9–3.6)
LYMPHOCYTES NFR BLD: 24 % (ref 21–52)
MCH RBC QN AUTO: 32.8 PG (ref 24–34)
MCHC RBC AUTO-ENTMCNC: 32.2 G/DL (ref 31–37)
MCV RBC AUTO: 101.9 FL (ref 78–100)
MONOCYTES # BLD: 0.5 K/UL (ref 0.05–1.2)
MONOCYTES NFR BLD: 7 % (ref 3–10)
NEUTS SEG # BLD: 3.7 K/UL (ref 1.8–8)
NEUTS SEG NFR BLD: 59 % (ref 40–73)
NRBC # BLD: 0 K/UL (ref 0–0.01)
NRBC BLD-RTO: 0 PER 100 WBC
NT PRO BNP: 828 PG/ML (ref 0–1800)
PLATELET # BLD AUTO: 198 K/UL (ref 135–420)
PMV BLD AUTO: 10.4 FL (ref 9.2–11.8)
POTASSIUM SERPL-SCNC: 4.7 MMOL/L (ref 3.5–5.5)
PROT SERPL-MCNC: 7.5 G/DL (ref 6.4–8.2)
RBC # BLD AUTO: 3.17 M/UL (ref 4.35–5.65)
SODIUM SERPL-SCNC: 139 MMOL/L (ref 136–145)
TROPONIN I SERPL HS-MCNC: 13 NG/L (ref 0–78)
WBC # BLD AUTO: 6.3 K/UL (ref 4.6–13.2)

## 2023-06-27 PROCEDURE — 84484 ASSAY OF TROPONIN QUANT: CPT

## 2023-06-27 PROCEDURE — 71045 X-RAY EXAM CHEST 1 VIEW: CPT

## 2023-06-27 PROCEDURE — 83880 ASSAY OF NATRIURETIC PEPTIDE: CPT

## 2023-06-27 PROCEDURE — 80053 COMPREHEN METABOLIC PANEL: CPT

## 2023-06-27 PROCEDURE — 93970 EXTREMITY STUDY: CPT

## 2023-06-27 PROCEDURE — 99284 EMERGENCY DEPT VISIT MOD MDM: CPT

## 2023-06-27 PROCEDURE — 85025 COMPLETE CBC W/AUTO DIFF WBC: CPT

## (undated) DEVICE — BAG PRESSURE INFUSION 3 W STOPCOCK 3000 CC PREMIERPRO DISP

## (undated) DEVICE — SEAL ENDOSCP INSTR 7FR BX SELF SEAL

## (undated) DEVICE — CYSTO PACK: Brand: MEDLINE INDUSTRIES, INC.

## (undated) DEVICE — TRAY PREP DRY W/ PREM GLV 2 APPL 6 SPNG 2 UNDPD 1 OVERWRAP

## (undated) DEVICE — 3-0 COATED VICRYL PLUS UNDYED 1X27" SH --

## (undated) DEVICE — BANDAGE COMPR W4INXL5YD ELAS CLP CLSR

## (undated) DEVICE — DISPOSABLE TOURNIQUET CUFF SINGLE BLADDER, SINGLE PORT AND QUICK CONNECT CONNECTOR: Brand: COLOR CUFF

## (undated) DEVICE — DUAL LUMEN URETERAL CATHETER

## (undated) DEVICE — ABDOMINAL PAD: Brand: DERMACEA

## (undated) DEVICE — STRAP,POSITIONING,KNEE/BODY,FOAM,4X60": Brand: MEDLINE

## (undated) DEVICE — DRAPE XR C ARM 41X74IN LF --

## (undated) DEVICE — (D)SYR 10ML 1/5ML GRAD NSAF -- PKGING CHANGE USE ITEM 338027

## (undated) DEVICE — BLADE SAW W5.5XL18MM THK0.38MM CUT THK0.43MM REPL S STL SAG

## (undated) DEVICE — SWAB CULT LIQ STUART AGR AERB MOD IN BRK SGL RAYON TIP PLAS 220099] BECTON DICKINSON MICRO]

## (undated) DEVICE — SWAB CULT SGL AMIES W/O CHAR FOR THRT VAG SKIN HRT CULTSWAB

## (undated) DEVICE — STERILE POLYISOPRENE POWDER-FREE SURGICAL GLOVES: Brand: PROTEXIS

## (undated) DEVICE — GARMENT,MEDLINE,DVT,INT,CALF,MED, GEN2: Brand: MEDLINE

## (undated) DEVICE — SOLUTION IRRIG 3000ML 0.9% SOD CHL FLX CONT 0797208] ICU MEDICAL INC]

## (undated) DEVICE — PACK PROCEDURE SURG EXTREMITY CUST

## (undated) DEVICE — BANDAGE,GAUZE,BULKEE II,4.5"X4.1YD,STRL: Brand: MEDLINE

## (undated) DEVICE — TRAP MUCUS SPECIMEN 40ML -- MEDICHOICE

## (undated) DEVICE — NITINOL STONE RETRIEVAL BASKET: Brand: ZERO TIP

## (undated) DEVICE — DEVON™ KNEE AND BODY STRAP 60" X 3" (1.5 M X 7.6 CM): Brand: DEVON

## (undated) DEVICE — SPONGE GZ W4XL4IN COT 12 PLY TYP VII WVN C FLD DSGN

## (undated) DEVICE — DRAPE TWL SURG 16X26IN BLU ORB04] ALLCARE INC]

## (undated) DEVICE — NDL PRT INJ NSAF BLNT 18GX1.5 --

## (undated) DEVICE — CATHETER URET L70CM OD6FR OPN END FLEXIMA

## (undated) DEVICE — NEEDLE HYPO 25GA L1.5IN BVL ORIENTED ECLIPSE

## (undated) DEVICE — CATH IV AUTOGRD ORN 14GA 45MM -- INSYTE-N

## (undated) DEVICE — GDWIRE 3CM FLX-TIP 0.038X150CM -- BX/5 SENSOR

## (undated) DEVICE — BANDAGE COMPR EXSANGUATION SGL LAYERED NO CLSR 9FT LEN 4IN W

## (undated) DEVICE — SUTURE ETHLN SZ 2-0 L18IN NONABSORBABLE BLK L26MM FS 3/8 664G

## (undated) DEVICE — INTENDED FOR TISSUE SEPARATION, AND OTHER PROCEDURES THAT REQUIRE A SHARP SURGICAL BLADE TO PUNCTURE OR CUT.: Brand: BARD-PARKER SAFETY BLADES SIZE 15, STERILE

## (undated) DEVICE — GAUZE,PACKING STRIP,IODOFORM,1"X5YD,STRL: Brand: CURAD